# Patient Record
Sex: FEMALE | Race: OTHER | HISPANIC OR LATINO | URBAN - METROPOLITAN AREA
[De-identification: names, ages, dates, MRNs, and addresses within clinical notes are randomized per-mention and may not be internally consistent; named-entity substitution may affect disease eponyms.]

---

## 2019-05-24 ENCOUNTER — INPATIENT (INPATIENT)
Facility: HOSPITAL | Age: 69
LOS: 0 days | Discharge: HOME | DRG: 552 | End: 2019-05-25
Attending: STUDENT IN AN ORGANIZED HEALTH CARE EDUCATION/TRAINING PROGRAM | Admitting: STUDENT IN AN ORGANIZED HEALTH CARE EDUCATION/TRAINING PROGRAM
Payer: MEDICARE

## 2019-05-24 VITALS
WEIGHT: 140.21 LBS | TEMPERATURE: 98 F | OXYGEN SATURATION: 98 % | RESPIRATION RATE: 18 BRPM | SYSTOLIC BLOOD PRESSURE: 170 MMHG | HEART RATE: 79 BPM | DIASTOLIC BLOOD PRESSURE: 81 MMHG

## 2019-05-24 DIAGNOSIS — E78.00 PURE HYPERCHOLESTEROLEMIA, UNSPECIFIED: ICD-10-CM

## 2019-05-24 DIAGNOSIS — R63.8 OTHER SYMPTOMS AND SIGNS CONCERNING FOOD AND FLUID INTAKE: ICD-10-CM

## 2019-05-24 DIAGNOSIS — E11.9 TYPE 2 DIABETES MELLITUS WITHOUT COMPLICATIONS: ICD-10-CM

## 2019-05-24 DIAGNOSIS — Z91.89 OTHER SPECIFIED PERSONAL RISK FACTORS, NOT ELSEWHERE CLASSIFIED: ICD-10-CM

## 2019-05-24 DIAGNOSIS — I10 ESSENTIAL (PRIMARY) HYPERTENSION: ICD-10-CM

## 2019-05-24 DIAGNOSIS — J45.909 UNSPECIFIED ASTHMA, UNCOMPLICATED: ICD-10-CM

## 2019-05-24 DIAGNOSIS — Z29.9 ENCOUNTER FOR PROPHYLACTIC MEASURES, UNSPECIFIED: ICD-10-CM

## 2019-05-24 DIAGNOSIS — M54.9 DORSALGIA, UNSPECIFIED: ICD-10-CM

## 2019-05-24 DIAGNOSIS — Z98.890 OTHER SPECIFIED POSTPROCEDURAL STATES: Chronic | ICD-10-CM

## 2019-05-24 LAB
ALBUMIN SERPL ELPH-MCNC: 4.1 G/DL — SIGNIFICANT CHANGE UP (ref 3.3–5)
ALP SERPL-CCNC: 82 U/L — SIGNIFICANT CHANGE UP (ref 40–120)
ALT FLD-CCNC: 14 U/L — SIGNIFICANT CHANGE UP (ref 10–45)
ANION GAP SERPL CALC-SCNC: 8 MMOL/L — SIGNIFICANT CHANGE UP (ref 5–17)
APPEARANCE UR: CLEAR — SIGNIFICANT CHANGE UP
AST SERPL-CCNC: 16 U/L — SIGNIFICANT CHANGE UP (ref 10–40)
BASOPHILS # BLD AUTO: 0.06 K/UL — SIGNIFICANT CHANGE UP (ref 0–0.2)
BASOPHILS NFR BLD AUTO: 0.8 % — SIGNIFICANT CHANGE UP (ref 0–2)
BILIRUB SERPL-MCNC: 0.3 MG/DL — SIGNIFICANT CHANGE UP (ref 0.2–1.2)
BILIRUB UR-MCNC: NEGATIVE — SIGNIFICANT CHANGE UP
BUN SERPL-MCNC: 18 MG/DL — SIGNIFICANT CHANGE UP (ref 7–23)
CALCIUM SERPL-MCNC: 9.6 MG/DL — SIGNIFICANT CHANGE UP (ref 8.4–10.5)
CHLORIDE SERPL-SCNC: 105 MMOL/L — SIGNIFICANT CHANGE UP (ref 96–108)
CO2 SERPL-SCNC: 28 MMOL/L — SIGNIFICANT CHANGE UP (ref 22–31)
COLOR SPEC: YELLOW — SIGNIFICANT CHANGE UP
CREAT SERPL-MCNC: 0.73 MG/DL — SIGNIFICANT CHANGE UP (ref 0.5–1.3)
DIFF PNL FLD: NEGATIVE — SIGNIFICANT CHANGE UP
EOSINOPHIL # BLD AUTO: 0.31 K/UL — SIGNIFICANT CHANGE UP (ref 0–0.5)
EOSINOPHIL NFR BLD AUTO: 4.1 % — SIGNIFICANT CHANGE UP (ref 0–6)
GLUCOSE SERPL-MCNC: 148 MG/DL — HIGH (ref 70–99)
GLUCOSE UR QL: NEGATIVE — SIGNIFICANT CHANGE UP
HCT VFR BLD CALC: 36.9 % — SIGNIFICANT CHANGE UP (ref 34.5–45)
HGB BLD-MCNC: 12.5 G/DL — SIGNIFICANT CHANGE UP (ref 11.5–15.5)
IMM GRANULOCYTES NFR BLD AUTO: 0.3 % — SIGNIFICANT CHANGE UP (ref 0–1.5)
KETONES UR-MCNC: NEGATIVE — SIGNIFICANT CHANGE UP
LEUKOCYTE ESTERASE UR-ACNC: NEGATIVE — SIGNIFICANT CHANGE UP
LYMPHOCYTES # BLD AUTO: 2.97 K/UL — SIGNIFICANT CHANGE UP (ref 1–3.3)
LYMPHOCYTES # BLD AUTO: 39.5 % — SIGNIFICANT CHANGE UP (ref 13–44)
MCHC RBC-ENTMCNC: 31.2 PG — SIGNIFICANT CHANGE UP (ref 27–34)
MCHC RBC-ENTMCNC: 33.9 GM/DL — SIGNIFICANT CHANGE UP (ref 32–36)
MCV RBC AUTO: 92 FL — SIGNIFICANT CHANGE UP (ref 80–100)
MONOCYTES # BLD AUTO: 0.44 K/UL — SIGNIFICANT CHANGE UP (ref 0–0.9)
MONOCYTES NFR BLD AUTO: 5.9 % — SIGNIFICANT CHANGE UP (ref 2–14)
NEUTROPHILS # BLD AUTO: 3.71 K/UL — SIGNIFICANT CHANGE UP (ref 1.8–7.4)
NEUTROPHILS NFR BLD AUTO: 49.4 % — SIGNIFICANT CHANGE UP (ref 43–77)
NITRITE UR-MCNC: NEGATIVE — SIGNIFICANT CHANGE UP
NRBC # BLD: 0 /100 WBCS — SIGNIFICANT CHANGE UP (ref 0–0)
PH UR: 6 — SIGNIFICANT CHANGE UP (ref 5–8)
PLATELET # BLD AUTO: 191 K/UL — SIGNIFICANT CHANGE UP (ref 150–400)
POTASSIUM SERPL-MCNC: 4.5 MMOL/L — SIGNIFICANT CHANGE UP (ref 3.5–5.3)
POTASSIUM SERPL-SCNC: 4.5 MMOL/L — SIGNIFICANT CHANGE UP (ref 3.5–5.3)
PROT SERPL-MCNC: 6.7 G/DL — SIGNIFICANT CHANGE UP (ref 6–8.3)
PROT UR-MCNC: NEGATIVE MG/DL — SIGNIFICANT CHANGE UP
RBC # BLD: 4.01 M/UL — SIGNIFICANT CHANGE UP (ref 3.8–5.2)
RBC # FLD: 12.2 % — SIGNIFICANT CHANGE UP (ref 10.3–14.5)
SODIUM SERPL-SCNC: 141 MMOL/L — SIGNIFICANT CHANGE UP (ref 135–145)
SP GR SPEC: 1.01 — SIGNIFICANT CHANGE UP (ref 1–1.03)
UROBILINOGEN FLD QL: 0.2 E.U./DL — SIGNIFICANT CHANGE UP
WBC # BLD: 7.51 K/UL — SIGNIFICANT CHANGE UP (ref 3.8–10.5)
WBC # FLD AUTO: 7.51 K/UL — SIGNIFICANT CHANGE UP (ref 3.8–10.5)

## 2019-05-24 PROCEDURE — 93010 ELECTROCARDIOGRAM REPORT: CPT

## 2019-05-24 PROCEDURE — 73502 X-RAY EXAM HIP UNI 2-3 VIEWS: CPT | Mod: 26,RT

## 2019-05-24 PROCEDURE — 72100 X-RAY EXAM L-S SPINE 2/3 VWS: CPT | Mod: 26

## 2019-05-24 PROCEDURE — 99285 EMERGENCY DEPT VISIT HI MDM: CPT | Mod: 25

## 2019-05-24 PROCEDURE — 99223 1ST HOSP IP/OBS HIGH 75: CPT | Mod: GC

## 2019-05-24 RX ORDER — SODIUM CHLORIDE 9 MG/ML
1000 INJECTION, SOLUTION INTRAVENOUS
Refills: 0 | Status: DISCONTINUED | OUTPATIENT
Start: 2019-05-24 | End: 2019-05-25

## 2019-05-24 RX ORDER — MORPHINE SULFATE 50 MG/1
2 CAPSULE, EXTENDED RELEASE ORAL ONCE
Refills: 0 | Status: DISCONTINUED | OUTPATIENT
Start: 2019-05-24 | End: 2019-05-24

## 2019-05-24 RX ORDER — IBUPROFEN 200 MG
400 TABLET ORAL ONCE
Refills: 0 | Status: COMPLETED | OUTPATIENT
Start: 2019-05-24 | End: 2019-05-24

## 2019-05-24 RX ORDER — INSULIN LISPRO 100/ML
VIAL (ML) SUBCUTANEOUS
Refills: 0 | Status: DISCONTINUED | OUTPATIENT
Start: 2019-05-24 | End: 2019-05-25

## 2019-05-24 RX ORDER — ACETAMINOPHEN 500 MG
650 TABLET ORAL EVERY 6 HOURS
Refills: 0 | Status: DISCONTINUED | OUTPATIENT
Start: 2019-05-24 | End: 2019-05-25

## 2019-05-24 RX ORDER — UMECLIDINIUM 62.5 UG/1
0 AEROSOL, POWDER ORAL
Qty: 0 | Refills: 0 | DISCHARGE

## 2019-05-24 RX ORDER — INSULIN GLARGINE 100 [IU]/ML
12 INJECTION, SOLUTION SUBCUTANEOUS AT BEDTIME
Refills: 0 | Status: DISCONTINUED | OUTPATIENT
Start: 2019-05-24 | End: 2019-05-25

## 2019-05-24 RX ORDER — GLUCAGON INJECTION, SOLUTION 0.5 MG/.1ML
1 INJECTION, SOLUTION SUBCUTANEOUS ONCE
Refills: 0 | Status: DISCONTINUED | OUTPATIENT
Start: 2019-05-24 | End: 2019-05-25

## 2019-05-24 RX ORDER — IBUPROFEN 200 MG
400 TABLET ORAL EVERY 6 HOURS
Refills: 0 | Status: DISCONTINUED | OUTPATIENT
Start: 2019-05-24 | End: 2019-05-25

## 2019-05-24 RX ORDER — DIAZEPAM 5 MG
5 TABLET ORAL ONCE
Refills: 0 | Status: DISCONTINUED | OUTPATIENT
Start: 2019-05-24 | End: 2019-05-24

## 2019-05-24 RX ORDER — SODIUM CHLORIDE 9 MG/ML
3 INJECTION INTRAMUSCULAR; INTRAVENOUS; SUBCUTANEOUS ONCE
Refills: 0 | Status: COMPLETED | OUTPATIENT
Start: 2019-05-24 | End: 2019-05-24

## 2019-05-24 RX ORDER — ACETAMINOPHEN 500 MG
650 TABLET ORAL EVERY 6 HOURS
Refills: 0 | Status: DISCONTINUED | OUTPATIENT
Start: 2019-05-24 | End: 2019-05-24

## 2019-05-24 RX ORDER — RANITIDINE HYDROCHLORIDE 150 MG/1
300 TABLET, FILM COATED ORAL AT BEDTIME
Refills: 0 | Status: DISCONTINUED | OUTPATIENT
Start: 2019-05-24 | End: 2019-05-25

## 2019-05-24 RX ORDER — DEXTROSE 50 % IN WATER 50 %
25 SYRINGE (ML) INTRAVENOUS ONCE
Refills: 0 | Status: DISCONTINUED | OUTPATIENT
Start: 2019-05-24 | End: 2019-05-25

## 2019-05-24 RX ORDER — LISINOPRIL 2.5 MG/1
20 TABLET ORAL DAILY
Refills: 0 | Status: DISCONTINUED | OUTPATIENT
Start: 2019-05-24 | End: 2019-05-25

## 2019-05-24 RX ORDER — DEXTROSE 50 % IN WATER 50 %
15 SYRINGE (ML) INTRAVENOUS ONCE
Refills: 0 | Status: DISCONTINUED | OUTPATIENT
Start: 2019-05-24 | End: 2019-05-25

## 2019-05-24 RX ORDER — HEPARIN SODIUM 5000 [USP'U]/ML
5000 INJECTION INTRAVENOUS; SUBCUTANEOUS EVERY 8 HOURS
Refills: 0 | Status: DISCONTINUED | OUTPATIENT
Start: 2019-05-24 | End: 2019-05-25

## 2019-05-24 RX ORDER — LISINOPRIL 2.5 MG/1
0 TABLET ORAL
Qty: 0 | Refills: 0 | DISCHARGE

## 2019-05-24 RX ORDER — ATORVASTATIN CALCIUM 80 MG/1
30 TABLET, FILM COATED ORAL AT BEDTIME
Refills: 0 | Status: DISCONTINUED | OUTPATIENT
Start: 2019-05-24 | End: 2019-05-25

## 2019-05-24 RX ORDER — DEXTROSE 50 % IN WATER 50 %
12.5 SYRINGE (ML) INTRAVENOUS ONCE
Refills: 0 | Status: DISCONTINUED | OUTPATIENT
Start: 2019-05-24 | End: 2019-05-25

## 2019-05-24 RX ORDER — BUDESONIDE AND FORMOTEROL FUMARATE DIHYDRATE 160; 4.5 UG/1; UG/1
2 AEROSOL RESPIRATORY (INHALATION)
Refills: 0 | Status: DISCONTINUED | OUTPATIENT
Start: 2019-05-24 | End: 2019-05-25

## 2019-05-24 RX ORDER — RANITIDINE HYDROCHLORIDE 150 MG/1
0 TABLET, FILM COATED ORAL
Qty: 0 | Refills: 0 | DISCHARGE

## 2019-05-24 RX ORDER — IPRATROPIUM/ALBUTEROL SULFATE 18-103MCG
3 AEROSOL WITH ADAPTER (GRAM) INHALATION EVERY 6 HOURS
Refills: 0 | Status: DISCONTINUED | OUTPATIENT
Start: 2019-05-24 | End: 2019-05-25

## 2019-05-24 RX ORDER — ACETAMINOPHEN 500 MG
650 TABLET ORAL ONCE
Refills: 0 | Status: COMPLETED | OUTPATIENT
Start: 2019-05-24 | End: 2019-05-24

## 2019-05-24 RX ORDER — ATORVASTATIN CALCIUM 80 MG/1
0 TABLET, FILM COATED ORAL
Qty: 0 | Refills: 0 | DISCHARGE

## 2019-05-24 RX ORDER — INSULIN GLARGINE 100 [IU]/ML
0 INJECTION, SOLUTION SUBCUTANEOUS
Qty: 0 | Refills: 0 | DISCHARGE

## 2019-05-24 RX ADMIN — Medication 650 MILLIGRAM(S): at 19:13

## 2019-05-24 RX ADMIN — SODIUM CHLORIDE 3 MILLILITER(S): 9 INJECTION INTRAMUSCULAR; INTRAVENOUS; SUBCUTANEOUS at 20:17

## 2019-05-24 RX ADMIN — Medication 5 MILLIGRAM(S): at 18:01

## 2019-05-24 RX ADMIN — Medication 400 MILLIGRAM(S): at 18:01

## 2019-05-24 RX ADMIN — MORPHINE SULFATE 2 MILLIGRAM(S): 50 CAPSULE, EXTENDED RELEASE ORAL at 20:17

## 2019-05-24 RX ADMIN — Medication 400 MILLIGRAM(S): at 19:19

## 2019-05-24 NOTE — ED PROVIDER NOTE - NS ED ATTENDING STATEMENT MOD
Left message for pt to call back. Pt asked to confirm appt and status of medical records. Attending Only

## 2019-05-24 NOTE — ED ADULT TRIAGE NOTE - CHIEF COMPLAINT QUOTE
" I have right sided hip pain for a week" " I have right sided hip pain for a week going to my ankle."

## 2019-05-24 NOTE — H&P ADULT - PROBLEM SELECTOR PLAN 8
1) PCP Contacted on Admission: (Y/N) --> Name & Phone #: Does not remember  2) Date of Contact with PCP:  3) PCP Contacted at Discharge: (Y/N)  4) Summary of Handoff Given to PCP:   5) Post-Discharge Appointment Date and Location:

## 2019-05-24 NOTE — ED ADULT NURSE NOTE - NSIMPLEMENTINTERV_GEN_ALL_ED
Implemented All Fall Risk Interventions:  [place] to call system. Call bell, personal items and telephone within reach. Instruct patient to call for assistance. Room bathroom lighting operational. Non-slip footwear when patient is off stretcher. Physically safe environment: no spills, clutter or unnecessary equipment. Stretcher in lowest position, wheels locked, appropriate side rails in place. Provide visual cue, wrist band, yellow gown, etc. Monitor gait and stability. Monitor for mental status changes and reorient to person, place, and time. Review medications for side effects contributing to fall risk. Reinforce activity limits and safety measures with patient and family.

## 2019-05-24 NOTE — H&P ADULT - NSHPSOCIALHISTORY_GEN_ALL_CORE
Lives at home alone. No HHA, no assistive devices  Current smoker (>40 yrs, 6 cigs/day), no alcohol or other drug use

## 2019-05-24 NOTE — H&P ADULT - PROBLEM SELECTOR PLAN 2
- MDSS Takes Basaglar (long acting insulin) 25U at bedtime  - MDSS  - A1c  - Lantus 12U as pt hospitalized, can reassess with sliding requirements tomorrow

## 2019-05-24 NOTE — H&P ADULT - ASSESSMENT
68F PMH asthma, DM, HLD, HTN, laminectomy (1990, 1991) in past presenting to [facility] ED with 1 week of R lower back pain radiating down RLE, likely 2/2 sciatica vs. lumbar disc herniation.

## 2019-05-24 NOTE — ED ADULT NURSE NOTE - CHPI ED NUR SYMPTOMS NEG
no fatigue/no bowel dysfunction/no constipation/no motor function loss/no tingling/no bladder dysfunction/no neck tenderness/no numbness

## 2019-05-24 NOTE — H&P ADULT - PROBLEM SELECTOR PLAN 1
- Xray showing osteoarthritis and disc disease  - Tylenol PRN for moderate pain  - PT consult in AM R lower back pain w/ radiation down R leg likely 2/2 sciatica vs. disc herniation.  - Rectal exam with rectal tone intact; no symptoms of urinary incontinence or retention. No symptoms of fecal incontinence.  - Xray showing osteoarthritis and disc disease.   - Tylenol PRN for mild pain  - Motrin PRN for mod pain  - morphine PRN for severe pain  - PT consult in AM R lower back pain w/ radiation down R leg likely 2/2 sciatica vs. disc herniation.  - Rectal exam with rectal tone intact; no symptoms of urinary incontinence or retention. No symptoms of fecal incontinence.  - Xray showing osteoarthritis and disc disease  - Tylenol PRN for mod pain  - Motrin PRN for severe pain  - if uncontrolled, plan to add morphine for breakthrough  - PT consult in AM

## 2019-05-24 NOTE — H&P ADULT - PROBLEM SELECTOR PLAN 7
1) PCP Contacted on Admission: (Y/N) --> Name & Phone #:  2) Date of Contact with PCP:  3) PCP Contacted at Discharge: (Y/N)  4) Summary of Handoff Given to PCP:   5) Post-Discharge Appointment Date and Location: [facility]    #smoking cessation  - Counseled on smoking cessation  - Pt does not want nicotine replacement therapy at this time    #dyspepsia  - c/w home ranitidine 200mg daily

## 2019-05-24 NOTE — H&P ADULT - HISTORY OF PRESENT ILLNESS
68F PMH asthma, DM, HLD, laminectomy in past presenting to [facility] ED with 1 week of R hip pain radiating down RLE. Denies trauma, fall, urinary incontinence or retention, fecal incontinence, foot drop, f/c, cp, sob, abdominal pain, n/v/d/c, dysuria, edema, rash.    ED VSS  ED Adm: Motrin 400mg, Tylenol 650, diazepam 5mg, morphine IVP 2mg x1  Xray lumbar spine: No acute fracture; multilevel degenerative OA and disc disease 68F PMH asthma, current smoker (6 cigs/day), DM, HLD, HTN, laminectomy (1990, 1991) in past presenting to [facility] ED with 1 week of R lower back pain radiating down RLE. States pain is 10/10 of lower back (near buttock) and radiates down R leg, cramping in nature. Worse with ambulation. Prior to this, had no issues with ambulating but now has to lean on wall to walk. Denies trauma, fall, urinary incontinence or retention, fecal incontinence, foot drop, f/c, cp, sob, abdominal pain, n/v/d/c, dysuria, edema, rash.    ED VSS  ED Adm: Motrin 400mg, Tylenol 650, diazepam 5mg, morphine IVP 2mg x1  Xray lumbar spine: No acute fracture; multilevel degenerative OA and disc disease

## 2019-05-24 NOTE — ED PROVIDER NOTE - OBJECTIVE STATEMENT
69 yo F with hx of mult prior lumbar surgeries with 1 week of right hip pain radiating down RLE-  no incontinence of urine or stool--or fever or chills - no flank pain or focal LBP-  no trauma or falls- no fevers or chills- 67 yo F with hx of mult prior lumbar surgeries with 1 week of right hip pain radiating down RLE-  no incontinence of urine or stool--or fever or chills - no flank pain or focal LBP-  no trauma or falls- no fevers or chills- no foot drop or focal weakness 67 yo F with hx of asthma DM elev cholesterol with mult prior lumbar surgeries with 1 week of right hip pain radiating down RLE-  no incontinence of urine or stool--or fever or chills - no flank pain or focal LBP-  no trauma or falls- no fevers or chills- no foot drop or focal weakness

## 2019-05-24 NOTE — ED PROVIDER NOTE - CLINICAL SUMMARY MEDICAL DECISION MAKING FREE TEXT BOX
67 yo F with asthma DM HTN with  prior lami with atraumatic low back pain x 1 week -no hx of trauma -patellar femoral reflexes decreased  pos right sciatic notch tenderness - no foot drop--fall risk - diff ambulating - rec admit PT pain management  mri ordered   no incont of urine or stool   no fevers

## 2019-05-24 NOTE — H&P ADULT - PROBLEM SELECTOR PLAN 5
F: PO intake  E: replete PRN  N: consistent carb diet Takes Brio Ellipta and Symbicort at home. Not in exacerbation. Well-controlled.  - PRN Duonebs  - c/w home Symbicort

## 2019-05-24 NOTE — H&P ADULT - NSHPPHYSICALEXAM_GEN_ALL_CORE
Constitutional: elderly female, pleasant, appears WDWN, in NAD  Head: NC/AT  Eyes: EOMI, anicteric sclera  Neck: supple  Respiratory: CTA B/L; no W/R/R, no retractions  Cardiac: +S1/S2; RRR; no M/R/G  Gastrointestinal: abdomen soft, NT/ND; no rebound or guarding; +BSx4  Back: spine midline, no bony tenderness or step-offs; R gluteal region TTP; reproducible pain with straight leg raise 30 degrees  Extremities: WWP, no clubbing or cyanosis; no peripheral edema  Musculoskeletal: NROM x4; no joint swelling, tenderness or erythema  Vascular: 2+ radial, DP/PT pulses B/L  Dermatologic: skin warm, dry and intact; no rashes, wounds, or scars  Lymphatic: no submandibular or cervical LAD  Neurologic: AAOx3; CNII-XII grossly intact; no focal deficits  Psychiatric: affect and characteristics of appearance, verbalizations, behaviors are appropriate  Rectal tone intact

## 2019-05-24 NOTE — H&P ADULT - NSHPLABSRESULTS_GEN_ALL_CORE
.  LABS:                         12.5   7.51  )-----------( 191      ( 24 May 2019 20:07 )             36.9             Urinalysis Basic - ( 24 May 2019 20:17 )    Color: Yellow / Appearance: Clear / S.015 / pH: x  Gluc: x / Ketone: NEGATIVE  / Bili: Negative / Urobili: 0.2 E.U./dL   Blood: x / Protein: NEGATIVE mg/dL / Nitrite: NEGATIVE   Leuk Esterase: NEGATIVE / RBC: x / WBC x   Sq Epi: x / Non Sq Epi: x / Bacteria: x                RADIOLOGY, EKG & ADDITIONAL TESTS: Reviewed.

## 2019-05-24 NOTE — ED ADULT NURSE NOTE - OBJECTIVE STATEMENT
Pt came to ED complaining of right hip pain radiating to right leg and ankle x7 days. Pt ambulates with limp due to pain. Pt denies fall or trauma injury.  Pt denies all other medical complaints at this time. Positive PMS x4 extremities.

## 2019-05-24 NOTE — H&P ADULT - ATTENDING COMMENTS
68 year old female patient with history of laminectomy came in for worsening lower back pain with radiation to RLE.  1. Severe Back Pain  no signs of cord compression  likely secondary to degenerative disc disease  currently requires morphine IV  PRN order for severe pain - will try to deescalate to NSAIDs  2. Degenerative Disk Disease  outpatient follow up with spine surgery and PMR  physical therapy consult for dispo assessment

## 2019-05-25 ENCOUNTER — TRANSCRIPTION ENCOUNTER (OUTPATIENT)
Age: 69
End: 2019-05-25

## 2019-05-25 VITALS
HEART RATE: 62 BPM | TEMPERATURE: 97 F | DIASTOLIC BLOOD PRESSURE: 77 MMHG | OXYGEN SATURATION: 97 % | RESPIRATION RATE: 16 BRPM | SYSTOLIC BLOOD PRESSURE: 131 MMHG

## 2019-05-25 LAB
ANION GAP SERPL CALC-SCNC: 10 MMOL/L — SIGNIFICANT CHANGE UP (ref 5–17)
BUN SERPL-MCNC: 15 MG/DL — SIGNIFICANT CHANGE UP (ref 7–23)
CALCIUM SERPL-MCNC: 9.4 MG/DL — SIGNIFICANT CHANGE UP (ref 8.4–10.5)
CHLORIDE SERPL-SCNC: 107 MMOL/L — SIGNIFICANT CHANGE UP (ref 96–108)
CO2 SERPL-SCNC: 26 MMOL/L — SIGNIFICANT CHANGE UP (ref 22–31)
CREAT SERPL-MCNC: 0.66 MG/DL — SIGNIFICANT CHANGE UP (ref 0.5–1.3)
GLUCOSE SERPL-MCNC: 106 MG/DL — HIGH (ref 70–99)
HCV AB S/CO SERPL IA: 0.3 S/CO — SIGNIFICANT CHANGE UP
HCV AB SERPL-IMP: SIGNIFICANT CHANGE UP
POTASSIUM SERPL-MCNC: 3.7 MMOL/L — SIGNIFICANT CHANGE UP (ref 3.5–5.3)
POTASSIUM SERPL-SCNC: 3.7 MMOL/L — SIGNIFICANT CHANGE UP (ref 3.5–5.3)
SODIUM SERPL-SCNC: 143 MMOL/L — SIGNIFICANT CHANGE UP (ref 135–145)

## 2019-05-25 PROCEDURE — 73502 X-RAY EXAM HIP UNI 2-3 VIEWS: CPT

## 2019-05-25 PROCEDURE — G0378: CPT

## 2019-05-25 PROCEDURE — 80048 BASIC METABOLIC PNL TOTAL CA: CPT

## 2019-05-25 PROCEDURE — 99239 HOSP IP/OBS DSCHRG MGMT >30: CPT | Mod: GC

## 2019-05-25 PROCEDURE — 83036 HEMOGLOBIN GLYCOSYLATED A1C: CPT

## 2019-05-25 PROCEDURE — 99285 EMERGENCY DEPT VISIT HI MDM: CPT | Mod: 25

## 2019-05-25 PROCEDURE — 36415 COLL VENOUS BLD VENIPUNCTURE: CPT

## 2019-05-25 PROCEDURE — 72100 X-RAY EXAM L-S SPINE 2/3 VWS: CPT

## 2019-05-25 PROCEDURE — 93005 ELECTROCARDIOGRAM TRACING: CPT

## 2019-05-25 PROCEDURE — 94640 AIRWAY INHALATION TREATMENT: CPT

## 2019-05-25 PROCEDURE — 85025 COMPLETE CBC W/AUTO DIFF WBC: CPT

## 2019-05-25 PROCEDURE — 87086 URINE CULTURE/COLONY COUNT: CPT

## 2019-05-25 PROCEDURE — 86803 HEPATITIS C AB TEST: CPT

## 2019-05-25 PROCEDURE — 81003 URINALYSIS AUTO W/O SCOPE: CPT

## 2019-05-25 PROCEDURE — 97161 PT EVAL LOW COMPLEX 20 MIN: CPT

## 2019-05-25 PROCEDURE — 96374 THER/PROPH/DIAG INJ IV PUSH: CPT

## 2019-05-25 PROCEDURE — 80053 COMPREHEN METABOLIC PANEL: CPT

## 2019-05-25 PROCEDURE — 82962 GLUCOSE BLOOD TEST: CPT

## 2019-05-25 RX ORDER — CYCLOBENZAPRINE HYDROCHLORIDE 10 MG/1
1 TABLET, FILM COATED ORAL
Qty: 90 | Refills: 0
Start: 2019-05-25 | End: 2019-06-23

## 2019-05-25 RX ORDER — LIDOCAINE 4 G/100G
1 CREAM TOPICAL
Qty: 1 | Refills: 0
Start: 2019-05-25 | End: 2019-06-07

## 2019-05-25 RX ORDER — CYCLOBENZAPRINE HYDROCHLORIDE 10 MG/1
5 TABLET, FILM COATED ORAL THREE TIMES A DAY
Refills: 0 | Status: DISCONTINUED | OUTPATIENT
Start: 2019-05-25 | End: 2019-05-25

## 2019-05-25 RX ORDER — POTASSIUM CHLORIDE 20 MEQ
40 PACKET (EA) ORAL ONCE
Refills: 0 | Status: COMPLETED | OUTPATIENT
Start: 2019-05-25 | End: 2019-05-25

## 2019-05-25 RX ORDER — MORPHINE SULFATE 50 MG/1
1 CAPSULE, EXTENDED RELEASE ORAL ONCE
Refills: 0 | Status: DISCONTINUED | OUTPATIENT
Start: 2019-05-25 | End: 2019-05-25

## 2019-05-25 RX ADMIN — INSULIN GLARGINE 12 UNIT(S): 100 INJECTION, SOLUTION SUBCUTANEOUS at 00:11

## 2019-05-25 RX ADMIN — LISINOPRIL 20 MILLIGRAM(S): 2.5 TABLET ORAL at 06:28

## 2019-05-25 RX ADMIN — HEPARIN SODIUM 5000 UNIT(S): 5000 INJECTION INTRAVENOUS; SUBCUTANEOUS at 06:28

## 2019-05-25 RX ADMIN — Medication 650 MILLIGRAM(S): at 06:49

## 2019-05-25 RX ADMIN — Medication 40 MILLIEQUIVALENT(S): at 12:58

## 2019-05-25 RX ADMIN — MORPHINE SULFATE 1 MILLIGRAM(S): 50 CAPSULE, EXTENDED RELEASE ORAL at 12:57

## 2019-05-25 RX ADMIN — MORPHINE SULFATE 1 MILLIGRAM(S): 50 CAPSULE, EXTENDED RELEASE ORAL at 13:14

## 2019-05-25 RX ADMIN — HEPARIN SODIUM 5000 UNIT(S): 5000 INJECTION INTRAVENOUS; SUBCUTANEOUS at 00:10

## 2019-05-25 RX ADMIN — Medication 400 MILLIGRAM(S): at 05:28

## 2019-05-25 RX ADMIN — BUDESONIDE AND FORMOTEROL FUMARATE DIHYDRATE 2 PUFF(S): 160; 4.5 AEROSOL RESPIRATORY (INHALATION) at 12:58

## 2019-05-25 RX ADMIN — ATORVASTATIN CALCIUM 30 MILLIGRAM(S): 80 TABLET, FILM COATED ORAL at 00:10

## 2019-05-25 RX ADMIN — HEPARIN SODIUM 5000 UNIT(S): 5000 INJECTION INTRAVENOUS; SUBCUTANEOUS at 16:13

## 2019-05-25 RX ADMIN — Medication 2: at 13:25

## 2019-05-25 RX ADMIN — Medication 650 MILLIGRAM(S): at 07:15

## 2019-05-25 RX ADMIN — RANITIDINE HYDROCHLORIDE 300 MILLIGRAM(S): 150 TABLET, FILM COATED ORAL at 00:10

## 2019-05-25 RX ADMIN — Medication 40 MILLIGRAM(S): at 13:02

## 2019-05-25 RX ADMIN — Medication 400 MILLIGRAM(S): at 06:15

## 2019-05-25 NOTE — PHYSICAL THERAPY INITIAL EVALUATION ADULT - GAIT DEVIATIONS NOTED, PT EVAL
decreased francisco javier/decreased weight-shifting ability/antalgic gait with decreased single limb stance tolerance RLE

## 2019-05-25 NOTE — DISCHARGE NOTE NURSING/CASE MANAGEMENT/SOCIAL WORK - NSDCFUADDAPPT_GEN_ALL_CORE_FT
Please follow up with your primary care doctor in 1-2 weeks for pain control optimization and further management of care.    [facility]   Address: [address], [place] [address]  Phone: [phone]

## 2019-05-25 NOTE — DISCHARGE NOTE PROVIDER - HOSPITAL COURSE
A 68F PMH asthma, current smoker (6 cigs/day), DM, HLD, HTN, laminectomy (1990, 1991) in past presenting to [facility] ED with 1 week of R lower back pain radiating down RLE. States pain is 10/10 of lower back (near buttock) and radiates down R leg, cramping in nature. Worse with ambulation. Prior to this, had no issues with ambulating but now has to lean on wall to walk. Denies trauma, fall, urinary incontinence or retention, fecal incontinence, foot drop, f/c, cp, sob, abdominal pain, n/v/d/c, dysuria, edema, rash. VSS. Received Motrin 400mg, Tylenol 650, diazepam 5mg, morphine IVP 2mg x1. Xray lumbar spine: No acute fracture; multilevel degenerative OA and disc disease. Likely sciatica. PT evaluated patient. Recommended         Patient hemodynamically stabel and ready for discharge to A 68F PMH asthma, current smoker (6 cigs/day), DM, HLD, HTN, laminectomy (1990, 1991) in past presenting to [facility] ED with 1 week of R lower back pain radiating down RLE. States pain is 10/10 of lower back (near buttock) and radiates down R leg, cramping in nature. Worse with ambulation. Prior to this, had no issues with ambulating but now has to lean on wall to walk. Denies trauma, fall, urinary incontinence or retention, fecal incontinence, foot drop, f/c, cp, sob, abdominal pain, n/v/d/c, dysuria, edema, rash. VSS. Received Motrin 400mg, Tylenol 650, diazepam 5mg, morphine IVP 2mg x1. Xray lumbar spine: No acute fracture; multilevel degenerative OA and disc disease. Likely sciatica. PT evaluated patient. Recommended outpatient PT. D/c on flexeril, Prednisone taper, Lidocaine topical jelly.         Patient hemodynamically stable and ready for discharge to home with follow up instructions. A 68F PMH asthma, current smoker (6 cigs/day), DM, HLD, HTN, laminectomy (1990, 1991) in past presenting to [facility] ED with 1 week of R lower back pain radiating down RLE. States pain is 10/10 of lower back (near buttock) and radiates down R leg, cramping in nature. Worse with ambulation. Prior to this, had no issues with ambulating but now has to lean on wall to walk. Denies trauma, fall, urinary incontinence or retention, fecal incontinence, foot drop, f/c, cp, sob, abdominal pain, n/v/d/c, dysuria, edema, rash. VSS. Received Motrin 400mg, Tylenol 650, diazepam 5mg, morphine IVP 2mg x1. Xray lumbar spine: No acute fracture; multilevel degenerative OA and disc disease. Likely sciatica. PT evaluated patient. Recommended outpatient PT. D/c on flexeril, Prednisone taper, Lidocaine topical jelly.         Patient hemodynamically stable and ready for discharge to home with follow up instructions.             ATTENDING ADDENDUM    discharge diagnosis:        acute lumbosacral radiculopathy    DM II    HTN    asthma    hx lumbar laminectomy        Patient seen and examined. Case discussed with housestaff.     I was physically present for the key portions of the evaluation and management (E/M) service provided.  I agree with the above discharge note, physical, and plan which I have reviewed and edited where appropriate.        s/    daughter and grandchildren at bedside    pt c/o no pain while lying in bed    pain starts at right buttock and radiates to lat-anterior thigh to lateral lower leg    she has NO leg pain or paresthesias on left leg    (+) paresthesias    (-) numbness    (-) weakness per se, but has (+) pain that limits her mobility    no bladder or bowel incontinence     no urinary retention    no groin numbness    no trauma                vital signs reviewed and stable        gen: well appearing    heent: op clear, anicteric sclera, neck supple    cor: s1 s2 nml, rrr    pulm: cta bl , no w/r/r    abd: (+) bs, soft , nt/nd    ext: no c/c/e    back: no midline vertebral tenderness, no cva tenderness    vasc: 2+ radial and Dorsalis pedis pulses bl    derm: no rashes    buttock: right buttock tender, no erythema/edema.     neuro: aao x 3, cn ii-xii intact, 5/5 str upper ext bilat and LLE. 4/5 str (limited due to pain per pt) LLE . patellar reflexes 2+ bilat., ankle reflex 2+ bilat, no ankle clonus, downgoing plantar reflex bilat,  sensation intact, no dysmetria        Time spent : 50min

## 2019-05-25 NOTE — DISCHARGE NOTE PROVIDER - NSDCFUADDAPPT_GEN_ALL_CORE_FT
Please follow up with your primary care doctor in 1-2 weeks for pain control optimization and further management of care. Please follow up with your primary care doctor in 1-2 weeks for pain control optimization and further management of care.    [facility]   Address: [address], [place] [address]  Phone: [phone]

## 2019-05-25 NOTE — PROVIDER CONTACT NOTE (OTHER) - ACTION/TREATMENT ORDERED:
Spoke with MD Milka. Stated okay to administer tylenol. Will administer tylenol and monitor pt closely.

## 2019-05-25 NOTE — DISCHARGE NOTE PROVIDER - NSDCCPCAREPLAN_GEN_ALL_CORE_FT
PRINCIPAL DISCHARGE DIAGNOSIS  Diagnosis: Sciatica, right side  Assessment and Plan of Treatment: You presented with Right lower back pain with radiation down Right leg.   Xray showing osteoarthritis and disc disease. You recieved Tylenol, Motrin and Morphien for pain control. Physical therapy recommended         SECONDARY DISCHARGE DIAGNOSES  Diagnosis: Diabetes mellitus  Assessment and Plan of Treatment: Continue Basaglar (long acting insulin) 25U at bedtime. Curent A1c 6.9.    Diagnosis: Asthma  Assessment and Plan of Treatment: Continue Brio Ellipta and Symbicort at home.    Diagnosis: Hypertension  Assessment and Plan of Treatment: Continue home lisinopril 20mg daily. PRINCIPAL DISCHARGE DIAGNOSIS  Diagnosis: Sciatica, right side  Assessment and Plan of Treatment: You presented with Right lower back pain with radiation down Right leg.   Xray showing osteoarthritis and disc disease. You received Tylenol, Motrin and Morphine for pain control. Physical therapy recommended outpatient physical therapy. Please take Prednisone 40mg (5/26), 30mg (5/27), 20mg (5/28), 10mg (5/29). Please take Flexeril 5mg three times a day as needed. Please apply lidocaine jelly to area as needed. Please follow up with your PCP within 1 week for further management of care.      SECONDARY DISCHARGE DIAGNOSES  Diagnosis: Diabetes mellitus  Assessment and Plan of Treatment: Continue Basaglar (long acting insulin) 25U at bedtime. Curent A1c 6.9.    Diagnosis: Asthma  Assessment and Plan of Treatment: Continue Brio Ellipta and Symbicort at home.    Diagnosis: Hypertension  Assessment and Plan of Treatment: Continue home lisinopril 20mg daily.

## 2019-05-25 NOTE — PHYSICAL THERAPY INITIAL EVALUATION ADULT - PERTINENT HX OF CURRENT PROBLEM, REHAB EVAL
Pt. is a 68 y.o female presenting R LBP/ buttock pain with RLE radiation resulting in difficulty with walking. Imaging of the spine - negative for vert Fx, compression.

## 2019-05-25 NOTE — DISCHARGE NOTE NURSING/CASE MANAGEMENT/SOCIAL WORK - NSDCDPATPORTLINK_GEN_ALL_CORE
You can access the [facility] Patient Portal, offered by [facility], by registering with the following website: [email or web address]

## 2019-05-26 LAB
CULTURE RESULTS: SIGNIFICANT CHANGE UP
SPECIMEN SOURCE: SIGNIFICANT CHANGE UP

## 2019-05-28 PROBLEM — I10 ESSENTIAL (PRIMARY) HYPERTENSION: Chronic | Status: ACTIVE | Noted: 2019-05-24

## 2019-05-28 PROBLEM — Z00.00 ENCOUNTER FOR PREVENTIVE HEALTH EXAMINATION: Status: ACTIVE | Noted: 2019-05-28

## 2019-05-28 PROBLEM — E78.00 PURE HYPERCHOLESTEROLEMIA, UNSPECIFIED: Chronic | Status: ACTIVE | Noted: 2019-05-24

## 2019-05-30 DIAGNOSIS — F17.210 NICOTINE DEPENDENCE, CIGARETTES, UNCOMPLICATED: ICD-10-CM

## 2019-05-30 DIAGNOSIS — M51.17 INTERVERTEBRAL DISC DISORDERS WITH RADICULOPATHY, LUMBOSACRAL REGION: ICD-10-CM

## 2019-05-30 DIAGNOSIS — E78.5 HYPERLIPIDEMIA, UNSPECIFIED: ICD-10-CM

## 2019-05-30 DIAGNOSIS — M54.5 LOW BACK PAIN: ICD-10-CM

## 2019-05-30 DIAGNOSIS — E11.9 TYPE 2 DIABETES MELLITUS WITHOUT COMPLICATIONS: ICD-10-CM

## 2019-05-30 DIAGNOSIS — R10.13 EPIGASTRIC PAIN: ICD-10-CM

## 2019-05-30 DIAGNOSIS — I10 ESSENTIAL (PRIMARY) HYPERTENSION: ICD-10-CM

## 2019-05-30 DIAGNOSIS — J45.909 UNSPECIFIED ASTHMA, UNCOMPLICATED: ICD-10-CM

## 2019-06-26 ENCOUNTER — APPOINTMENT (OUTPATIENT)
Dept: PULMONOLOGY | Facility: CLINIC | Age: 69
End: 2019-06-26

## 2020-01-05 ENCOUNTER — EMERGENCY (EMERGENCY)
Facility: HOSPITAL | Age: 70
LOS: 1 days | Discharge: HOME | End: 2020-01-05
Attending: EMERGENCY MEDICINE | Admitting: EMERGENCY MEDICINE
Payer: MEDICARE

## 2020-01-05 VITALS
TEMPERATURE: 98 F | OXYGEN SATURATION: 99 % | RESPIRATION RATE: 18 BRPM | DIASTOLIC BLOOD PRESSURE: 80 MMHG | HEART RATE: 66 BPM | SYSTOLIC BLOOD PRESSURE: 132 MMHG

## 2020-01-05 VITALS
RESPIRATION RATE: 18 BRPM | DIASTOLIC BLOOD PRESSURE: 83 MMHG | SYSTOLIC BLOOD PRESSURE: 177 MMHG | HEART RATE: 81 BPM | WEIGHT: 140.88 LBS | OXYGEN SATURATION: 97 % | TEMPERATURE: 98 F | HEIGHT: 60 IN

## 2020-01-05 DIAGNOSIS — Z98.890 OTHER SPECIFIED POSTPROCEDURAL STATES: Chronic | ICD-10-CM

## 2020-01-05 DIAGNOSIS — R42 DIZZINESS AND GIDDINESS: ICD-10-CM

## 2020-01-05 LAB
ALBUMIN SERPL ELPH-MCNC: 4.4 G/DL — SIGNIFICANT CHANGE UP (ref 3.3–5)
ALP SERPL-CCNC: 105 U/L — SIGNIFICANT CHANGE UP (ref 40–120)
ALT FLD-CCNC: 18 U/L — SIGNIFICANT CHANGE UP (ref 10–45)
ANION GAP SERPL CALC-SCNC: 10 MMOL/L — SIGNIFICANT CHANGE UP (ref 5–17)
APTT BLD: 34.9 SEC — SIGNIFICANT CHANGE UP (ref 27.5–36.3)
AST SERPL-CCNC: 25 U/L — SIGNIFICANT CHANGE UP (ref 10–40)
BASOPHILS # BLD AUTO: 0.05 K/UL — SIGNIFICANT CHANGE UP (ref 0–0.2)
BASOPHILS NFR BLD AUTO: 0.8 % — SIGNIFICANT CHANGE UP (ref 0–2)
BILIRUB SERPL-MCNC: 0.4 MG/DL — SIGNIFICANT CHANGE UP (ref 0.2–1.2)
BUN SERPL-MCNC: 14 MG/DL — SIGNIFICANT CHANGE UP (ref 7–23)
CALCIUM SERPL-MCNC: 9.9 MG/DL — SIGNIFICANT CHANGE UP (ref 8.4–10.5)
CHLORIDE SERPL-SCNC: 106 MMOL/L — SIGNIFICANT CHANGE UP (ref 96–108)
CO2 SERPL-SCNC: 26 MMOL/L — SIGNIFICANT CHANGE UP (ref 22–31)
CREAT SERPL-MCNC: 0.66 MG/DL — SIGNIFICANT CHANGE UP (ref 0.5–1.3)
EOSINOPHIL # BLD AUTO: 0.22 K/UL — SIGNIFICANT CHANGE UP (ref 0–0.5)
EOSINOPHIL NFR BLD AUTO: 3.5 % — SIGNIFICANT CHANGE UP (ref 0–6)
GLUCOSE SERPL-MCNC: 121 MG/DL — HIGH (ref 70–99)
HCT VFR BLD CALC: 37.8 % — SIGNIFICANT CHANGE UP (ref 34.5–45)
HGB BLD-MCNC: 11.9 G/DL — SIGNIFICANT CHANGE UP (ref 11.5–15.5)
IMM GRANULOCYTES NFR BLD AUTO: 0.3 % — SIGNIFICANT CHANGE UP (ref 0–1.5)
INR BLD: 0.91 — SIGNIFICANT CHANGE UP (ref 0.88–1.16)
LYMPHOCYTES # BLD AUTO: 1.59 K/UL — SIGNIFICANT CHANGE UP (ref 1–3.3)
LYMPHOCYTES # BLD AUTO: 25.5 % — SIGNIFICANT CHANGE UP (ref 13–44)
MCHC RBC-ENTMCNC: 28.6 PG — SIGNIFICANT CHANGE UP (ref 27–34)
MCHC RBC-ENTMCNC: 31.5 GM/DL — LOW (ref 32–36)
MCV RBC AUTO: 90.9 FL — SIGNIFICANT CHANGE UP (ref 80–100)
MONOCYTES # BLD AUTO: 0.48 K/UL — SIGNIFICANT CHANGE UP (ref 0–0.9)
MONOCYTES NFR BLD AUTO: 7.7 % — SIGNIFICANT CHANGE UP (ref 2–14)
NEUTROPHILS # BLD AUTO: 3.87 K/UL — SIGNIFICANT CHANGE UP (ref 1.8–7.4)
NEUTROPHILS NFR BLD AUTO: 62.2 % — SIGNIFICANT CHANGE UP (ref 43–77)
NRBC # BLD: 0 /100 WBCS — SIGNIFICANT CHANGE UP (ref 0–0)
PLATELET # BLD AUTO: 241 K/UL — SIGNIFICANT CHANGE UP (ref 150–400)
POTASSIUM SERPL-MCNC: 4.8 MMOL/L — SIGNIFICANT CHANGE UP (ref 3.5–5.3)
POTASSIUM SERPL-SCNC: 4.8 MMOL/L — SIGNIFICANT CHANGE UP (ref 3.5–5.3)
PROT SERPL-MCNC: 7.3 G/DL — SIGNIFICANT CHANGE UP (ref 6–8.3)
PROTHROM AB SERPL-ACNC: 10.3 SEC — SIGNIFICANT CHANGE UP (ref 10–12.9)
RBC # BLD: 4.16 M/UL — SIGNIFICANT CHANGE UP (ref 3.8–5.2)
RBC # FLD: 13.3 % — SIGNIFICANT CHANGE UP (ref 10.3–14.5)
SODIUM SERPL-SCNC: 142 MMOL/L — SIGNIFICANT CHANGE UP (ref 135–145)
WBC # BLD: 6.23 K/UL — SIGNIFICANT CHANGE UP (ref 3.8–10.5)
WBC # FLD AUTO: 6.23 K/UL — SIGNIFICANT CHANGE UP (ref 3.8–10.5)

## 2020-01-05 PROCEDURE — 85025 COMPLETE CBC W/AUTO DIFF WBC: CPT

## 2020-01-05 PROCEDURE — 82962 GLUCOSE BLOOD TEST: CPT

## 2020-01-05 PROCEDURE — 80053 COMPREHEN METABOLIC PANEL: CPT

## 2020-01-05 PROCEDURE — 99283 EMERGENCY DEPT VISIT LOW MDM: CPT | Mod: 25

## 2020-01-05 PROCEDURE — 85730 THROMBOPLASTIN TIME PARTIAL: CPT

## 2020-01-05 PROCEDURE — 96360 HYDRATION IV INFUSION INIT: CPT

## 2020-01-05 PROCEDURE — 93005 ELECTROCARDIOGRAM TRACING: CPT

## 2020-01-05 PROCEDURE — 36415 COLL VENOUS BLD VENIPUNCTURE: CPT

## 2020-01-05 PROCEDURE — 99284 EMERGENCY DEPT VISIT MOD MDM: CPT

## 2020-01-05 PROCEDURE — 85610 PROTHROMBIN TIME: CPT

## 2020-01-05 PROCEDURE — 93010 ELECTROCARDIOGRAM REPORT: CPT

## 2020-01-05 RX ORDER — SODIUM CHLORIDE 9 MG/ML
1000 INJECTION INTRAMUSCULAR; INTRAVENOUS; SUBCUTANEOUS ONCE
Refills: 0 | Status: COMPLETED | OUTPATIENT
Start: 2020-01-05 | End: 2020-01-05

## 2020-01-05 RX ORDER — MECLIZINE HCL 12.5 MG
25 TABLET ORAL ONCE
Refills: 0 | Status: COMPLETED | OUTPATIENT
Start: 2020-01-05 | End: 2020-01-05

## 2020-01-05 RX ORDER — MECLIZINE HCL 12.5 MG
1 TABLET ORAL
Qty: 20 | Refills: 0
Start: 2020-01-05

## 2020-01-05 RX ORDER — CIPROFLOXACIN AND DEXAMETHASONE 3; 1 MG/ML; MG/ML
4 SUSPENSION/ DROPS AURICULAR (OTIC) ONCE
Refills: 0 | Status: COMPLETED | OUTPATIENT
Start: 2020-01-05 | End: 2020-01-05

## 2020-01-05 RX ORDER — ACETAMINOPHEN 500 MG
1000 TABLET ORAL ONCE
Refills: 0 | Status: COMPLETED | OUTPATIENT
Start: 2020-01-05 | End: 2020-01-05

## 2020-01-05 RX ADMIN — Medication 1000 MILLIGRAM(S): at 11:57

## 2020-01-05 RX ADMIN — Medication 25 MILLIGRAM(S): at 12:08

## 2020-01-05 RX ADMIN — SODIUM CHLORIDE 1000 MILLILITER(S): 9 INJECTION INTRAMUSCULAR; INTRAVENOUS; SUBCUTANEOUS at 11:57

## 2020-01-05 RX ADMIN — CIPROFLOXACIN AND DEXAMETHASONE 4 DROP(S): 3; 1 SUSPENSION/ DROPS AURICULAR (OTIC) at 12:27

## 2020-01-05 RX ADMIN — SODIUM CHLORIDE 1000 MILLILITER(S): 9 INJECTION INTRAMUSCULAR; INTRAVENOUS; SUBCUTANEOUS at 12:57

## 2020-01-05 RX ADMIN — Medication 1000 MILLIGRAM(S): at 12:27

## 2020-01-05 NOTE — ED PROVIDER NOTE - OBJECTIVE STATEMENT
69F here w/ acute onset R ear pain, bleeding and vertiginous dizziness x 1 day. never had this before. no trouble speaking swallowing double vision . no recent illness, no uri symptoms. feels like it is more difficult to hear out of that ear;. no recent swimming.

## 2020-01-05 NOTE — ED PROVIDER NOTE - CARE PROVIDER_API CALL
Reina Tse)  Otolaryngology  [address] [place] [address]  Phone: [phone]  Fax: [phone]  Follow Up Time:     Ferdinand Garrett)  Otolaryngology  [address], [address]  [place] [address]  Phone: [phone]  Fax: [phone]  Follow Up Time:

## 2020-01-05 NOTE — ED PROVIDER NOTE - CARE PROVIDERS DIRECT ADDRESSES
[email or web address]

## 2020-01-05 NOTE — ED ADULT NURSE NOTE - CHIEF COMPLAINT QUOTE
pt c/o headache, dizziness, R ear bleeding since this morning. hx DM. fs 114. pt also reports neck stiffness. denies any fevers.

## 2020-01-05 NOTE — ED PROVIDER NOTE - PATIENT PORTAL LINK FT
You can access the FollowMyHealth Patient Portal offered by [facility] by registering at the following website: [email or web address]. By joining [employer]’s FollowMyHealth portal, you will also be able to view your health information using other applications (apps) compatible with our system.

## 2020-01-05 NOTE — ED PROVIDER NOTE - PHYSICAL EXAMINATION
CONSTITUTIONAL: Well-appearing; well-nourished; in no apparent distress.   HEAD: Normocephalic; atraumatic.   EYES:  conjunctiva and sclera clear  ENT: normal nose; no rhinorrhea; normal pharynx with no erythema or lesions. R ear canal with white crusting, cerumen, dried blood, unable to visualize TM  NECK: Supple; non-tender;   CARDIOVASCULAR: Normal S1, S2; no murmurs, rubs, or gallops. Regular rate and rhythm.   RESPIRATORY: Breathing easily; breath sounds clear and equal bilaterally; no wheezes, rhonchi, or rales.  GI: Soft; non-distended; non-tender; no palpable organomegaly.   EXT: No cyanosis or edema; N/V intact  SKIN: Normal for age and race; warm; dry; good turgor; no apparent lesions or rash.   NEURO: A & O x 3; face symmetric; grossly unremarkable.   PSYCHOLOGICAL: The patient’s mood and manner are appropriate.

## 2020-01-05 NOTE — ED PROVIDER NOTE - NSFOLLOWUPCLINICS_GEN_ALL_ED_FT
[facility]  Otolaryngology (ENT)  [address] [place] [place] [address] - [address]  [place], [place] [address]  Phone: [phone]  Fax:

## 2020-01-05 NOTE — ED PROVIDER NOTE - CLINICAL SUMMARY MEDICAL DECISION MAKING FREE TEXT BOX
here w/ otitis externa with mild bleeding, started on drops, and likely vertigo 2/2 this. plan for outpatient ent eval

## 2020-01-05 NOTE — ED PROVIDER NOTE - NSFOLLOWUPINSTRUCTIONS_ED_ALL_ED_FT
Please follow up with an ENT doctor as I could not well visualize the ear.       Vertigo  Vertigo means that you feel like you are moving when you are not. Vertigo can also make you feel like things around you are moving when they are not. This feeling can come and go at any time. Vertigo often goes away on its own.    Follow these instructions at home:  Avoid making fast movements.  Avoid driving.  Avoid using heavy machinery.  Avoid doing any task or activity that might cause danger to you or other people if you would have a vertigo attack while you are doing it.  Sit down right away if you feel dizzy or have trouble with your balance.  Take over-the-counter and prescription medicines only as told by your doctor.  Follow instructions from your doctor about which positions or movements you should avoid.  Drink enough fluid to keep your pee (urine) clear or pale yellow.  Keep all follow-up visits as told by your doctor. This is important.  Contact a doctor if:  Medicine does not help your vertigo.  You have a fever.  Your problems get worse or you have new symptoms.  Your family or friends see changes in your behavior.  You feel sick to your stomach (nauseous) or you throw up (vomit).  You have a “pins and needles” feeling or you are numb in part of your body.  Get help right away if:  You have trouble moving or talking.  You are always dizzy.  You pass out (faint).  You get very bad headaches.  You feel weak or have trouble using your hands, arms, or legs.  You have changes in your hearing.  You have changes in your seeing (vision).  You get a stiff neck.  Bright light starts to bother you.

## 2020-01-05 NOTE — ED ADULT TRIAGE NOTE - CHIEF COMPLAINT QUOTE
pt c/o headache, dizziness, R ear bleeding since this morning. hx DM. fs 114. pt c/o headache, dizziness, R ear bleeding since this morning. hx DM. fs 114. pt also reports neck stiffness. denies any fevers.

## 2020-01-05 NOTE — ED ADULT NURSE NOTE - OBJECTIVE STATEMENT
AOX4 +ambulatory patient reports sudden onset of right ear bleeding x this morning. Patient also complaining of headache and dizziness. Patient denies any chest pains shortness of breath weakness or trauma

## 2020-01-08 ENCOUNTER — APPOINTMENT (OUTPATIENT)
Dept: OTOLARYNGOLOGY | Facility: CLINIC | Age: 70
End: 2020-01-08
Payer: MEDICARE

## 2020-01-08 VITALS
SYSTOLIC BLOOD PRESSURE: 128 MMHG | DIASTOLIC BLOOD PRESSURE: 78 MMHG | TEMPERATURE: 97.8 F | HEART RATE: 72 BPM | HEIGHT: 60 IN | BODY MASS INDEX: 27.48 KG/M2 | WEIGHT: 140 LBS | OXYGEN SATURATION: 98 % | RESPIRATION RATE: 15 BRPM

## 2020-01-08 DIAGNOSIS — Z83.3 FAMILY HISTORY OF DIABETES MELLITUS: ICD-10-CM

## 2020-01-08 DIAGNOSIS — Z87.891 PERSONAL HISTORY OF NICOTINE DEPENDENCE: ICD-10-CM

## 2020-01-08 DIAGNOSIS — Z82.49 FAMILY HISTORY OF ISCHEMIC HEART DISEASE AND OTHER DISEASES OF THE CIRCULATORY SYSTEM: ICD-10-CM

## 2020-01-08 DIAGNOSIS — Z78.9 OTHER SPECIFIED HEALTH STATUS: ICD-10-CM

## 2020-01-08 DIAGNOSIS — Z82.2 FAMILY HISTORY OF DEAFNESS AND HEARING LOSS: ICD-10-CM

## 2020-01-08 PROCEDURE — 92504 EAR MICROSCOPY EXAMINATION: CPT

## 2020-01-08 PROCEDURE — 99203 OFFICE O/P NEW LOW 30 MIN: CPT | Mod: 25

## 2020-01-08 NOTE — REASON FOR VISIT
[Initial Evaluation] : an initial evaluation for [FreeTextEntry2] : Bleeding Rt ear and ext otitis and ceruman

## 2020-01-09 ENCOUNTER — APPOINTMENT (OUTPATIENT)
Dept: OTOLARYNGOLOGY | Facility: CLINIC | Age: 70
End: 2020-01-09

## 2020-01-21 ENCOUNTER — APPOINTMENT (OUTPATIENT)
Dept: OTOLARYNGOLOGY | Facility: CLINIC | Age: 70
End: 2020-01-21
Payer: MEDICARE

## 2020-01-21 PROCEDURE — 92550 TYMPANOMETRY & REFLEX THRESH: CPT

## 2020-01-21 PROCEDURE — 92557 COMPREHENSIVE HEARING TEST: CPT

## 2020-01-21 PROCEDURE — 99213 OFFICE O/P EST LOW 20 MIN: CPT

## 2020-01-21 NOTE — PHYSICAL EXAM
[Midline] : trachea located in midline position [Normal] : orientation to person, place, and time: normal [de-identified] : cleaned w microscope and suctioned [de-identified] : Bleeding Rt ear and ext otitis and cerumen

## 2020-01-21 NOTE — HISTORY OF PRESENT ILLNESS
[de-identified] : 69 years old female patient with history of Otitis externa in the right ear.  Patient is present today in the office with Right side Otitis externa.

## 2020-01-21 NOTE — REASON FOR VISIT
[Subsequent Evaluation] : a subsequent evaluation for [Spouse] : spouse [FreeTextEntry2] : Otitis externa in the right ear

## 2021-02-17 ENCOUNTER — EMERGENCY (EMERGENCY)
Facility: HOSPITAL | Age: 71
LOS: 1 days | Discharge: HOME | End: 2021-02-17
Attending: EMERGENCY MEDICINE | Admitting: EMERGENCY MEDICINE
Payer: MEDICARE

## 2021-02-17 VITALS
SYSTOLIC BLOOD PRESSURE: 183 MMHG | RESPIRATION RATE: 18 BRPM | OXYGEN SATURATION: 98 % | TEMPERATURE: 98 F | WEIGHT: 139.99 LBS | DIASTOLIC BLOOD PRESSURE: 97 MMHG | HEIGHT: 60 IN | HEART RATE: 90 BPM

## 2021-02-17 VITALS
DIASTOLIC BLOOD PRESSURE: 77 MMHG | TEMPERATURE: 98 F | OXYGEN SATURATION: 97 % | RESPIRATION RATE: 18 BRPM | SYSTOLIC BLOOD PRESSURE: 145 MMHG | HEART RATE: 64 BPM

## 2021-02-17 DIAGNOSIS — Z79.899 OTHER LONG TERM (CURRENT) DRUG THERAPY: ICD-10-CM

## 2021-02-17 DIAGNOSIS — Z79.4 LONG TERM (CURRENT) USE OF INSULIN: ICD-10-CM

## 2021-02-17 DIAGNOSIS — Z20.822 CONTACT WITH AND (SUSPECTED) EXPOSURE TO COVID-19: ICD-10-CM

## 2021-02-17 DIAGNOSIS — M79.674 PAIN IN RIGHT TOE(S): ICD-10-CM

## 2021-02-17 DIAGNOSIS — I10 ESSENTIAL (PRIMARY) HYPERTENSION: ICD-10-CM

## 2021-02-17 DIAGNOSIS — L03.032 CELLULITIS OF LEFT TOE: ICD-10-CM

## 2021-02-17 DIAGNOSIS — E11.9 TYPE 2 DIABETES MELLITUS WITHOUT COMPLICATIONS: ICD-10-CM

## 2021-02-17 DIAGNOSIS — Z98.890 OTHER SPECIFIED POSTPROCEDURAL STATES: Chronic | ICD-10-CM

## 2021-02-17 LAB
ALBUMIN SERPL ELPH-MCNC: 4.3 G/DL — SIGNIFICANT CHANGE UP (ref 3.3–5)
ALP SERPL-CCNC: 91 U/L — SIGNIFICANT CHANGE UP (ref 40–120)
ALT FLD-CCNC: 19 U/L — SIGNIFICANT CHANGE UP (ref 10–45)
ANION GAP SERPL CALC-SCNC: 12 MMOL/L — SIGNIFICANT CHANGE UP (ref 5–17)
APTT BLD: 36.8 SEC — HIGH (ref 27.5–35.5)
AST SERPL-CCNC: 22 U/L — SIGNIFICANT CHANGE UP (ref 10–40)
BASOPHILS # BLD AUTO: 0.05 K/UL — SIGNIFICANT CHANGE UP (ref 0–0.2)
BASOPHILS NFR BLD AUTO: 0.6 % — SIGNIFICANT CHANGE UP (ref 0–2)
BILIRUB SERPL-MCNC: 0.7 MG/DL — SIGNIFICANT CHANGE UP (ref 0.2–1.2)
BUN SERPL-MCNC: 16 MG/DL — SIGNIFICANT CHANGE UP (ref 7–23)
CALCIUM SERPL-MCNC: 9.7 MG/DL — SIGNIFICANT CHANGE UP (ref 8.4–10.5)
CHLORIDE SERPL-SCNC: 106 MMOL/L — SIGNIFICANT CHANGE UP (ref 96–108)
CO2 SERPL-SCNC: 26 MMOL/L — SIGNIFICANT CHANGE UP (ref 22–31)
CREAT SERPL-MCNC: 0.63 MG/DL — SIGNIFICANT CHANGE UP (ref 0.5–1.3)
CRP SERPL-MCNC: 0.07 MG/DL — SIGNIFICANT CHANGE UP (ref 0–0.4)
EOSINOPHIL # BLD AUTO: 0.09 K/UL — SIGNIFICANT CHANGE UP (ref 0–0.5)
EOSINOPHIL NFR BLD AUTO: 1 % — SIGNIFICANT CHANGE UP (ref 0–6)
ERYTHROCYTE [SEDIMENTATION RATE] IN BLOOD: 22 MM/HR — SIGNIFICANT CHANGE UP
GLUCOSE SERPL-MCNC: 124 MG/DL — HIGH (ref 70–99)
HCT VFR BLD CALC: 36.7 % — SIGNIFICANT CHANGE UP (ref 34.5–45)
HGB BLD-MCNC: 12.3 G/DL — SIGNIFICANT CHANGE UP (ref 11.5–15.5)
IMM GRANULOCYTES NFR BLD AUTO: 0.3 % — SIGNIFICANT CHANGE UP (ref 0–1.5)
INR BLD: 0.96 — SIGNIFICANT CHANGE UP (ref 0.88–1.16)
LACTATE SERPL-SCNC: 1 MMOL/L — SIGNIFICANT CHANGE UP (ref 0.5–2)
LYMPHOCYTES # BLD AUTO: 1.49 K/UL — SIGNIFICANT CHANGE UP (ref 1–3.3)
LYMPHOCYTES # BLD AUTO: 17.2 % — SIGNIFICANT CHANGE UP (ref 13–44)
MCHC RBC-ENTMCNC: 30.5 PG — SIGNIFICANT CHANGE UP (ref 27–34)
MCHC RBC-ENTMCNC: 33.5 GM/DL — SIGNIFICANT CHANGE UP (ref 32–36)
MCV RBC AUTO: 91.1 FL — SIGNIFICANT CHANGE UP (ref 80–100)
MONOCYTES # BLD AUTO: 0.39 K/UL — SIGNIFICANT CHANGE UP (ref 0–0.9)
MONOCYTES NFR BLD AUTO: 4.5 % — SIGNIFICANT CHANGE UP (ref 2–14)
NEUTROPHILS # BLD AUTO: 6.59 K/UL — SIGNIFICANT CHANGE UP (ref 1.8–7.4)
NEUTROPHILS NFR BLD AUTO: 76.4 % — SIGNIFICANT CHANGE UP (ref 43–77)
NRBC # BLD: 0 /100 WBCS — SIGNIFICANT CHANGE UP (ref 0–0)
PLATELET # BLD AUTO: 213 K/UL — SIGNIFICANT CHANGE UP (ref 150–400)
POTASSIUM SERPL-MCNC: 3.4 MMOL/L — LOW (ref 3.5–5.3)
POTASSIUM SERPL-SCNC: 3.4 MMOL/L — LOW (ref 3.5–5.3)
PROT SERPL-MCNC: 7.1 G/DL — SIGNIFICANT CHANGE UP (ref 6–8.3)
PROTHROM AB SERPL-ACNC: 11.5 SEC — SIGNIFICANT CHANGE UP (ref 10.6–13.6)
RBC # BLD: 4.03 M/UL — SIGNIFICANT CHANGE UP (ref 3.8–5.2)
RBC # FLD: 12.8 % — SIGNIFICANT CHANGE UP (ref 10.3–14.5)
SARS-COV-2 RNA SPEC QL NAA+PROBE: SIGNIFICANT CHANGE UP
SODIUM SERPL-SCNC: 144 MMOL/L — SIGNIFICANT CHANGE UP (ref 135–145)
WBC # BLD: 8.64 K/UL — SIGNIFICANT CHANGE UP (ref 3.8–10.5)
WBC # FLD AUTO: 8.64 K/UL — SIGNIFICANT CHANGE UP (ref 3.8–10.5)

## 2021-02-17 PROCEDURE — 80053 COMPREHEN METABOLIC PANEL: CPT

## 2021-02-17 PROCEDURE — 85652 RBC SED RATE AUTOMATED: CPT

## 2021-02-17 PROCEDURE — U0003: CPT

## 2021-02-17 PROCEDURE — 36415 COLL VENOUS BLD VENIPUNCTURE: CPT

## 2021-02-17 PROCEDURE — 73630 X-RAY EXAM OF FOOT: CPT

## 2021-02-17 PROCEDURE — 86140 C-REACTIVE PROTEIN: CPT

## 2021-02-17 PROCEDURE — 85025 COMPLETE CBC W/AUTO DIFF WBC: CPT

## 2021-02-17 PROCEDURE — 87040 BLOOD CULTURE FOR BACTERIA: CPT

## 2021-02-17 PROCEDURE — 83605 ASSAY OF LACTIC ACID: CPT

## 2021-02-17 PROCEDURE — 73660 X-RAY EXAM OF TOE(S): CPT | Mod: 26,RT,59

## 2021-02-17 PROCEDURE — 99284 EMERGENCY DEPT VISIT MOD MDM: CPT

## 2021-02-17 PROCEDURE — 85610 PROTHROMBIN TIME: CPT

## 2021-02-17 PROCEDURE — U0005: CPT

## 2021-02-17 PROCEDURE — 73630 X-RAY EXAM OF FOOT: CPT | Mod: 26,RT

## 2021-02-17 PROCEDURE — 85730 THROMBOPLASTIN TIME PARTIAL: CPT

## 2021-02-17 PROCEDURE — 73660 X-RAY EXAM OF TOE(S): CPT

## 2021-02-17 RX ORDER — AZTREONAM 2 G
1 VIAL (EA) INJECTION
Qty: 14 | Refills: 0
Start: 2021-02-17 | End: 2021-02-23

## 2021-02-17 RX ADMIN — Medication 1 TABLET(S): at 14:47

## 2021-02-17 NOTE — ED PROVIDER NOTE - OBJECTIVE STATEMENT
70F, sent in by Dr. Pradhan (Podiatrist), presents after R great toe infection for  x2 weeks, s/p Cefadroxil treatment over the course of x1 week for toe infection. Pt f/u with podiatrist for the slow worsening of pain despite abx treatment. Pt denies any f/c, rigors or any other medical complaints at this time.  Pt admits to pain isolated in MCP and DIP states and that pain radiates to other toes upon movement. Pt denies any recent traumas or injuries. Pt denies any prior occurrence of current sxs.

## 2021-02-17 NOTE — ED PROVIDER NOTE - PATIENT PORTAL LINK FT
You can access the FollowMyHealth Patient Portal offered by [facility] by registering at the following website: [email or web address]. By joining [facility]’s FollowMyHealth portal, you will also be able to view your health information using other applications (apps) compatible with our system.

## 2021-02-17 NOTE — CONSULT NOTE ADULT - ASSESSMENT
71yo F with PMHx DM2 (reports last A1c of 7%), HTN, HLD, COPD presents to [facility] ED for right great toe pain, referred by podiatrist Dr. Placido Pradhan. Pt underwent ingrown nail removal during outpatient office visit on 2/9/21 and was prescribed Cefadroxil 500mg q12 for 1wk. Pt states that the right great toe has remained red, swollen and painful since the ingrown nail removal. Mild skin and soft tissue infection of right hallux:       P:    71yo F with PMHx DM2 (reports last A1c of 7%), HTN, HLD, COPD presents to [facility] ED for right great toe pain, referred by podiatrist Dr. Placido Pradhan. Pt underwent ingrown nail removal during outpatient office visit on 2/9/21 and was prescribed Cefadroxil 500mg q12 for 1wk. Pt states that the right great toe has remained red, swollen and painful since the ingrown nail removal. Mild skin and soft tissue infection of right hallux. WBC 8. Vitals WNL.       P:    71yo F with PMHx DM2 (reports last A1c of 7%), HTN, HLD, COPD presents to [facility] ED for right great toe pain, referred by podiatrist Dr. Placido Pradhan. Pt underwent ingrown nail removal during outpatient office visit on 2/9/21 and was prescribed Cefadroxil 500mg q12 for 1wk. Pt states that the right great toe has remained red, swollen and painful since the ingrown nail removal. Mild skin and soft tissue infection of right hallux. WBC 8. Vitals WNL. R foot radiographs unremarkable, low suspicion of osteomyelitis at this time.       P:   Start Bactrim DS 1 tablet q12hr for 1 week   Follow-up with podiatrist Dr. Pradhan in 1 week to evaluate appearance/symptoms of right hallux.     Alternative provider for follow-up.   Patient should follow up with Dr. Edilberto Myrick within 1 week following ED visit.     Office information:          [place] Address- [address], [place] [address] Phone: [phone]         [place] Address- [address], [place] [address] Phone: [phone]   69yo F with PMHx DM2 (reports last A1c of 7%), HTN, HLD, COPD presents to [facility] ED for right great toe pain, referred by podiatrist Dr. Placido Pradhan. Pt underwent ingrown nail removal during outpatient office visit on 2/9/21 and was prescribed Cefadroxil 500mg q12 for 1wk. Pt states that the right great toe has remained red, swollen and painful since the ingrown nail removal. Mild skin and soft tissue infection of right hallux. WBC 8. Vitals WNL. R foot radiographs unremarkable, low suspicion of osteomyelitis at this time.       P:   Start Bactrim DS 1 tablet q12hr for 1 week   Follow-up with podiatrist Dr. Placido Pradhan in 1 week to evaluate appearance/symptoms of right hallux.     Alternative provider for follow-up.   Patient should follow up with Dr. Edilberto Myrick within 1 week following ED visit.     Office information:          [place] Address- [address] [place] [address] Phone: [phone]         [place] Address- [address], [place] [address] Phone: [phone]

## 2021-02-17 NOTE — ED PROVIDER NOTE - CLINICAL SUMMARY MEDICAL DECISION MAKING FREE TEXT BOX
70F present with right great toe infection and pain despite abx treatment    Plan: Will await podiatrist consult. Will order labs (including ESR, CRP)  to r/o osteomyelitis. Will order XRAY to r/o gas  or  osteomyelitis.

## 2021-02-17 NOTE — ED PROVIDER NOTE - PHYSICAL EXAMINATION
CONSTITUTIONAL: Well-appearing; well-nourished; in no apparent distress.   HEAD: Normocephalic; atraumatic.   EYES:  conjunctiva and sclera clear  ENT: normal nose; no rhinorrhea;  NECK: Supple; full ROM  RESPIRATORY: Breathing easily; no resp difficulty  EXT: Mild erythema of right MCP and right DIP; right great toe nail  is thick secondary to fugal changes, no breaks noted in the skin; no erythema to rest of toes.   DP pulse weak but palpable,   SKIN: Normal for age and race; warm; dry; good turgor; no apparent lesions or rash.   NEURO: A & O x 3; face symmetric; grossly unremarkable.   PSYCHOLOGICAL: The patient’s mood and manner are appropriate.

## 2021-02-17 NOTE — ED ADULT NURSE NOTE - OBJECTIVE STATEMENT
Patient alert and oriented x 3 came c/o right  big toe pain for 9 days now , Pt. went to podiatrist feb 2 for infected ingrown toe nail , presribed with antibiotic with relief . Pain still worse , went for ff up today was sent here for ivf abt for r/o osteomyelitis ,  . Denies any fever nor chills . History of DM . No numbness nor tingling sensation . Been taking tylenol with no relief .  Seen and examined by Dr. Jaimes , all labs sent .

## 2021-02-17 NOTE — CONSULT NOTE ADULT - SUBJECTIVE AND OBJECTIVE BOX
Attending:    Patient is a 70y old  Female who presents with a chief complaint of R hallux pain.     HPI:  71yo F with PMHx DM2 (reports last A1c of 7%), HTN, HLD, COPD, s/p back surgery (Nov 2020) presents to [facility] ED for right great toe pain, referred by podiatrist Dr. Placido Pradhan. Pt underwent ingrown nail removal during outpatient office visit on 2/9/21 and was prescribed Cefadroxil 500mg q12 for 1wk. Pt states that the right great toe has remained red, swollen and painful since the ingrown nail removal, never noticed significant improvement. Endorses compliance with oral course of abx. Occasionally feels a pins & needles sensation in her toes that started after her back surgery in 2020. Additionally, states that she has had some nausea and dizziness over the past few days which she had not felt prior to the ingrown nail removal. Denies fever, vomiting, chills, shortness of breath, chest pain.     Review of systems negative except per HPI    PAST MEDICAL & SURGICAL HISTORY:  Hypertension    High cholesterol    Diabetes    Asthma    H/O laminectomy  1990, 1991      Home Medications:  atorvastatin: 30 milligram(s) orally once a day (at bedtime) (05 Jan 2020 12:59)  Basaglar KwikPen 100 units/mL subcutaneous solution: 25 unit(s) subcutaneous once a day (at bedtime) (05 Jan 2020 12:59)  Incruse Ellipta 62.5 mcg/inh inhalation powder: 1 puff(s) inhaled once a day (05 Jan 2020 12:59)  lisinopril 20 mg oral tablet: 1 tab(s) orally once a day (05 Jan 2020 12:59)  raNITIdine 300 mg oral tablet: 1 tab(s) orally once a day (at bedtime) (05 Jan 2020 12:59)  Symbicort 160 mcg-4.5 mcg/inh inhalation aerosol: 2 puff(s) inhaled 2 times a day (05 Jan 2020 12:59)    Allergies    No Known Allergies    Intolerances      FAMILY HISTORY:  FH: diabetes mellitus  mother and father      Social History: Current smoker (1/2 PPD). Denies alcohol and recreational drug use. Lives at home with her .       LABS                        12.3   8.64  )-----------( 213      ( 17 Feb 2021 12:47 )             36.7     02-17    x   |  x   |  16  ----------------------------<  x   x    |  26  |  0.63    Ca    9.7      17 Feb 2021 12:47      PT/INR - ( 17 Feb 2021 12:47 )   PT: 11.5 sec;   INR: 0.96          PTT - ( 17 Feb 2021 12:47 )  PTT:36.8 sec    Vital Signs Last 24 Hrs  T(C): 36.8 (17 Feb 2021 12:09), Max: 36.8 (17 Feb 2021 12:09)  T(F): 98.2 (17 Feb 2021 12:09), Max: 98.2 (17 Feb 2021 12:09)  HR: 90 (17 Feb 2021 12:09) (90 - 90)  BP: 183/97 (17 Feb 2021 12:09) (183/97 - 183/97)  BP(mean): --  RR: 18 (17 Feb 2021 12:09) (18 - 18)  SpO2: 98% (17 Feb 2021 12:09) (98% - 98%)    PHYSICAL EXAM  General: NAD, AA0x3    Lower Extremity Focused:  Vasc: DP/PT 2/4 B/L. CFT <2 secs x 10. Increased warmth to right hallux as compared to the neighboring digits and left foot.   Derm: Mild erythema noted to right hallux. No drainage/pus. No malodor.   Neuro: Protective sensation grossly intact b/l.   MSK: 5/5 muscle strength in all crural compartments of both LEs. TTP of right hallux.     RADIOLOGY                       Attending:    Patient is a 70y old  Female who presents with a chief complaint of R hallux pain.     HPI:  69yo F with PMHx DM2 (reports last A1c of 7%), HTN, HLD, COPD, s/p back surgery (Nov 2020) presents to [facility] ED for right great toe pain, referred by podiatrist Dr. Pradhan. Pt underwent ingrown nail removal during outpatient office visit on 2/9/21 and was prescribed Cefadroxil 500mg q12 for 1wk. Pt states that the right great toe has remained red, swollen and painful since the ingrown nail removal, never noticed significant improvement. Endorses compliance with oral course of abx. Occasionally feels a pins & needles sensation in her toes that started after her back surgery in 2020. Additionally, states that she has had some nausea and dizziness over the past few days which she had not felt prior to the ingrown nail removal. Denies fever, vomiting, chills, shortness of breath, chest pain.     Review of systems negative except per HPI    PAST MEDICAL & SURGICAL HISTORY:  Hypertension    High cholesterol    Diabetes    Asthma    H/O laminectomy  1990, 1991      Home Medications:  atorvastatin: 30 milligram(s) orally once a day (at bedtime) (05 Jan 2020 12:59)  Basaglar KwikPen 100 units/mL subcutaneous solution: 25 unit(s) subcutaneous once a day (at bedtime) (05 Jan 2020 12:59)  Incruse Ellipta 62.5 mcg/inh inhalation powder: 1 puff(s) inhaled once a day (05 Jan 2020 12:59)  lisinopril 20 mg oral tablet: 1 tab(s) orally once a day (05 Jan 2020 12:59)  raNITIdine 300 mg oral tablet: 1 tab(s) orally once a day (at bedtime) (05 Jan 2020 12:59)  Symbicort 160 mcg-4.5 mcg/inh inhalation aerosol: 2 puff(s) inhaled 2 times a day (05 Jan 2020 12:59)    Allergies    No Known Allergies    Intolerances      FAMILY HISTORY:  FH: diabetes mellitus  mother and father      Social History: Current smoker (1/2 PPD). Denies alcohol and recreational drug use. Lives at home with her .       LABS                        12.3   8.64  )-----------( 213      ( 17 Feb 2021 12:47 )             36.7     02-17    x   |  x   |  16  ----------------------------<  x   x    |  26  |  0.63    Ca    9.7      17 Feb 2021 12:47      PT/INR - ( 17 Feb 2021 12:47 )   PT: 11.5 sec;   INR: 0.96          PTT - ( 17 Feb 2021 12:47 )  PTT:36.8 sec    Vital Signs Last 24 Hrs  T(C): 36.8 (17 Feb 2021 12:09), Max: 36.8 (17 Feb 2021 12:09)  T(F): 98.2 (17 Feb 2021 12:09), Max: 98.2 (17 Feb 2021 12:09)  HR: 90 (17 Feb 2021 12:09) (90 - 90)  BP: 183/97 (17 Feb 2021 12:09) (183/97 - 183/97)  BP(mean): --  RR: 18 (17 Feb 2021 12:09) (18 - 18)  SpO2: 98% (17 Feb 2021 12:09) (98% - 98%)    PHYSICAL EXAM  General: NAD, AA0x3    Lower Extremity Focused:  Vasc: DP/PT 2/4 B/L. CFT <2 secs x 10. Increased warmth to right hallux as compared to the neighboring digits and left foot.   Derm: Mild erythema noted to right hallux. No drainage/pus. No malodor.   Neuro: Protective sensation grossly intact b/l.   MSK: 5/5 muscle strength in all crural compartments of both LEs. TTP of right hallux.     RADIOLOGY  Right foot XR (Wet Read): no significant osseous changes, low suspicion of OM.                      Attending: Edilberto Myrick DPM    Patient is a 70y old  Female who presents with a chief complaint of R hallux pain.     HPI:  71yo F with PMHx DM2 (reports last A1c of 7%), HTN, HLD, COPD, s/p back surgery (Nov 2020) presents to [facility] ED for right great toe pain, referred by podiatrist Dr. Pradhan. Pt underwent ingrown nail removal (both nail borders) during outpatient office visit on 2/9/21 and was prescribed Cefadroxil 500mg q12 for 1wk. Pt states that the right great toe has remained red, swollen and painful since the ingrown nail removal, never noticed significant improvement with antibiotics. Endorses compliance with oral course of abx. Occasionally feels a pins & needles sensation in her toes that started after her back surgery in 2020. Additionally, states that she has had some nausea and dizziness over the past few days which she had not felt prior to the ingrown nail removal. Denies fever, vomiting, chills, shortness of breath, chest pain.     Review of systems negative except per HPI    PAST MEDICAL & SURGICAL HISTORY:  Hypertension    High cholesterol    Diabetes    Asthma    H/O laminectomy  1990, 1991      Home Medications:  atorvastatin: 30 milligram(s) orally once a day (at bedtime) (05 Jan 2020 12:59)  Basaglar KwikPen 100 units/mL subcutaneous solution: 25 unit(s) subcutaneous once a day (at bedtime) (05 Jan 2020 12:59)  Incruse Ellipta 62.5 mcg/inh inhalation powder: 1 puff(s) inhaled once a day (05 Jan 2020 12:59)  lisinopril 20 mg oral tablet: 1 tab(s) orally once a day (05 Jan 2020 12:59)  raNITIdine 300 mg oral tablet: 1 tab(s) orally once a day (at bedtime) (05 Jan 2020 12:59)  Symbicort 160 mcg-4.5 mcg/inh inhalation aerosol: 2 puff(s) inhaled 2 times a day (05 Jan 2020 12:59)    Allergies    No Known Allergies    Intolerances      FAMILY HISTORY:  FH: diabetes mellitus  mother and father      Social History: Current smoker (1/2 PPD). Denies alcohol and recreational drug use. Lives at home with her .       LABS                        12.3   8.64  )-----------( 213      ( 17 Feb 2021 12:47 )             36.7     02-17    x   |  x   |  16  ----------------------------<  x   x    |  26  |  0.63    Ca    9.7      17 Feb 2021 12:47      PT/INR - ( 17 Feb 2021 12:47 )   PT: 11.5 sec;   INR: 0.96          PTT - ( 17 Feb 2021 12:47 )  PTT:36.8 sec    Vital Signs Last 24 Hrs  T(C): 36.8 (17 Feb 2021 12:09), Max: 36.8 (17 Feb 2021 12:09)  T(F): 98.2 (17 Feb 2021 12:09), Max: 98.2 (17 Feb 2021 12:09)  HR: 90 (17 Feb 2021 12:09) (90 - 90)  BP: 183/97 (17 Feb 2021 12:09) (183/97 - 183/97)  BP(mean): --  RR: 18 (17 Feb 2021 12:09) (18 - 18)  SpO2: 98% (17 Feb 2021 12:09) (98% - 98%)    PHYSICAL EXAM  General: NAD, AA0x3    Lower Extremity Focused:  Vasc: DP/PT 2/4 B/L. CFT <2 secs x 10. Increased warmth to right hallux as compared to the neighboring digits and left foot.   Derm: Mild erythema noted to right hallux. No drainage/pus. No malodor.   Neuro: Protective sensation grossly intact b/l.   MSK: 5/5 muscle strength in all crural compartments of both LEs. TTP of right hallux.     RADIOLOGY  Right foot XR (Wet Read): no significant osseous changes, low suspicion of OM.                      Attending: Edilberto Myrick DPM    Patient is a 70y old  Female who presents with a chief complaint of R hallux pain.     HPI:  71yo F with PMHx DM2 (reports last A1c of 7%), HTN, HLD, COPD, s/p back surgery (Nov 2020) presents to [facility] ED for right great toe pain, referred by podiatrist Dr. Placido Pradhan. Pt underwent ingrown nail removal (both great toe nail borders) during outpatient office visit on 2/9/21 and was prescribed Cefadroxil 500mg q12 for 1wk. Pt states that the right great toe has remained red, swollen and painful since the ingrown nail removal, never noticed significant improvement with antibiotics (1 day of abx remaining). Endorses compliance with oral course of abx. Occasionally feels a pins & needles sensation in her toes that started after her back surgery in 2020. Additionally, states that she has had some nausea and dizziness over the past few days which she had not felt prior to the ingrown nail removal. Denies fever, vomiting, chills, shortness of breath, chest pain.     Review of systems negative except per HPI    PAST MEDICAL & SURGICAL HISTORY:  Hypertension    High cholesterol    Diabetes    Asthma    H/O laminectomy  1990, 1991      Home Medications:  atorvastatin: 30 milligram(s) orally once a day (at bedtime) (05 Jan 2020 12:59)  Basaglar KwikPen 100 units/mL subcutaneous solution: 25 unit(s) subcutaneous once a day (at bedtime) (05 Jan 2020 12:59)  Incruse Ellipta 62.5 mcg/inh inhalation powder: 1 puff(s) inhaled once a day (05 Jan 2020 12:59)  lisinopril 20 mg oral tablet: 1 tab(s) orally once a day (05 Jan 2020 12:59)  raNITIdine 300 mg oral tablet: 1 tab(s) orally once a day (at bedtime) (05 Jan 2020 12:59)  Symbicort 160 mcg-4.5 mcg/inh inhalation aerosol: 2 puff(s) inhaled 2 times a day (05 Jan 2020 12:59)    Allergies    No Known Allergies    Intolerances      FAMILY HISTORY:  FH: diabetes mellitus  mother and father      Social History: Current smoker (1/2 PPD). Denies alcohol and recreational drug use. Lives at home with her .       LABS                        12.3   8.64  )-----------( 213      ( 17 Feb 2021 12:47 )             36.7     02-17    x   |  x   |  16  ----------------------------<  x   x    |  26  |  0.63    Ca    9.7      17 Feb 2021 12:47      PT/INR - ( 17 Feb 2021 12:47 )   PT: 11.5 sec;   INR: 0.96          PTT - ( 17 Feb 2021 12:47 )  PTT:36.8 sec    Vital Signs Last 24 Hrs  T(C): 36.8 (17 Feb 2021 12:09), Max: 36.8 (17 Feb 2021 12:09)  T(F): 98.2 (17 Feb 2021 12:09), Max: 98.2 (17 Feb 2021 12:09)  HR: 90 (17 Feb 2021 12:09) (90 - 90)  BP: 183/97 (17 Feb 2021 12:09) (183/97 - 183/97)  BP(mean): --  RR: 18 (17 Feb 2021 12:09) (18 - 18)  SpO2: 98% (17 Feb 2021 12:09) (98% - 98%)    PHYSICAL EXAM  General: NAD, AA0x3    Lower Extremity Focused:  Vasc: DP/PT 2/4 B/L. CFT <2 secs x 10. Increased warmth to right hallux as compared to the neighboring digits and left foot.   Derm: Mild erythema noted to right hallux. No drainage/pus. No malodor.   Neuro: Protective sensation grossly intact b/l.   MSK: 5/5 muscle strength in all crural compartments of both LEs. TTP along dorsal surface of right hallux and to right great toe nail.     RADIOLOGY  Right foot XR (Wet Read): no significant osseous changes, low suspicion of OM.

## 2021-02-17 NOTE — ED PROVIDER NOTE - NSFOLLOWUPINSTRUCTIONS_ED_ALL_ED_FT
Your labs and xray today are not concerning for a bone infection. We are going to change your antibiotic today.   START BACTRIM today  STOP the old antibiotic  See your podiatrist Dr. Pradhan next week to follow up      Cellulitis    Cellulitis is a skin infection caused by bacteria. This condition occurs most often in the arms and lower legs but can occur anywhere over the body. Symptoms include redness, swelling, warm skin, tenderness, and chills/fever. If you were prescribed an antibiotic medicine, take it as told by your health care provider. Do not stop taking the antibiotic even if you start to feel better.    SEEK IMMEDIATE MEDICAL CARE IF YOU HAVE ANY OF THE FOLLOWING SYMPTOMS: worsening fever, red streaks coming from affected area, vomiting or diarrhea, or dizziness/lightheadedness.

## 2021-02-22 LAB
CULTURE RESULTS: SIGNIFICANT CHANGE UP
CULTURE RESULTS: SIGNIFICANT CHANGE UP
SPECIMEN SOURCE: SIGNIFICANT CHANGE UP
SPECIMEN SOURCE: SIGNIFICANT CHANGE UP

## 2021-03-31 ENCOUNTER — EMERGENCY (EMERGENCY)
Facility: HOSPITAL | Age: 71
LOS: 1 days | Discharge: HOME | End: 2021-03-31
Attending: EMERGENCY MEDICINE | Admitting: EMERGENCY MEDICINE
Payer: MEDICARE

## 2021-03-31 VITALS
RESPIRATION RATE: 16 BRPM | SYSTOLIC BLOOD PRESSURE: 120 MMHG | OXYGEN SATURATION: 96 % | HEIGHT: 60 IN | DIASTOLIC BLOOD PRESSURE: 78 MMHG | HEART RATE: 89 BPM | TEMPERATURE: 98 F | WEIGHT: 139.99 LBS

## 2021-03-31 VITALS
DIASTOLIC BLOOD PRESSURE: 65 MMHG | SYSTOLIC BLOOD PRESSURE: 118 MMHG | HEART RATE: 66 BPM | TEMPERATURE: 98 F | RESPIRATION RATE: 16 BRPM | OXYGEN SATURATION: 96 %

## 2021-03-31 DIAGNOSIS — J44.9 CHRONIC OBSTRUCTIVE PULMONARY DISEASE, UNSPECIFIED: ICD-10-CM

## 2021-03-31 DIAGNOSIS — Z20.822 CONTACT WITH AND (SUSPECTED) EXPOSURE TO COVID-19: ICD-10-CM

## 2021-03-31 DIAGNOSIS — M79.674 PAIN IN RIGHT TOE(S): ICD-10-CM

## 2021-03-31 DIAGNOSIS — Z79.2 LONG TERM (CURRENT) USE OF ANTIBIOTICS: ICD-10-CM

## 2021-03-31 DIAGNOSIS — Z98.890 OTHER SPECIFIED POSTPROCEDURAL STATES: ICD-10-CM

## 2021-03-31 DIAGNOSIS — Z79.4 LONG TERM (CURRENT) USE OF INSULIN: ICD-10-CM

## 2021-03-31 DIAGNOSIS — Z79.899 OTHER LONG TERM (CURRENT) DRUG THERAPY: ICD-10-CM

## 2021-03-31 DIAGNOSIS — Z79.51 LONG TERM (CURRENT) USE OF INHALED STEROIDS: ICD-10-CM

## 2021-03-31 DIAGNOSIS — E11.9 TYPE 2 DIABETES MELLITUS WITHOUT COMPLICATIONS: ICD-10-CM

## 2021-03-31 DIAGNOSIS — I10 ESSENTIAL (PRIMARY) HYPERTENSION: ICD-10-CM

## 2021-03-31 DIAGNOSIS — E78.5 HYPERLIPIDEMIA, UNSPECIFIED: ICD-10-CM

## 2021-03-31 DIAGNOSIS — Z79.891 LONG TERM (CURRENT) USE OF OPIATE ANALGESIC: ICD-10-CM

## 2021-03-31 DIAGNOSIS — Z98.890 OTHER SPECIFIED POSTPROCEDURAL STATES: Chronic | ICD-10-CM

## 2021-03-31 LAB
ALBUMIN SERPL ELPH-MCNC: 4.1 G/DL — SIGNIFICANT CHANGE UP (ref 3.3–5)
ALP SERPL-CCNC: 92 U/L — SIGNIFICANT CHANGE UP (ref 40–120)
ALT FLD-CCNC: 17 U/L — SIGNIFICANT CHANGE UP (ref 10–45)
ANION GAP SERPL CALC-SCNC: 13 MMOL/L — SIGNIFICANT CHANGE UP (ref 5–17)
APTT BLD: 36 SEC — HIGH (ref 27.5–35.5)
AST SERPL-CCNC: 17 U/L — SIGNIFICANT CHANGE UP (ref 10–40)
BASOPHILS # BLD AUTO: 0.04 K/UL — SIGNIFICANT CHANGE UP (ref 0–0.2)
BASOPHILS NFR BLD AUTO: 0.5 % — SIGNIFICANT CHANGE UP (ref 0–2)
BILIRUB SERPL-MCNC: 0.6 MG/DL — SIGNIFICANT CHANGE UP (ref 0.2–1.2)
BLD GP AB SCN SERPL QL: NEGATIVE — SIGNIFICANT CHANGE UP
BUN SERPL-MCNC: 17 MG/DL — SIGNIFICANT CHANGE UP (ref 7–23)
CALCIUM SERPL-MCNC: 9.6 MG/DL — SIGNIFICANT CHANGE UP (ref 8.4–10.5)
CHLORIDE SERPL-SCNC: 104 MMOL/L — SIGNIFICANT CHANGE UP (ref 96–108)
CO2 SERPL-SCNC: 25 MMOL/L — SIGNIFICANT CHANGE UP (ref 22–31)
CREAT SERPL-MCNC: 0.69 MG/DL — SIGNIFICANT CHANGE UP (ref 0.5–1.3)
CRP SERPL-MCNC: 1.2 MG/L — SIGNIFICANT CHANGE UP (ref 0–4)
EOSINOPHIL # BLD AUTO: 0.08 K/UL — SIGNIFICANT CHANGE UP (ref 0–0.5)
EOSINOPHIL NFR BLD AUTO: 1 % — SIGNIFICANT CHANGE UP (ref 0–6)
ERYTHROCYTE [SEDIMENTATION RATE] IN BLOOD: 29 MM/HR — HIGH
GLUCOSE SERPL-MCNC: 184 MG/DL — HIGH (ref 70–99)
HCT VFR BLD CALC: 37.5 % — SIGNIFICANT CHANGE UP (ref 34.5–45)
HGB BLD-MCNC: 12.7 G/DL — SIGNIFICANT CHANGE UP (ref 11.5–15.5)
IMM GRANULOCYTES NFR BLD AUTO: 0.5 % — SIGNIFICANT CHANGE UP (ref 0–1.5)
INR BLD: 0.99 — SIGNIFICANT CHANGE UP (ref 0.88–1.16)
LACTATE SERPL-SCNC: 1.5 MMOL/L — SIGNIFICANT CHANGE UP (ref 0.5–2)
LYMPHOCYTES # BLD AUTO: 1.42 K/UL — SIGNIFICANT CHANGE UP (ref 1–3.3)
LYMPHOCYTES # BLD AUTO: 18.4 % — SIGNIFICANT CHANGE UP (ref 13–44)
MCHC RBC-ENTMCNC: 30.8 PG — SIGNIFICANT CHANGE UP (ref 27–34)
MCHC RBC-ENTMCNC: 33.9 GM/DL — SIGNIFICANT CHANGE UP (ref 32–36)
MCV RBC AUTO: 90.8 FL — SIGNIFICANT CHANGE UP (ref 80–100)
MONOCYTES # BLD AUTO: 0.29 K/UL — SIGNIFICANT CHANGE UP (ref 0–0.9)
MONOCYTES NFR BLD AUTO: 3.8 % — SIGNIFICANT CHANGE UP (ref 2–14)
NEUTROPHILS # BLD AUTO: 5.83 K/UL — SIGNIFICANT CHANGE UP (ref 1.8–7.4)
NEUTROPHILS NFR BLD AUTO: 75.8 % — SIGNIFICANT CHANGE UP (ref 43–77)
NRBC # BLD: 0 /100 WBCS — SIGNIFICANT CHANGE UP (ref 0–0)
PLATELET # BLD AUTO: 209 K/UL — SIGNIFICANT CHANGE UP (ref 150–400)
POTASSIUM SERPL-MCNC: 4.1 MMOL/L — SIGNIFICANT CHANGE UP (ref 3.5–5.3)
POTASSIUM SERPL-SCNC: 4.1 MMOL/L — SIGNIFICANT CHANGE UP (ref 3.5–5.3)
PROT SERPL-MCNC: 7 G/DL — SIGNIFICANT CHANGE UP (ref 6–8.3)
PROTHROM AB SERPL-ACNC: 11.9 SEC — SIGNIFICANT CHANGE UP (ref 10.6–13.6)
RBC # BLD: 4.13 M/UL — SIGNIFICANT CHANGE UP (ref 3.8–5.2)
RBC # FLD: 12.7 % — SIGNIFICANT CHANGE UP (ref 10.3–14.5)
RH IG SCN BLD-IMP: POSITIVE — SIGNIFICANT CHANGE UP
SARS-COV-2 RNA SPEC QL NAA+PROBE: SIGNIFICANT CHANGE UP
SODIUM SERPL-SCNC: 142 MMOL/L — SIGNIFICANT CHANGE UP (ref 135–145)
WBC # BLD: 7.7 K/UL — SIGNIFICANT CHANGE UP (ref 3.8–10.5)
WBC # FLD AUTO: 7.7 K/UL — SIGNIFICANT CHANGE UP (ref 3.8–10.5)

## 2021-03-31 PROCEDURE — 85610 PROTHROMBIN TIME: CPT

## 2021-03-31 PROCEDURE — 93010 ELECTROCARDIOGRAM REPORT: CPT

## 2021-03-31 PROCEDURE — 80053 COMPREHEN METABOLIC PANEL: CPT

## 2021-03-31 PROCEDURE — 96367 TX/PROPH/DG ADDL SEQ IV INF: CPT

## 2021-03-31 PROCEDURE — 83605 ASSAY OF LACTIC ACID: CPT

## 2021-03-31 PROCEDURE — U0005: CPT

## 2021-03-31 PROCEDURE — 96375 TX/PRO/DX INJ NEW DRUG ADDON: CPT

## 2021-03-31 PROCEDURE — 99285 EMERGENCY DEPT VISIT HI MDM: CPT | Mod: 25

## 2021-03-31 PROCEDURE — 85652 RBC SED RATE AUTOMATED: CPT

## 2021-03-31 PROCEDURE — 96365 THER/PROPH/DIAG IV INF INIT: CPT

## 2021-03-31 PROCEDURE — 86850 RBC ANTIBODY SCREEN: CPT

## 2021-03-31 PROCEDURE — 36415 COLL VENOUS BLD VENIPUNCTURE: CPT

## 2021-03-31 PROCEDURE — 86140 C-REACTIVE PROTEIN: CPT

## 2021-03-31 PROCEDURE — 73660 X-RAY EXAM OF TOE(S): CPT

## 2021-03-31 PROCEDURE — 93005 ELECTROCARDIOGRAM TRACING: CPT

## 2021-03-31 PROCEDURE — 86900 BLOOD TYPING SEROLOGIC ABO: CPT

## 2021-03-31 PROCEDURE — 86901 BLOOD TYPING SEROLOGIC RH(D): CPT

## 2021-03-31 PROCEDURE — 85730 THROMBOPLASTIN TIME PARTIAL: CPT

## 2021-03-31 PROCEDURE — 85025 COMPLETE CBC W/AUTO DIFF WBC: CPT

## 2021-03-31 PROCEDURE — U0003: CPT

## 2021-03-31 PROCEDURE — 87040 BLOOD CULTURE FOR BACTERIA: CPT

## 2021-03-31 PROCEDURE — 99284 EMERGENCY DEPT VISIT MOD MDM: CPT

## 2021-03-31 PROCEDURE — 73660 X-RAY EXAM OF TOE(S): CPT | Mod: 26,RT

## 2021-03-31 RX ORDER — PIPERACILLIN AND TAZOBACTAM 4; .5 G/20ML; G/20ML
3.38 INJECTION, POWDER, LYOPHILIZED, FOR SOLUTION INTRAVENOUS ONCE
Refills: 0 | Status: COMPLETED | OUTPATIENT
Start: 2021-03-31 | End: 2021-03-31

## 2021-03-31 RX ORDER — MORPHINE SULFATE 50 MG/1
2 CAPSULE, EXTENDED RELEASE ORAL ONCE
Refills: 0 | Status: DISCONTINUED | OUTPATIENT
Start: 2021-03-31 | End: 2021-03-31

## 2021-03-31 RX ORDER — VANCOMYCIN HCL 1 G
1000 VIAL (EA) INTRAVENOUS ONCE
Refills: 0 | Status: COMPLETED | OUTPATIENT
Start: 2021-03-31 | End: 2021-03-31

## 2021-03-31 RX ADMIN — MORPHINE SULFATE 2 MILLIGRAM(S): 50 CAPSULE, EXTENDED RELEASE ORAL at 12:47

## 2021-03-31 RX ADMIN — PIPERACILLIN AND TAZOBACTAM 3.38 GRAM(S): 4; .5 INJECTION, POWDER, LYOPHILIZED, FOR SOLUTION INTRAVENOUS at 13:06

## 2021-03-31 RX ADMIN — Medication 1000 MILLIGRAM(S): at 14:24

## 2021-03-31 RX ADMIN — Medication 250 MILLIGRAM(S): at 13:06

## 2021-03-31 RX ADMIN — PIPERACILLIN AND TAZOBACTAM 200 GRAM(S): 4; .5 INJECTION, POWDER, LYOPHILIZED, FOR SOLUTION INTRAVENOUS at 12:48

## 2021-03-31 RX ADMIN — MORPHINE SULFATE 2 MILLIGRAM(S): 50 CAPSULE, EXTENDED RELEASE ORAL at 13:20

## 2021-03-31 NOTE — ED PROVIDER NOTE - OBJECTIVE STATEMENT
70F PMH DM2, HTN, HLD, COPD p/w R 1st toe pain. Pt had ingrown nail removal during outpatient office visit on 2/9/21 and was prescribed Cefadroxil 500mg q12 for 1wk. Presented to [facility] ED ~6w ago w/ R 1st toe pain/swelling and was dc'd on bactrim w/ improvement. However, now has recurrence of R 1st toe pain and swelling x5d, slowly worsening. Went to podiatrist Dr. Thao today and referred to ED. No other systemic symptoms.   Denies f/c, HA, SOB/CP, NVD, abd pain, URI symptoms, focal weakness/numbness, other joint pains, recent travel, rashes. Minimal relief w/ tylenol PRN.

## 2021-03-31 NOTE — ED PROVIDER NOTE - PATIENT PORTAL LINK FT
You can access the FollowMyHealth Patient Portal offered by [facility] by registering at the following website: [email or web address]. By joining [facility]’s FollowMyHealth portal, you will also be able to view your health information using other applications (apps) compatible with our system.

## 2021-03-31 NOTE — ED ADULT NURSE NOTE - FAMILY HISTORY
Father  Still living? Unknown  FH: diabetes mellitus, Age at diagnosis: Age Unknown     Mother  Still living? Unknown  FH: diabetes mellitus, Age at diagnosis: Age Unknown

## 2021-03-31 NOTE — ED PROVIDER NOTE - PROGRESS NOTE DETAILS
[place]: minimally elevated ESR, other labs grossly wnl. d/w podiatry who evalauted pt and reviewed images/case w/ attg. Do not feel there is any acute infection. Podiatry arranged f/u for pt tomorrow. Pt remains very well appearing. Clinically no indication for further emergent ED workup or hospitalization at this time. Comfortable for dc, outpt f/u.

## 2021-03-31 NOTE — CONSULT NOTE ADULT - SUBJECTIVE AND OBJECTIVE BOX
Attending: Dr. Sb Zayas     Patient is a 70y old  Female who presents with a chief complaint of painful and swollen R 1st toe. The pt states that she experiences pain even to light touch and is unable to WB on the R 1st toe. She states that she also has difficulty flexing her toe, because it causes extreme pain. The patient denies any trauma to the R foot. She states that he was sent in by her Podiatrist for "recurring R great toe infection." Pt denies any malodor or drainage from the toe. She denies any F/N/V/SOB/Chest pain.    HPI: 70F PMH DM2, HTN, HLD, COPD p/w R 1st toe pain. Pt had ingrown nail removal during outpatient office visit on 2/9/21 and was prescribed Cefadroxil 500mg q12 for 1wk. Presented to [facility] ED ~6w ago w/ R 1st toe pain/swelling and was dc'd on bactrim w/ improvement. However, now has recurrence of R 1st toe pain and swelling x5d, slowly worsening. Went to podiatrist Dr. Thao today and referred to ED. No other systemic symptoms.     Review of systems negative except per HPI    PAST MEDICAL & SURGICAL HISTORY:  Asthma    Diabetes    High cholesterol    Hypertension    H/O laminectomy  1990, 1991      Home Medications:  atorvastatin: 30 milligram(s) orally once a day (at bedtime) (17 Feb 2021 14:26)  Basaglar KwikPen 100 units/mL subcutaneous solution: 25 unit(s) subcutaneous once a day (at bedtime) (17 Feb 2021 14:26)  Incruse Ellipta 62.5 mcg/inh inhalation powder: 1 puff(s) inhaled once a day (17 Feb 2021 14:26)  lisinopril 20 mg oral tablet: 1 tab(s) orally once a day (17 Feb 2021 14:26)  raNITIdine 300 mg oral tablet: 1 tab(s) orally once a day (at bedtime) (05 Jan 2020 12:59)  Symbicort 160 mcg-4.5 mcg/inh inhalation aerosol: 2 puff(s) inhaled 2 times a day (05 Jan 2020 12:59)    Allergies    No Known Allergies    Intolerances      FAMILY HISTORY:  FH: diabetes mellitus (Father, Mother)  mother and father      Social History:       LABS                        12.7   7.70  )-----------( 209      ( 31 Mar 2021 12:35 )             37.5     03-31    142  |  104  |  17  ----------------------------<  184<H>  4.1   |  25  |  0.69    Ca    9.6      31 Mar 2021 12:35    TPro  7.0  /  Alb  4.1  /  TBili  0.6  /  DBili  x   /  AST  17  /  ALT  17  /  AlkPhos  92  03-31    PT/INR - ( 31 Mar 2021 12:35 )   PT: 11.9 sec;   INR: 0.99          PTT - ( 31 Mar 2021 12:35 )  PTT:36.0 sec  ESR: 29  CRP: --  03-31 @ 12:35    Vital Signs Last 24 Hrs  T(C): 36.9 (31 Mar 2021 11:31), Max: 36.9 (31 Mar 2021 11:31)  T(F): 98.5 (31 Mar 2021 11:31), Max: 98.5 (31 Mar 2021 11:31)  HR: 89 (31 Mar 2021 11:31) (89 - 89)  BP: 120/78 (31 Mar 2021 11:31) (120/78 - 120/78)  BP(mean): --  RR: 16 (31 Mar 2021 11:31) (16 - 16)  SpO2: 96% (31 Mar 2021 11:31) (96% - 96%)    PHYSICAL EXAM  General: NAD, AA0x3    Lower Extremity Focused:  Vasc: DP/PT: 2/4 b/l, CFT <3sec x10, no edema or erythema present  Derm: No open lesions or signs of infection. No malodor or drainage appreciated  Neuro: Protective sensations intact b/l  MSK: Pt guarding during 1st MTPJ and 1st ray ROM of R foot.     RADIOLOGY

## 2021-03-31 NOTE — CONSULT NOTE ADULT - ASSESSMENT
70F PMH DM2, HTN, HLD, COPD p/w recurring R 1st toe pain 2/2 ingrown nail avulsion.     Plan:   Chronic OM seen on Xrays, however acute infection not appreciated  F/U with Dr. Sb Zayas outpatient tomorrow for removal of ingrown toe nail  No open lesions or signs of acute infection- no drainage or malodor noted    Patient should follow up with Dr. Sb Zayas tomorrow, please call the office to make an appointment.    Office information:          [place] Address- [place]: [address] [place] [address], [address], [place] [address] [phone]

## 2021-03-31 NOTE — ED PROVIDER NOTE - NSFOLLOWUPINSTRUCTIONS_ED_ALL_ED_FT
Your COVID results are pending, can call ER at [phone] to get results. Can take a few hours to a few days to result. If you are "positive," you will get a phone call.     You should follow up with Dr. Sb Zayas tomorrow!! please call the office to make an appointment. Tell them that you were evaluated by the foot doctors in the ER and they told you that you must follow up tomorrow.  Office information:   [place] Address- [facility]: [address] [place] [address], [address] [place] [address], [place] [address] [phone]    Can take tylenol 650mg every 6hrs as needed for pain.  Can also take motrin 600mg every 6hrs as needed for pain (take with food, use may cause stomach issues).  Follow up with primary doctor within 1-2 days.  Return to ER for fevers, persistent vomit, uncontrolled pain, focal weakness/numbness, worsening swelling, spreading redness.    Joint Pain    Joint pain (arthralgia) may be caused by many things. Joint pain is likely to go away when you follow instructions from your health care provider for relieving pain at home. However, joint pain can also be caused by conditions that require more treatment. Common causes of joint pain include:  Bruising in the area of the joint.  Injury caused by repeating certain movements too many times (overuse injury).  Age-related joint wear and tear (osteoarthritis).  Buildup of uric acid crystals in the joint (gout).  Inflammation of the joint (rheumatic disease).  Various other forms of arthritis.  Infections of the joint (septic arthritis) or of the bone (osteomyelitis).    Your health care provider may recommend that you take pain medicine or wear a supportive device like an elastic bandage, sling, or splint. If your joint pain continues, you may need lab or imaging tests to diagnose the cause of your joint pain.    Follow these instructions at home:  Managing pain, stiffness, and swelling   If directed, put ice on the painful area. Icing can help to relieve joint pain and swelling.  Put ice in a plastic bag.  Place a towel between your skin and the bag.  Leave the ice on for 20 minutes, 2–3 times a day.  If directed, apply heat to the painful area as often as told by your health care provider. Heat can reduce the stiffness of your muscles and joints. Use the heat source that your health care provider recommends, such as a moist heat pack or a heating pad.  Place a towel between your skin and the heat source.  Leave the heat on for 20–30 minutes.  Remove the heat if your skin turns bright red. This is especially important if you are unable to feel pain, heat, or cold. You may have a greater risk of getting burned.  Move your fingers or toes below the painful joint often. You can avoid stiffness and lessen swelling by doing this.  If possible, raise (elevate) the painful joint above the level of your heart while you are sitting or lying down. To do this, try putting a few pillows under the painful joint.    Activity   Rest the painful joint for as long as directed. Do not do anything that causes or worsens pain.  Begin exercising or stretching the affected area, as told by your health care provider. Ask your health care provider what types of exercise are safe for you.    If you have an elastic bandage, sling, or splint:   Wear the supportive device as told by your health care provider. Remove it only as told by your health care provider.  Loosen the device if your fingers or toes below the joint tingle, become numb, or turn cold and blue.  Keep the device clean.   Ask your health care provider if you should remove the device before bathing. You may need to cover it with a watertight covering when you take a bath or a shower.    General instructions   Take over-the-counter and prescription medicines only as told by your health care provider.  Do not use any products that contain nicotine or tobacco, such as cigarettes and e-cigarettes. If you need help quitting, ask your health care provider.  Keep all follow-up visits as told by your health care provider. This is important.  Contact a health care provider if:  You have pain that gets worse and does not get better with medicine.  Your joint pain does not improve within 3 days.  You have increased bruising or swelling.  You have a fever.  You lose 10 lb (4.5 kg) or more without trying.    Get help right away if:  You cannot move the joint.  Your fingers or toes tingle, become numb, or turn cold and blue.  You have a fever along with a joint that is red, warm, and swollen.    Summary  Joint pain (arthralgia) may be caused by many things.  Your health care provider may recommend that you take pain medicine or wear a supportive device like an elastic bandage, sling, or splint.  If your joint pain continues, you may need tests to diagnose the cause of your joint pain.  Take over-the-counter and prescription medicines only as told by your health care provider.

## 2021-03-31 NOTE — ED ADULT TRIAGE NOTE - CHIEF COMPLAINT QUOTE
pt sent to ED by MD Brewster, c/o right toe pain and swelling. as per note " pt needs to be evaluated for possible osteomyelitis, recurrent infection, MRSA and antibiotic treatment"

## 2021-03-31 NOTE — ED ADULT NURSE NOTE - OBJECTIVE STATEMENT
Patient to ED for R 1st toe pain. Alert and oriented x3, unlabored breathing on room air, skin warm and dry to touch.  was seen at podiatrist and told to come to ED to rule out osteomyelitis.  has had toe pain x 4 days with worsening in the last 2 days, describes pain as sharp, tingling, and burning in nature, worsened with movement of affected toe.  took Advil last night with minimal relief. Reports being seen for same symptoms a month ago and was given PO antibiotics. Noted with swelling to R great toe, +redness, warmth and tenderness to palpation, no active drainage. Pedal pulses present. Denies recent injury. PMHx DM, compliant with insulin. Pending further evaluation.

## 2021-03-31 NOTE — ED PROVIDER NOTE - CLINICAL SUMMARY MEDICAL DECISION MAKING FREE TEXT BOX
70F PMH DM2, HTN, HLD, COPD p/w R 1st toe pain. Pt had ingrown nail removal during outpatient office visit on 2/9/21 and was prescribed Cefadroxil 500mg q12 for 1wk. Presented to [facility] ED ~6w ago w/ R 1st toe pain/swelling and was dc'd on bactrim w/ improvement. However, now has recurrence of R 1st toe pain and swelling x5d, slowly worsening. Went to podiatrist Dr. Thao today and referred to ED. No other systemic symptoms. Vitals wnl, exam as above. Very well appearing.  ddx: Concern for recurrent R toe infection, possible osteo.  Labs, XR, podiatry consulted, abx, symptom contrl, reassess. 70F PMH DM2, HTN, HLD, COPD p/w R 1st toe pain. Pt had ingrown nail removal during outpatient office visit on 2/9/21 and was prescribed Cefadroxil 500mg q12 for 1wk. Presented to [facility] ED ~6w ago w/ R 1st toe pain/swelling and was dc'd on bactrim w/ improvement. However, now has recurrence of R 1st toe pain and swelling x5d, slowly worsening. Went to podiatrist Dr. Thao today and referred to ED. No other systemic symptoms. Vitals wnl, exam as above. Very well appearing.  ddx: ?ingrown toenail vs. concern for ?recurrent R toe infection, possible osteo.  Labs, XR, podiatry consulted, abx, symptom control, reassess.

## 2021-03-31 NOTE — ED ADULT TRIAGE NOTE - ACCOMPANIED BY
Pt arrives with c/o lt pain and swelling, pain started Saturday, noticed swelling yesterday, Pt denies any trauma or travel.   Immediate family member

## 2021-03-31 NOTE — ED PROVIDER NOTE - PHYSICAL EXAMINATION
Mild R 1st toe swelling/warmth/erythema and ttp. Decreased ROM 2/2 pain. normal equal distal pulses. sensation intact. no foot swelling.  No crepitus, firmness, induration, fluctuance. No obvious skin breaks. Very mild swelling at lateral aspect of 1st toe adjacent to ?ingrown toe nail, localized ttp to that area. to warmth/erythema. Decreased ROM 2/2 pain. normal equal distal pulses. sensation intact. no foot swelling.  No crepitus, firmness, induration, fluctuance. No obvious skin breaks.

## 2021-03-31 NOTE — ED PROVIDER NOTE - WR ORDER STATUS 1
Spoke with patient's daughter, Dolly.  Patient had a fall today.  Complains of pain to coccyx area/lower back.  Daughter stated patient is experiencing some right sided facial drooping.  Although, her speech is not clear, she is able to form some words.  Another daughter stated facial drooping has been going on for several days.  Dolly is requesting a home visit to evaluate patient.  I informed her I did not know if house calls can be made, but will get more insight regarding this and call her back.  Patient has a history of compression fracture to lower back.  Please clarify and advise.  Thanks    Performed

## 2021-04-16 VITALS
DIASTOLIC BLOOD PRESSURE: 86 MMHG | HEIGHT: 60 IN | TEMPERATURE: 98 F | SYSTOLIC BLOOD PRESSURE: 169 MMHG | OXYGEN SATURATION: 98 % | HEART RATE: 82 BPM | RESPIRATION RATE: 18 BRPM

## 2021-04-16 LAB
ALBUMIN SERPL ELPH-MCNC: 4.2 G/DL — SIGNIFICANT CHANGE UP (ref 3.3–5)
ALP SERPL-CCNC: 98 U/L — SIGNIFICANT CHANGE UP (ref 40–120)
ALT FLD-CCNC: 18 U/L — SIGNIFICANT CHANGE UP (ref 10–45)
ANION GAP SERPL CALC-SCNC: 11 MMOL/L — SIGNIFICANT CHANGE UP (ref 5–17)
APTT BLD: 32.5 SEC — SIGNIFICANT CHANGE UP (ref 27.5–35.5)
AST SERPL-CCNC: 19 U/L — SIGNIFICANT CHANGE UP (ref 10–40)
BASOPHILS # BLD AUTO: 0.04 K/UL — SIGNIFICANT CHANGE UP (ref 0–0.2)
BASOPHILS NFR BLD AUTO: 0.3 % — SIGNIFICANT CHANGE UP (ref 0–2)
BILIRUB SERPL-MCNC: 0.4 MG/DL — SIGNIFICANT CHANGE UP (ref 0.2–1.2)
BUN SERPL-MCNC: 11 MG/DL — SIGNIFICANT CHANGE UP (ref 7–23)
CALCIUM SERPL-MCNC: 9.8 MG/DL — SIGNIFICANT CHANGE UP (ref 8.4–10.5)
CHLORIDE SERPL-SCNC: 104 MMOL/L — SIGNIFICANT CHANGE UP (ref 96–108)
CO2 SERPL-SCNC: 27 MMOL/L — SIGNIFICANT CHANGE UP (ref 22–31)
CREAT SERPL-MCNC: 0.59 MG/DL — SIGNIFICANT CHANGE UP (ref 0.5–1.3)
EOSINOPHIL # BLD AUTO: 0.19 K/UL — SIGNIFICANT CHANGE UP (ref 0–0.5)
EOSINOPHIL NFR BLD AUTO: 1.5 % — SIGNIFICANT CHANGE UP (ref 0–6)
GLUCOSE SERPL-MCNC: 130 MG/DL — HIGH (ref 70–99)
HCT VFR BLD CALC: 37.4 % — SIGNIFICANT CHANGE UP (ref 34.5–45)
HGB BLD-MCNC: 12.7 G/DL — SIGNIFICANT CHANGE UP (ref 11.5–15.5)
IMM GRANULOCYTES NFR BLD AUTO: 0.4 % — SIGNIFICANT CHANGE UP (ref 0–1.5)
INR BLD: 0.9 — SIGNIFICANT CHANGE UP (ref 0.88–1.16)
LACTATE SERPL-SCNC: 1.3 MMOL/L — SIGNIFICANT CHANGE UP (ref 0.5–2)
LYMPHOCYTES # BLD AUTO: 2.82 K/UL — SIGNIFICANT CHANGE UP (ref 1–3.3)
LYMPHOCYTES # BLD AUTO: 22.4 % — SIGNIFICANT CHANGE UP (ref 13–44)
MCHC RBC-ENTMCNC: 31 PG — SIGNIFICANT CHANGE UP (ref 27–34)
MCHC RBC-ENTMCNC: 34 GM/DL — SIGNIFICANT CHANGE UP (ref 32–36)
MCV RBC AUTO: 91.2 FL — SIGNIFICANT CHANGE UP (ref 80–100)
MONOCYTES # BLD AUTO: 0.82 K/UL — SIGNIFICANT CHANGE UP (ref 0–0.9)
MONOCYTES NFR BLD AUTO: 6.5 % — SIGNIFICANT CHANGE UP (ref 2–14)
NEUTROPHILS # BLD AUTO: 8.65 K/UL — HIGH (ref 1.8–7.4)
NEUTROPHILS NFR BLD AUTO: 68.9 % — SIGNIFICANT CHANGE UP (ref 43–77)
NRBC # BLD: 0 /100 WBCS — SIGNIFICANT CHANGE UP (ref 0–0)
PLATELET # BLD AUTO: 235 K/UL — SIGNIFICANT CHANGE UP (ref 150–400)
POTASSIUM SERPL-MCNC: 3.8 MMOL/L — SIGNIFICANT CHANGE UP (ref 3.5–5.3)
POTASSIUM SERPL-SCNC: 3.8 MMOL/L — SIGNIFICANT CHANGE UP (ref 3.5–5.3)
PROT SERPL-MCNC: 7 G/DL — SIGNIFICANT CHANGE UP (ref 6–8.3)
PROTHROM AB SERPL-ACNC: 10.8 SEC — SIGNIFICANT CHANGE UP (ref 10.6–13.6)
RBC # BLD: 4.1 M/UL — SIGNIFICANT CHANGE UP (ref 3.8–5.2)
RBC # FLD: 12.6 % — SIGNIFICANT CHANGE UP (ref 10.3–14.5)
SODIUM SERPL-SCNC: 142 MMOL/L — SIGNIFICANT CHANGE UP (ref 135–145)
WBC # BLD: 12.57 K/UL — HIGH (ref 3.8–10.5)
WBC # FLD AUTO: 12.57 K/UL — HIGH (ref 3.8–10.5)

## 2021-04-16 PROCEDURE — 99285 EMERGENCY DEPT VISIT HI MDM: CPT | Mod: 25

## 2021-04-16 PROCEDURE — 93010 ELECTROCARDIOGRAM REPORT: CPT

## 2021-04-16 RX ORDER — OXYCODONE AND ACETAMINOPHEN 5; 325 MG/1; MG/1
1 TABLET ORAL ONCE
Refills: 0 | Status: DISCONTINUED | OUTPATIENT
Start: 2021-04-16 | End: 2021-04-16

## 2021-04-16 RX ORDER — VANCOMYCIN HCL 1 G
1000 VIAL (EA) INTRAVENOUS ONCE
Refills: 0 | Status: COMPLETED | OUTPATIENT
Start: 2021-04-16 | End: 2021-04-16

## 2021-04-16 RX ADMIN — OXYCODONE AND ACETAMINOPHEN 1 TABLET(S): 5; 325 TABLET ORAL at 23:37

## 2021-04-16 RX ADMIN — Medication 250 MILLIGRAM(S): at 23:38

## 2021-04-16 NOTE — ED ADULT NURSE NOTE - CHPI ED NUR SYMPTOMS NEG
no abrasion/no back pain/no bruising/no deformity/no fever/no numbness/no stiffness/no tingling/no weakness Yes

## 2021-04-16 NOTE — ED ADULT NURSE NOTE - OBJECTIVE STATEMENT
Pt coming due to right great toe swelling, redness, oozing (clear fluid) and pain. Pt states she had nail removed from area on march 8th and since then symptoms developed. Pt was diagnosed with osteomyelitis. pt was placed on abxs which she finished last Tuesday. Pain became worst after abxs were finished. Pt unable to bear weight to the area. Pt denies any fevers.

## 2021-04-16 NOTE — ED ADULT TRIAGE NOTE - ARRIVAL INFO ADDITIONAL COMMENTS
pt had ingrown toe removal 2/9/21 with admission to hospital 2 weeks ago for persistent pain and ostoe (but no superficail infection),  discharged and 4/8 had another ingrown nail removed from toe and now has severe pain.

## 2021-04-17 ENCOUNTER — INPATIENT (INPATIENT)
Facility: HOSPITAL | Age: 71
LOS: 3 days | Discharge: HOME HEALTH | DRG: 988 | End: 2021-04-21
Attending: STUDENT IN AN ORGANIZED HEALTH CARE EDUCATION/TRAINING PROGRAM | Admitting: HOSPITALIST
Payer: MEDICARE

## 2021-04-17 DIAGNOSIS — R63.8 OTHER SYMPTOMS AND SIGNS CONCERNING FOOD AND FLUID INTAKE: ICD-10-CM

## 2021-04-17 DIAGNOSIS — I10 ESSENTIAL (PRIMARY) HYPERTENSION: ICD-10-CM

## 2021-04-17 DIAGNOSIS — J44.9 CHRONIC OBSTRUCTIVE PULMONARY DISEASE, UNSPECIFIED: ICD-10-CM

## 2021-04-17 DIAGNOSIS — E11.9 TYPE 2 DIABETES MELLITUS WITHOUT COMPLICATIONS: ICD-10-CM

## 2021-04-17 DIAGNOSIS — Z98.890 OTHER SPECIFIED POSTPROCEDURAL STATES: Chronic | ICD-10-CM

## 2021-04-17 DIAGNOSIS — L03.031 CELLULITIS OF RIGHT TOE: ICD-10-CM

## 2021-04-17 LAB
A1C WITH ESTIMATED AVERAGE GLUCOSE RESULT: 7.1 % — HIGH (ref 4–5.6)
ANION GAP SERPL CALC-SCNC: 9 MMOL/L — SIGNIFICANT CHANGE UP (ref 5–17)
BASOPHILS # BLD AUTO: 0.04 K/UL — SIGNIFICANT CHANGE UP (ref 0–0.2)
BASOPHILS NFR BLD AUTO: 0.4 % — SIGNIFICANT CHANGE UP (ref 0–2)
BUN SERPL-MCNC: 9 MG/DL — SIGNIFICANT CHANGE UP (ref 7–23)
CALCIUM SERPL-MCNC: 9.1 MG/DL — SIGNIFICANT CHANGE UP (ref 8.4–10.5)
CHLORIDE SERPL-SCNC: 104 MMOL/L — SIGNIFICANT CHANGE UP (ref 96–108)
CO2 SERPL-SCNC: 28 MMOL/L — SIGNIFICANT CHANGE UP (ref 22–31)
CREAT SERPL-MCNC: 0.71 MG/DL — SIGNIFICANT CHANGE UP (ref 0.5–1.3)
EOSINOPHIL # BLD AUTO: 0.22 K/UL — SIGNIFICANT CHANGE UP (ref 0–0.5)
EOSINOPHIL NFR BLD AUTO: 2 % — SIGNIFICANT CHANGE UP (ref 0–6)
ESTIMATED AVERAGE GLUCOSE: 157 MG/DL — HIGH (ref 68–114)
GLUCOSE BLDC GLUCOMTR-MCNC: 102 MG/DL — HIGH (ref 70–99)
GLUCOSE BLDC GLUCOMTR-MCNC: 135 MG/DL — HIGH (ref 70–99)
GLUCOSE BLDC GLUCOMTR-MCNC: 152 MG/DL — HIGH (ref 70–99)
GLUCOSE BLDC GLUCOMTR-MCNC: 180 MG/DL — HIGH (ref 70–99)
GLUCOSE BLDC GLUCOMTR-MCNC: 53 MG/DL — CRITICAL LOW (ref 70–99)
GLUCOSE SERPL-MCNC: 62 MG/DL — LOW (ref 70–99)
HCT VFR BLD CALC: 37.2 % — SIGNIFICANT CHANGE UP (ref 34.5–45)
HGB BLD-MCNC: 12.3 G/DL — SIGNIFICANT CHANGE UP (ref 11.5–15.5)
IMM GRANULOCYTES NFR BLD AUTO: 0.4 % — SIGNIFICANT CHANGE UP (ref 0–1.5)
LYMPHOCYTES # BLD AUTO: 2.69 K/UL — SIGNIFICANT CHANGE UP (ref 1–3.3)
LYMPHOCYTES # BLD AUTO: 24.8 % — SIGNIFICANT CHANGE UP (ref 13–44)
MAGNESIUM SERPL-MCNC: 2 MG/DL — SIGNIFICANT CHANGE UP (ref 1.6–2.6)
MCHC RBC-ENTMCNC: 30.8 PG — SIGNIFICANT CHANGE UP (ref 27–34)
MCHC RBC-ENTMCNC: 33.1 GM/DL — SIGNIFICANT CHANGE UP (ref 32–36)
MCV RBC AUTO: 93.2 FL — SIGNIFICANT CHANGE UP (ref 80–100)
MONOCYTES # BLD AUTO: 0.81 K/UL — SIGNIFICANT CHANGE UP (ref 0–0.9)
MONOCYTES NFR BLD AUTO: 7.5 % — SIGNIFICANT CHANGE UP (ref 2–14)
NEUTROPHILS # BLD AUTO: 7.06 K/UL — SIGNIFICANT CHANGE UP (ref 1.8–7.4)
NEUTROPHILS NFR BLD AUTO: 64.9 % — SIGNIFICANT CHANGE UP (ref 43–77)
NRBC # BLD: 0 /100 WBCS — SIGNIFICANT CHANGE UP (ref 0–0)
PHOSPHATE SERPL-MCNC: 4 MG/DL — SIGNIFICANT CHANGE UP (ref 2.5–4.5)
PLATELET # BLD AUTO: 244 K/UL — SIGNIFICANT CHANGE UP (ref 150–400)
POTASSIUM SERPL-MCNC: 3.2 MMOL/L — LOW (ref 3.5–5.3)
POTASSIUM SERPL-SCNC: 3.2 MMOL/L — LOW (ref 3.5–5.3)
RBC # BLD: 3.99 M/UL — SIGNIFICANT CHANGE UP (ref 3.8–5.2)
RBC # FLD: 12.7 % — SIGNIFICANT CHANGE UP (ref 10.3–14.5)
SARS-COV-2 RNA SPEC QL NAA+PROBE: SIGNIFICANT CHANGE UP
SODIUM SERPL-SCNC: 141 MMOL/L — SIGNIFICANT CHANGE UP (ref 135–145)
WBC # BLD: 10.86 K/UL — HIGH (ref 3.8–10.5)
WBC # FLD AUTO: 10.86 K/UL — HIGH (ref 3.8–10.5)

## 2021-04-17 PROCEDURE — 99222 1ST HOSP IP/OBS MODERATE 55: CPT

## 2021-04-17 PROCEDURE — 73720 MRI LWR EXTREMITY W/O&W/DYE: CPT | Mod: 26,RT

## 2021-04-17 PROCEDURE — 73660 X-RAY EXAM OF TOE(S): CPT | Mod: 26,RT

## 2021-04-17 PROCEDURE — 99223 1ST HOSP IP/OBS HIGH 75: CPT | Mod: GC

## 2021-04-17 RX ORDER — GLUCAGON INJECTION, SOLUTION 0.5 MG/.1ML
1 INJECTION, SOLUTION SUBCUTANEOUS ONCE
Refills: 0 | Status: DISCONTINUED | OUTPATIENT
Start: 2021-04-17 | End: 2021-04-21

## 2021-04-17 RX ORDER — NICOTINE POLACRILEX 2 MG
1 GUM BUCCAL DAILY
Refills: 0 | Status: DISCONTINUED | OUTPATIENT
Start: 2021-04-17 | End: 2021-04-21

## 2021-04-17 RX ORDER — OXYCODONE HYDROCHLORIDE 5 MG/1
5 TABLET ORAL EVERY 6 HOURS
Refills: 0 | Status: DISCONTINUED | OUTPATIENT
Start: 2021-04-17 | End: 2021-04-21

## 2021-04-17 RX ORDER — PIPERACILLIN AND TAZOBACTAM 4; .5 G/20ML; G/20ML
4.5 INJECTION, POWDER, LYOPHILIZED, FOR SOLUTION INTRAVENOUS EVERY 6 HOURS
Refills: 0 | Status: DISCONTINUED | OUTPATIENT
Start: 2021-04-17 | End: 2021-04-17

## 2021-04-17 RX ORDER — POTASSIUM CHLORIDE 20 MEQ
20 PACKET (EA) ORAL
Refills: 0 | Status: COMPLETED | OUTPATIENT
Start: 2021-04-17 | End: 2021-04-17

## 2021-04-17 RX ORDER — BUDESONIDE AND FORMOTEROL FUMARATE DIHYDRATE 160; 4.5 UG/1; UG/1
2 AEROSOL RESPIRATORY (INHALATION)
Refills: 0 | Status: DISCONTINUED | OUTPATIENT
Start: 2021-04-17 | End: 2021-04-21

## 2021-04-17 RX ORDER — VANCOMYCIN HCL 1 G
1000 VIAL (EA) INTRAVENOUS EVERY 12 HOURS
Refills: 0 | Status: DISCONTINUED | OUTPATIENT
Start: 2021-04-17 | End: 2021-04-17

## 2021-04-17 RX ORDER — INSULIN GLARGINE 100 [IU]/ML
24 INJECTION, SOLUTION SUBCUTANEOUS ONCE
Refills: 0 | Status: COMPLETED | OUTPATIENT
Start: 2021-04-17 | End: 2021-04-17

## 2021-04-17 RX ORDER — DEXTROSE 50 % IN WATER 50 %
25 SYRINGE (ML) INTRAVENOUS ONCE
Refills: 0 | Status: DISCONTINUED | OUTPATIENT
Start: 2021-04-17 | End: 2021-04-21

## 2021-04-17 RX ORDER — VALSARTAN 80 MG/1
80 TABLET ORAL DAILY
Refills: 0 | Status: DISCONTINUED | OUTPATIENT
Start: 2021-04-17 | End: 2021-04-21

## 2021-04-17 RX ORDER — MORPHINE SULFATE 50 MG/1
1 CAPSULE, EXTENDED RELEASE ORAL EVERY 6 HOURS
Refills: 0 | Status: DISCONTINUED | OUTPATIENT
Start: 2021-04-17 | End: 2021-04-20

## 2021-04-17 RX ORDER — INSULIN GLARGINE 100 [IU]/ML
20 INJECTION, SOLUTION SUBCUTANEOUS AT BEDTIME
Refills: 0 | Status: DISCONTINUED | OUTPATIENT
Start: 2021-04-17 | End: 2021-04-20

## 2021-04-17 RX ORDER — LISINOPRIL 2.5 MG/1
1 TABLET ORAL
Qty: 0 | Refills: 0 | DISCHARGE

## 2021-04-17 RX ORDER — INSULIN GLARGINE 100 [IU]/ML
24 INJECTION, SOLUTION SUBCUTANEOUS AT BEDTIME
Refills: 0 | Status: DISCONTINUED | OUTPATIENT
Start: 2021-04-17 | End: 2021-04-17

## 2021-04-17 RX ORDER — SODIUM CHLORIDE 9 MG/ML
1000 INJECTION, SOLUTION INTRAVENOUS
Refills: 0 | Status: DISCONTINUED | OUTPATIENT
Start: 2021-04-17 | End: 2021-04-21

## 2021-04-17 RX ORDER — ACETAMINOPHEN 500 MG
650 TABLET ORAL EVERY 6 HOURS
Refills: 0 | Status: DISCONTINUED | OUTPATIENT
Start: 2021-04-17 | End: 2021-04-21

## 2021-04-17 RX ORDER — ATORVASTATIN CALCIUM 80 MG/1
30 TABLET, FILM COATED ORAL
Qty: 0 | Refills: 0 | DISCHARGE

## 2021-04-17 RX ORDER — ACETAMINOPHEN 500 MG
650 TABLET ORAL EVERY 6 HOURS
Refills: 0 | Status: DISCONTINUED | OUTPATIENT
Start: 2021-04-17 | End: 2021-04-17

## 2021-04-17 RX ORDER — INSULIN GLARGINE 100 [IU]/ML
25 INJECTION, SOLUTION SUBCUTANEOUS
Qty: 0 | Refills: 0 | DISCHARGE

## 2021-04-17 RX ORDER — INSULIN LISPRO 100/ML
VIAL (ML) SUBCUTANEOUS
Refills: 0 | Status: DISCONTINUED | OUTPATIENT
Start: 2021-04-17 | End: 2021-04-21

## 2021-04-17 RX ORDER — TIOTROPIUM BROMIDE AND OLODATEROL 3.124; 2.736 UG/1; UG/1
2 SPRAY, METERED RESPIRATORY (INHALATION) DAILY
Refills: 0 | Status: DISCONTINUED | OUTPATIENT
Start: 2021-04-17 | End: 2021-04-17

## 2021-04-17 RX ORDER — ATORVASTATIN CALCIUM 80 MG/1
40 TABLET, FILM COATED ORAL AT BEDTIME
Refills: 0 | Status: DISCONTINUED | OUTPATIENT
Start: 2021-04-17 | End: 2021-04-21

## 2021-04-17 RX ORDER — ENOXAPARIN SODIUM 100 MG/ML
40 INJECTION SUBCUTANEOUS EVERY 24 HOURS
Refills: 0 | Status: DISCONTINUED | OUTPATIENT
Start: 2021-04-17 | End: 2021-04-21

## 2021-04-17 RX ORDER — DEXTROSE 50 % IN WATER 50 %
15 SYRINGE (ML) INTRAVENOUS ONCE
Refills: 0 | Status: DISCONTINUED | OUTPATIENT
Start: 2021-04-17 | End: 2021-04-21

## 2021-04-17 RX ORDER — OXYCODONE HYDROCHLORIDE 5 MG/1
5 TABLET ORAL EVERY 6 HOURS
Refills: 0 | Status: DISCONTINUED | OUTPATIENT
Start: 2021-04-17 | End: 2021-04-17

## 2021-04-17 RX ORDER — TIOTROPIUM BROMIDE 18 UG/1
1 CAPSULE ORAL; RESPIRATORY (INHALATION) DAILY
Refills: 0 | Status: DISCONTINUED | OUTPATIENT
Start: 2021-04-17 | End: 2021-04-21

## 2021-04-17 RX ORDER — DEXTROSE 50 % IN WATER 50 %
12.5 SYRINGE (ML) INTRAVENOUS ONCE
Refills: 0 | Status: DISCONTINUED | OUTPATIENT
Start: 2021-04-17 | End: 2021-04-21

## 2021-04-17 RX ADMIN — Medication 20 MILLIEQUIVALENT(S): at 13:54

## 2021-04-17 RX ADMIN — Medication 650 MILLIGRAM(S): at 15:12

## 2021-04-17 RX ADMIN — TIOTROPIUM BROMIDE 1 CAPSULE(S): 18 CAPSULE ORAL; RESPIRATORY (INHALATION) at 10:42

## 2021-04-17 RX ADMIN — ATORVASTATIN CALCIUM 40 MILLIGRAM(S): 80 TABLET, FILM COATED ORAL at 22:09

## 2021-04-17 RX ADMIN — BUDESONIDE AND FORMOTEROL FUMARATE DIHYDRATE 2 PUFF(S): 160; 4.5 AEROSOL RESPIRATORY (INHALATION) at 10:41

## 2021-04-17 RX ADMIN — OXYCODONE HYDROCHLORIDE 5 MILLIGRAM(S): 5 TABLET ORAL at 02:48

## 2021-04-17 RX ADMIN — OXYCODONE HYDROCHLORIDE 5 MILLIGRAM(S): 5 TABLET ORAL at 23:09

## 2021-04-17 RX ADMIN — ENOXAPARIN SODIUM 40 MILLIGRAM(S): 100 INJECTION SUBCUTANEOUS at 06:39

## 2021-04-17 RX ADMIN — BUDESONIDE AND FORMOTEROL FUMARATE DIHYDRATE 2 PUFF(S): 160; 4.5 AEROSOL RESPIRATORY (INHALATION) at 20:43

## 2021-04-17 RX ADMIN — OXYCODONE HYDROCHLORIDE 5 MILLIGRAM(S): 5 TABLET ORAL at 22:09

## 2021-04-17 RX ADMIN — Medication 1 PATCH: at 16:17

## 2021-04-17 RX ADMIN — Medication 20 MILLIEQUIVALENT(S): at 09:29

## 2021-04-17 RX ADMIN — Medication 1 PATCH: at 11:12

## 2021-04-17 RX ADMIN — Medication 2: at 17:21

## 2021-04-17 RX ADMIN — INSULIN GLARGINE 24 UNIT(S): 100 INJECTION, SOLUTION SUBCUTANEOUS at 02:45

## 2021-04-17 RX ADMIN — MORPHINE SULFATE 1 MILLIGRAM(S): 50 CAPSULE, EXTENDED RELEASE ORAL at 20:42

## 2021-04-17 RX ADMIN — Medication 650 MILLIGRAM(S): at 14:12

## 2021-04-17 RX ADMIN — MORPHINE SULFATE 1 MILLIGRAM(S): 50 CAPSULE, EXTENDED RELEASE ORAL at 15:06

## 2021-04-17 RX ADMIN — MORPHINE SULFATE 1 MILLIGRAM(S): 50 CAPSULE, EXTENDED RELEASE ORAL at 20:58

## 2021-04-17 RX ADMIN — PIPERACILLIN AND TAZOBACTAM 200 GRAM(S): 4; .5 INJECTION, POWDER, LYOPHILIZED, FOR SOLUTION INTRAVENOUS at 09:29

## 2021-04-17 RX ADMIN — Medication 20 MILLIEQUIVALENT(S): at 11:13

## 2021-04-17 RX ADMIN — OXYCODONE HYDROCHLORIDE 5 MILLIGRAM(S): 5 TABLET ORAL at 11:49

## 2021-04-17 RX ADMIN — OXYCODONE HYDROCHLORIDE 5 MILLIGRAM(S): 5 TABLET ORAL at 10:49

## 2021-04-17 RX ADMIN — VALSARTAN 80 MILLIGRAM(S): 80 TABLET ORAL at 09:29

## 2021-04-17 RX ADMIN — PIPERACILLIN AND TAZOBACTAM 200 GRAM(S): 4; .5 INJECTION, POWDER, LYOPHILIZED, FOR SOLUTION INTRAVENOUS at 03:15

## 2021-04-17 RX ADMIN — INSULIN GLARGINE 20 UNIT(S): 100 INJECTION, SOLUTION SUBCUTANEOUS at 22:48

## 2021-04-17 RX ADMIN — Medication 250 MILLIGRAM(S): at 11:13

## 2021-04-17 RX ADMIN — MORPHINE SULFATE 1 MILLIGRAM(S): 50 CAPSULE, EXTENDED RELEASE ORAL at 15:35

## 2021-04-17 NOTE — ED PROVIDER NOTE - CLINICAL SUMMARY MEDICAL DECISION MAKING FREE TEXT BOX
worsening right great toe pain, swelling, redness, concern for osteo and failed outpt po ABX  -check labs  -xray  -vanco  -podiatry consult

## 2021-04-17 NOTE — H&P ADULT - NSHPLABSRESULTS_GEN_ALL_CORE
LABS:                         12.7   12.57 )-----------( 235      ( 16 Apr 2021 23:33 )             37.4     04-16    142  |  104  |  11  ----------------------------<  130<H>  3.8   |  27  |  0.59    Ca    9.8      16 Apr 2021 23:33    TPro  7.0  /  Alb  4.2  /  TBili  0.4  /  DBili  x   /  AST  19  /  ALT  18  /  AlkPhos  98  04-16    PT/INR - ( 16 Apr 2021 23:33 )   PT: 10.8 sec;   INR: 0.90          PTT - ( 16 Apr 2021 23:33 )  PTT:32.5 sec          Lactate, Blood: 1.3 mmol/L (04-16 @ 23:33)      RADIOLOGY, EKG & ADDITIONAL TESTS:   Reviewed

## 2021-04-17 NOTE — CONSULT NOTE ADULT - SUBJECTIVE AND OBJECTIVE BOX
Attending:    Patient is a 70y old  Female who presents with a chief complaint of R great toe pain    HPI: 71yo F with PMHx DM2 (reports last A1c of 7%), HTN, HLD, COPD, s/p back surgery (Nov 2020) presents to [facility] ED for right great toe pain. Pt underwent ingrown nail removal (both great toe nail borders) during outpatient office visit on 2/9/21 and was prescribed Cefadroxil 500mg q12 for 1wk. Pt presented to ED on 2/17/21 and was prescribed Bactrim 1 DS tablet q12 for 1 wk. Pt presented again on 3/31 for recurrent R great toe pain, XR revealed chronic OM w/ no acute signs of infection.     Review of systems negative except per HPI    PAST MEDICAL & SURGICAL HISTORY:  Asthma    Diabetes    High cholesterol    Hypertension    H/O laminectomy  1990, 1991      Home Medications:  atorvastatin: 30 milligram(s) orally once a day (at bedtime) (17 Feb 2021 14:26)  Basaglar KwikPen 100 units/mL subcutaneous solution: 25 unit(s) subcutaneous once a day (at bedtime) (17 Feb 2021 14:26)  Incruse Ellipta 62.5 mcg/inh inhalation powder: 1 puff(s) inhaled once a day (17 Feb 2021 14:26)  lisinopril 20 mg oral tablet: 1 tab(s) orally once a day (17 Feb 2021 14:26)  raNITIdine 300 mg oral tablet: 1 tab(s) orally once a day (at bedtime) (05 Jan 2020 12:59)  Symbicort 160 mcg-4.5 mcg/inh inhalation aerosol: 2 puff(s) inhaled 2 times a day (05 Jan 2020 12:59)    Allergies    No Known Allergies    Intolerances      FAMILY HISTORY:  FH: diabetes mellitus (Father, Mother)  mother and father      Social History:       LABS                        12.7   12.57 )-----------( 235      ( 16 Apr 2021 23:33 )             37.4     04-16    142  |  104  |  11  ----------------------------<  130<H>  3.8   |  27  |  0.59    Ca    9.8      16 Apr 2021 23:33    TPro  7.0  /  Alb  4.2  /  TBili  0.4  /  DBili  x   /  AST  19  /  ALT  18  /  AlkPhos  98  04-16    PT/INR - ( 16 Apr 2021 23:33 )   PT: 10.8 sec;   INR: 0.90          PTT - ( 16 Apr 2021 23:33 )  PTT:32.5 sec    Vital Signs Last 24 Hrs  T(C): 36.8 (16 Apr 2021 22:11), Max: 36.8 (16 Apr 2021 22:11)  T(F): 98.3 (16 Apr 2021 22:11), Max: 98.3 (16 Apr 2021 22:11)  HR: 82 (16 Apr 2021 22:11) (82 - 82)  BP: 169/86 (16 Apr 2021 22:11) (169/86 - 169/86)  BP(mean): --  RR: 18 (16 Apr 2021 22:11) (18 - 18)  SpO2: 98% (16 Apr 2021 22:11) (98% - 98%)    PHYSICAL EXAM  General: NAD, AA0x3    Lower Extremity Focused:  Vasc:  Derm:  Neuro:  MSK:     RADIOLOGY                       Attending:    Patient is a 70y old  Female who presents with a chief complaint of R great toe pain    HPI: 71yo F with PMHx DM2 (reports last A1c of 7%), HTN, HLD, COPD, s/p back surgery (Nov 2020) presents to [facility] ED for right great toe pain. Pt underwent ingrown nail removal (both great toe nail borders) during outpatient office visit on 2/9/21 and was prescribed Cefadroxil 500mg q12 for 1wk. Pt presented to ED on 2/17/21 and was prescribed Bactrim 1 DS tablet q12 for 1 wk. Pt presented to ED again on 3/31 for recurrent R great toe pain, XR revealed chronic OM w/ no acute signs of infection. Pt saw podiatrist Dr. Zayas on 4/1/21; during that outpatient visit the entire great toe nail was removed and pt was prescribed Bactrim for 1wk, pt endorses compliance w/ oral abx. Pt states that following removal of the entire great toe nail, the appearance of her right great toe worsened. She grew more concerned and came to ED again tonight because she noticed clear watery like drainage from the medial aspect of great toe, states that it was not a thick pus, no bleeding or malodor. Pt endorses extreme pain to R great toe even to light touch and has had increased difficulty ambulating. Pt states that OTC pain meds have provided no relief. She has also applied Vicks VapoRub to the skin but not the nail area. Pt denies F/N/V/C/CP/SOB.     Review of systems negative except per HPI    PAST MEDICAL & SURGICAL HISTORY:  Asthma    Diabetes    High cholesterol    Hypertension    H/O laminectomy  1990, 1991      Home Medications:  atorvastatin: 30 milligram(s) orally once a day (at bedtime) (17 Feb 2021 14:26)  Basaglar KwikPen 100 units/mL subcutaneous solution: 25 unit(s) subcutaneous once a day (at bedtime) (17 Feb 2021 14:26)  Incruse Ellipta 62.5 mcg/inh inhalation powder: 1 puff(s) inhaled once a day (17 Feb 2021 14:26)  lisinopril 20 mg oral tablet: 1 tab(s) orally once a day (17 Feb 2021 14:26)  raNITIdine 300 mg oral tablet: 1 tab(s) orally once a day (at bedtime) (05 Jan 2020 12:59)  Symbicort 160 mcg-4.5 mcg/inh inhalation aerosol: 2 puff(s) inhaled 2 times a day (05 Jan 2020 12:59)    Allergies    No Known Allergies    Intolerances      FAMILY HISTORY:  FH: diabetes mellitus (Father, Mother)  mother and father      Social History: Lives at home w/ .       LABS                        12.7   12.57 )-----------( 235      ( 16 Apr 2021 23:33 )             37.4     04-16    142  |  104  |  11  ----------------------------<  130<H>  3.8   |  27  |  0.59    Ca    9.8      16 Apr 2021 23:33    TPro  7.0  /  Alb  4.2  /  TBili  0.4  /  DBili  x   /  AST  19  /  ALT  18  /  AlkPhos  98  04-16    PT/INR - ( 16 Apr 2021 23:33 )   PT: 10.8 sec;   INR: 0.90          PTT - ( 16 Apr 2021 23:33 )  PTT:32.5 sec    Vital Signs Last 24 Hrs  T(C): 36.8 (16 Apr 2021 22:11), Max: 36.8 (16 Apr 2021 22:11)  T(F): 98.3 (16 Apr 2021 22:11), Max: 98.3 (16 Apr 2021 22:11)  HR: 82 (16 Apr 2021 22:11) (82 - 82)  BP: 169/86 (16 Apr 2021 22:11) (169/86 - 169/86)  BP(mean): --  RR: 18 (16 Apr 2021 22:11) (18 - 18)  SpO2: 98% (16 Apr 2021 22:11) (98% - 98%)    PHYSICAL EXAM  General: NAD, AA0x3    Lower Extremity Focused:  Vasc: DP/PT 2/4 b/l. CFT brisk x 10. Increased warmth to R great toe. Severe edema to R great toe.   Derm: Erythema and ecchymosis to R great toe.   Neuro: Sensation intact b/l  MSK: Pain w/ light touch to any aspect of the R great toe. Pt able to mobilize other digits of R foot but R great toe is stiff, unable to DF/PF 2/2 pain.    RADIOLOGY

## 2021-04-17 NOTE — H&P ADULT - NSHPPHYSICALEXAM_GEN_ALL_CORE
Vital Signs Last 12 Hrs  T(F): 98.3 (04-16-21 @ 22:11), Max: 98.3 (04-16-21 @ 22:11)  HR: 82 (04-16-21 @ 22:11) (82 - 82)  BP: 169/86 (04-16-21 @ 22:11) (169/86 - 169/86)  BP(mean): --  RR: 18 (04-16-21 @ 22:11) (18 - 18)  SpO2: 98% (04-16-21 @ 22:11) (98% - 98%)    PHYSICAL EXAM:  Constitutional: well appearing, NAD, comfortable in bed.  HEENT: NC/AT, PERRLA, EOMI, no conjunctival pallor or scleral icterus, MMM  Neck: Supple, no JVD  Respiratory: CTA B/L. No w/r/r.   Cardiovascular: RRR, normal S1 and S2, no m/r/g.   Gastrointestinal: +BS, soft NTND, no guarding or rebound tenderness, no palpable masses   Extremities: R great toe erythema and pain to touch, no drainage, wwp; no cyanosis, clubbing or edema.   Vascular: Pulses equal and strong throughout.   Neurological: AAOx3, no CN deficits, strength and sensation intact throughout.   Skin: Toe as above

## 2021-04-17 NOTE — H&P ADULT - ATTENDING COMMENTS
70 year old woman with right toe pain - MRI showing fluid collection, osteomyelitis.   HOlding abx per ID recs   Needs source control  possible bone biopsy with podiatry

## 2021-04-17 NOTE — PROGRESS NOTE ADULT - ASSESSMENT
71yo F with PMHx DM2 (reports last A1c of 7%), HTN, HLD, COPD, s/p back surgery (Nov 2020) presents to [facility] ED for recurrent right great toe pain. Pt has failed PO abx three times. Podiatry consulted for mgmt of right great toe cellulitis w/ possible underlying osteomyelitis of distal phalanx.     P:   Continue IV abx   MRI showing OM of distal phalanx of R great toe; will discuss obtaining bone biopsy w/ pt tomorrow  Pt will likely need PICC line for long-term abx course for trt of OM; please consult ID  WBAT to right heel in surgical shoe  Podiatry following

## 2021-04-17 NOTE — H&P ADULT - ASSESSMENT
70 F with hx of HTN, DM (reports well controlled), HLD who presents for R great toe pain persistent despite outpatient abx concern for cellulitis with possible underlying oseto

## 2021-04-17 NOTE — PROGRESS NOTE ADULT - SUBJECTIVE AND OBJECTIVE BOX
Patient is a 70y old  Female who presents with a chief complaint of     INTERVAL HPI/ OVERNIGHT EVENTS      LABS                        12.3   10.86 )-----------( 244      ( 17 Apr 2021 06:37 )             37.2     04-17    141  |  104  |  9   ----------------------------<  62<L>  3.2<L>   |  28  |  0.71    Ca    9.1      17 Apr 2021 06:37  Phos  4.0     04-17  Mg     2.0     04-17    TPro  7.0  /  Alb  4.2  /  TBili  0.4  /  DBili  x   /  AST  19  /  ALT  18  /  AlkPhos  98  04-16    PT/INR - ( 16 Apr 2021 23:33 )   PT: 10.8 sec;   INR: 0.90          PTT - ( 16 Apr 2021 23:33 )  PTT:32.5 sec  ESR: 8  CRP: --  04-16 @ 23:33    ICU Vital Signs Last 24 Hrs  T(C): 36.5 (17 Apr 2021 06:51), Max: 36.8 (16 Apr 2021 22:11)  T(F): 97.7 (17 Apr 2021 06:51), Max: 98.3 (16 Apr 2021 22:11)  HR: 63 (17 Apr 2021 06:51) (60 - 82)  BP: 145/74 (17 Apr 2021 06:51) (131/65 - 169/86)  BP(mean): --  ABP: --  ABP(mean): --  RR: 18 (17 Apr 2021 06:51) (18 - 18)  SpO2: 98% (17 Apr 2021 06:51) (97% - 98%)      RADIOLOGY    MICROBIOLOGY    PHYSICAL EXAM  Lower Extremity Focused  Vasc:  Derm:  Neuro:  MSK: Patient is a 70y old  Female who presents with a chief complaint of     INTERVAL HPI/ OVERNIGHT EVENTS: Pt admitted to medicine o/n. Endorses severe pain to R great toe.       LABS                        12.3   10.86 )-----------( 244      ( 17 Apr 2021 06:37 )             37.2     04-17    141  |  104  |  9   ----------------------------<  62<L>  3.2<L>   |  28  |  0.71    Ca    9.1      17 Apr 2021 06:37  Phos  4.0     04-17  Mg     2.0     04-17    TPro  7.0  /  Alb  4.2  /  TBili  0.4  /  DBili  x   /  AST  19  /  ALT  18  /  AlkPhos  98  04-16    PT/INR - ( 16 Apr 2021 23:33 )   PT: 10.8 sec;   INR: 0.90          PTT - ( 16 Apr 2021 23:33 )  PTT:32.5 sec  ESR: 8  CRP: --  04-16 @ 23:33    ICU Vital Signs Last 24 Hrs  T(C): 36.5 (17 Apr 2021 06:51), Max: 36.8 (16 Apr 2021 22:11)  T(F): 97.7 (17 Apr 2021 06:51), Max: 98.3 (16 Apr 2021 22:11)  HR: 63 (17 Apr 2021 06:51) (60 - 82)  BP: 145/74 (17 Apr 2021 06:51) (131/65 - 169/86)  BP(mean): --  ABP: --  ABP(mean): --  RR: 18 (17 Apr 2021 06:51) (18 - 18)  SpO2: 98% (17 Apr 2021 06:51) (97% - 98%)      RADIOLOGY  < from: MR Foot w/wo IV Cont, Right (04.17.21 @ 08:56) >    IMPRESSION:  1. Osteomyelitis of the first distal phalanx with osseous destruction and intraosseous abscess.  2. Mild synovitis of the first interphalangeal joint without osseous changes of the proximal phalanx.    < end of copied text >          PHYSICAL EXAM  Lower Extremity Focused  Vasc: DP/PT 2/4 b/l. CFT brisk x 10. Increased warmth to R great toe. Severe edema to R great toe.   Derm: Erythema and ecchymosis to R great toe.   Neuro: Sensation intact b/l  MSK: Pain w/ light touch to any aspect of the R great toe. Pt able to mobilize other digits of R foot but R great toe is stiff, unable to DF/PF 2/2 pain.

## 2021-04-17 NOTE — ED PROVIDER NOTE - PROGRESS NOTE DETAILS
seen by podiatry - pt has failed outpt ABX 3 times. will admit to their service.  worsening bony destruction on xray seen by podiatry - pt has failed outpt ABX 3 times.  worsening bony destruction on xray, will admit to medicine with podiatry to follow

## 2021-04-17 NOTE — CONSULT NOTE ADULT - ASSESSMENT
71yo F with PMHx DM2 (reports last A1c of 7%), HTN, HLD, COPD, s/p back surgery (Nov 2020) presents to [facility] ED for right great toe pain. Pt underwent ingrown nail removal (both great toe nail borders) during outpatient office visit on 2/9/21 and was prescribed Cefadroxil 500mg q12 for 1wk. Pt presented to ED on 2/17/21 and was prescribed Bactrim 1 DS tablet q12 for 1 wk. Pt presented again on 3/31 for recurrent R great toe pain, XR revealed chronic OM w/ no acute signs of infection.    69yo F with PMHx DM2 (reports last A1c of 7%), HTN, HLD, COPD, s/p back surgery (Nov 2020) presents to [facility] ED for recurrent right great toe pain. Podiatry consulted for mgmt of right great toe cellulitis w/ possible underlying osteomyelitis of distal phalanx.       P:  Admit to Medicine  Start broad spectrum IV abx   Pain control as needed    F/u final XR read  Order MRI w and w/out contrast   Podiatry following   69yo F with PMHx DM2 (reports last A1c of 7%), HTN, HLD, COPD, s/p back surgery (Nov 2020) presents to [facility] ED for recurrent right great toe pain. Pt has failed PO abx three times. Podiatry consulted for mgmt of right great toe cellulitis w/ possible underlying osteomyelitis of distal phalanx.       P:  Admit to Medicine  Start broad spectrum IV abx   Pain control as needed    F/u final XR read  Ordered MRI R foot w and w/out contrast   Podiatry following

## 2021-04-17 NOTE — CONSULT NOTE ADULT - ASSESSMENT
69 yo F with HTN, DM2, HLD and COPD with osteomyelitis R hallux with intra-osseous abscess.  She has been afebrile, initially with mild leukocytosis but without L shift, mildly elevated CRP and nl ESR.  She has received 2 doses each of vanc and pip-tazo.  If she is to undergo bone biopsy, would hold off on antibiotics unless she develops fever, more signs of systemic infection.  Suggest:  - D/C vanc and pip-tazo  - Podiatry to discuss bone biopsy.  Recommendations discussed with primary team.  Will follow with you - team 1.

## 2021-04-17 NOTE — ED ADULT NURSE REASSESSMENT NOTE - NS ED NURSE REASSESS COMMENT FT1
admit orders and s/o received, attempted to flip to ed holding, nurse not available at present, will try again

## 2021-04-17 NOTE — H&P ADULT - HISTORY OF PRESENT ILLNESS
In the ED, VS were TMax: 98.3, HR 82, /86, RR 18, SpO2 98%.  Labs significant for   CXR showed   EKG showed   Given 70 F with hx of HTN, DM (reports well controlled), HLD who presents for R great toe pain.  Patient was recently in the ED for the same and was seen by podiatry and discharged with outpatient follow up on Bactrim and had toenail removed on 4/1.  She continues to have pain, swelling, erythema and was having difficulty sleeping due to the pain.  Was scheduled for outpatient MRI tomorrow.  She noted some clear drainage at the site but never pus.  Denies fever, chills, CP, SOB, N/V, abdominal pain, diarrhea, constipation.    In the ED, VS were TMax: 98.3, HR 82, /86, RR 18, SpO2 98%.  Labs significant for   CXR showed   EKG showed   Given 70 F with hx of HTN, DM (reports well controlled), HLD who presents for R great toe pain.  Patient was recently in the ED for the same and was seen by podiatry and discharged with outpatient follow up on Bactrim and had toenail removed on 4/1.  Finished bactrim on Tuesday, per ED also took cefadroxil? She continues to have pain, swelling, erythema and was having difficulty sleeping due to the pain.  Was scheduled for outpatient MRI tomorrow.  She noted some clear drainage at the site but never pus.  Denies fever, chills, CP, SOB, N/V, abdominal pain, diarrhea, constipation.    In the ED, VS were TMax: 98.3, HR 82, /86, RR 18, SpO2 98%.  Labs significant for WBC 12.5, CRP 4.5.  EKG NSR    Given Vanc 1gm, Percocet 70 F with hx of HTN, DM (reports well controlled), HLD, COPD who presents for R great toe pain.  Patient was recently in the ED for the same and was seen by podiatry and discharged with outpatient follow up on Bactrim and had toenail removed on 4/1.  Finished bactrim on Tuesday, per ED also took cefadroxil? She continues to have pain, swelling, erythema and was having difficulty sleeping due to the pain.  Was scheduled for outpatient MRI tomorrow.  She noted some clear drainage at the site but never pus.  Denies fever, chills, CP, SOB, N/V, abdominal pain, diarrhea, constipation.    In the ED, VS were TMax: 98.3, HR 82, /86, RR 18, SpO2 98%.  Labs significant for WBC 12.5, CRP 4.5.  EKG NSR    Given Vanc 1gm, Percocet

## 2021-04-17 NOTE — ED PROVIDER NOTE - OBJECTIVE STATEMENT
70F hx htn, dm, high chol, c/o right great toe pain. pt states in march having pain around the toenail and the nail was removed by a podiatrist. pt was on cefadroxil. pt then returned to ED 3/31 and was seen by podiatry. was advised to f/u as outpt. pt states she finished ABX tuesday and is scheduled for an MRI tomorrow.  however worsening pain and swelling. some discharge from toenail bed.  no fevers. no vomiting. took ibuprofen without relief.

## 2021-04-17 NOTE — H&P ADULT - PROBLEM SELECTOR PLAN 1
R great toe erythema and cellulitis that failed outpatient bactrim, ?possible cephalosporin  - Podiatry following, prior XR with chronic appearing osteo  - IV Vanc, trough before 4th dose.  If decompensating would add pseudomonal coverage given DM  - f/u MRI w/wo contrast  - ESR wnl, CRP minimally elevated R great toe erythema and cellulitis that failed outpatient bactrim, ?possible cephalosporin  - Podiatry following, prior XR with chronic appearing osteo  - IV Vanc, trough before 4th dose. Would add pseudomonal coverage given DM  - f/u MRI w/wo contrast  - ESR wnl, CRP minimally elevated

## 2021-04-17 NOTE — ED PROVIDER NOTE - CARE PLAN
Principal Discharge DX:	Cellulitis and abscess of toe of right foot  Secondary Diagnosis:	Osteomyelitis of right foot, unspecified type

## 2021-04-17 NOTE — CONSULT NOTE ADULT - SUBJECTIVE AND OBJECTIVE BOX
HPI:  71 yo F with HTN, DM2, HLD, COPD, back surgery X 5 admitted 4/17 with OM R hallux.   ID consult for assistance with antibiotics.   Per Podiatry note, she had ingrown nail removal during podiatry visit on 2/9 – was given cefadroxil for 1 week.  Came to ED on 2/17 – was given TMP/SX X 1 week.  Xray showed subchondral lucency with cortical step off at medial aspect of distal phalanx – may represent OM.  Came to ED on 3/31 for recurrent R great toe pain.  Had XR – was unchanged.  Was referred for outpatient removal of nail.  Per Ms.  Edi, after removal, she began having worsening pain and swelling.  No fever, chills.   On 4/16, she saw a small amount of watery drainage – came to ED.  She was afebrile, had erythema and ecchymosis of R great toe, pain with light touch.  Labs showed WBC 12.6 with 69% PMNs, 22% lymphs.  BUN 11 Cr 0.59  ESR 4 CRP 4.5.  She was admitted for further management.  She was started on vancomycin and pip-tazo.  MRI today showed osseous destruction and marrow replacement of 1st distal phalanx with intraosseous abscess involving diaphysis and tuft, mild synovitis of 1st interphalangeal joint without osseous changes of proximal phalanx.  ID consult requested for management of antibiotics.        PAST MEDICAL & SURGICAL HISTORY:  Asthma    Diabetes    High cholesterol    Hypertension    H/O laminectomy  1990, 1991            MEDICATIONS  (STANDING):  atorvastatin 40 milliGRAM(s) Oral at bedtime  budesonide 160 MICROgram(s)/formoterol 4.5 MICROgram(s) Inhaler 2 Puff(s) Inhalation two times a day  dextrose 40% Gel 15 Gram(s) Oral once  dextrose 5%. 1000 milliLiter(s) (50 mL/Hr) IV Continuous <Continuous>  dextrose 5%. 1000 milliLiter(s) (100 mL/Hr) IV Continuous <Continuous>  dextrose 50% Injectable 25 Gram(s) IV Push once  dextrose 50% Injectable 12.5 Gram(s) IV Push once  dextrose 50% Injectable 25 Gram(s) IV Push once  enoxaparin Injectable 40 milliGRAM(s) SubCutaneous every 24 hours  glucagon  Injectable 1 milliGRAM(s) IntraMuscular once  insulin glargine Injectable (LANTUS) 24 Unit(s) SubCutaneous at bedtime  insulin lispro (ADMELOG) corrective regimen sliding scale   SubCutaneous three times a day before meals  nicotine -   7 mG/24Hr(s) Patch 1 patch Transdermal daily  tiotropium 18 MICROgram(s) Capsule 1 Capsule(s) Inhalation daily  valsartan 80 milliGRAM(s) Oral daily    MEDICATIONS  (PRN):  acetaminophen   Tablet .. 650 milliGRAM(s) Oral every 6 hours PRN Temp greater or equal to 38C (100.4F), Mild Pain (1 - 3)  morphine  - Injectable 1 milliGRAM(s) IV Push every 6 hours PRN Severe Pain (7 - 10)  oxyCODONE    IR 5 milliGRAM(s) Oral every 6 hours PRN Moderate Pain (4 - 6)      Allergies    No Known Allergies    Intolerances        SOCIAL HISTORY:  Born in [place], in [place] since 1958.  , lives alone.  Has 2 daughters.  Animal exposure - takes care of daughter's dog.  Smokes 4-5 cigarettes daily.  No alcohol or recreational drug use.    FAMILY HISTORY:  FH: diabetes mellitus (Father, Mother)  mother and father    ROS:  Mild HA, no photophobia, neck stiffness, rhinorrhea, sore throat, cough, CP, SOB, N, V, diarrhea, abd pain, dysuria, hematuria, frequency, muscle/joint symptoms, bruising/bleeding.      Vital Signs Last 24 Hrs  T(C): 36.9 (17 Apr 2021 12:38), Max: 36.9 (17 Apr 2021 12:38)  T(F): 98.5 (17 Apr 2021 12:38), Max: 98.5 (17 Apr 2021 12:38)  HR: 78 (17 Apr 2021 12:38) (60 - 82)  BP: 136/81 (17 Apr 2021 12:38) (131/65 - 169/86)  BP(mean): --  RR: 18 (17 Apr 2021 12:38) (18 - 18)  SpO2: 98% (17 Apr 2021 12:38) (97% - 98%)    PE:  WDWN in no distress  HEENT:  NC, PERRL, sclerae anicteric, conjunctivae clear, EOMI.  Sinuses nontender, no nasal exudate.  No buccal or pharyngeal lesions, erythema or exudate  Neck:  Supple, no adenopathy  Lungs:  Clear to auscultation  Cor:  RRR, S1, S2, no murmur appreciated  Abd:  Symmetric, normoactive BS.  Soft, nontender, no masses, guarding or rebound.  Liver and spleen not enlarged  Extrem:  R hallux edematous, mildly erythematous, markedly tender to light touch.  Not able to bend b/o pain, can move other digits.  Skin:  No rashes.    LABS:                        12.3   10.86 )-----------( 244      ( 17 Apr 2021 06:37 )             37.2     04-17    141  |  104  |  9   ----------------------------<  62<L>  3.2<L>   |  28  |  0.71    Ca    9.1      17 Apr 2021 06:37  Phos  4.0     04-17  Mg     2.0     04-17    TPro  7.0  /  Alb  4.2  /  TBili  0.4  /  DBili  x   /  AST  19  /  ALT  18  /  AlkPhos  98  04-16        RADIOLOGY & ADDITIONAL STUDIES:  < from: MR Foot w/wo IV Cont, Right (04.17.21 @ 08:56) >  FINDINGS:    OSSEOUS STRUCTURES    Osteomyelitis: There is osseous destruction marrow replacement of the first distal phalanx with intraosseous abscess involving the diaphysis and tuft. Small dorsal soft tissue wound appears to be present along the tuft.    INTERPHALANGEAL JOINTS    Articular Surfaces:  Preserved.    Effusions/Synovitis:  There is mild synovitis of the first interphalangeal joint without osseous changes of the proximal phalanx.    1ST METATARSOPHALANGEAL JOINT    Articular Surfaces:  Preserved.    Effusions/Synovitis:  None.    Sesamoids:  Intact.    LESSER METATARSOPHALANGEAL JOINTS    Articular Surfaces:  Preserved.    Effusions/Synovitis:  None.    TARSOMETATARSAL JOINTS    Articular Surfaces:  Preserved.    Effusions/Synovitis:  None.    INTERTARSAL JOINTS    Articular Surfaces:  Preserved.    Effusions/Synovitis:  None.    INTERMETATARSAL SPACES    Neuromas:  Small second webspace neuroma appears to be present.    Bursa:  Slight third webspace bursa is noted.    LISFRANC LIGAMENT  Intact.    TENDONS    Flexor Tendons:  Intact. Small peroneus longus tendon sheath effusion is noted and there is a small flexor hallucis longus tendon sheath effusion around the knot of Spencer.    Extensor Tendons:  Intact.    DISTAL PLANTAR FASCIA  Intact.    SOFT TISSUES    Musculature:  Maintained.    Subcutaneous Tissues:  Moderate subcutaneous edema involves the first toe with mild edema of the forefoot. No gas is appreciated.    Sinus Tarsi:  Small soft tissue ganglion extends into the partially imaged sinus Tarsi.    IMPRESSION:  1. Osteomyelitis of the first distal phalanx with osseous destruction and intraosseous abscess.  2. Mild synovitis of the first interphalangeal joint without osseous changes of the proximal phalanx.  3. Findings discussed withthe patient's nurse Adele on 4/17/2021 9:32 AM.      < end of copied text >

## 2021-04-18 LAB
ALBUMIN SERPL ELPH-MCNC: 3.7 G/DL — SIGNIFICANT CHANGE UP (ref 3.3–5)
ALP SERPL-CCNC: 86 U/L — SIGNIFICANT CHANGE UP (ref 40–120)
ALT FLD-CCNC: 13 U/L — SIGNIFICANT CHANGE UP (ref 10–45)
ANION GAP SERPL CALC-SCNC: 11 MMOL/L — SIGNIFICANT CHANGE UP (ref 5–17)
APTT BLD: 36.9 SEC — HIGH (ref 27.5–35.5)
AST SERPL-CCNC: 15 U/L — SIGNIFICANT CHANGE UP (ref 10–40)
BASOPHILS # BLD AUTO: 0.04 K/UL — SIGNIFICANT CHANGE UP (ref 0–0.2)
BASOPHILS NFR BLD AUTO: 0.4 % — SIGNIFICANT CHANGE UP (ref 0–2)
BILIRUB SERPL-MCNC: 0.7 MG/DL — SIGNIFICANT CHANGE UP (ref 0.2–1.2)
BLD GP AB SCN SERPL QL: NEGATIVE — SIGNIFICANT CHANGE UP
BUN SERPL-MCNC: 13 MG/DL — SIGNIFICANT CHANGE UP (ref 7–23)
CALCIUM SERPL-MCNC: 9.4 MG/DL — SIGNIFICANT CHANGE UP (ref 8.4–10.5)
CHLORIDE SERPL-SCNC: 103 MMOL/L — SIGNIFICANT CHANGE UP (ref 96–108)
CO2 SERPL-SCNC: 24 MMOL/L — SIGNIFICANT CHANGE UP (ref 22–31)
COVID-19 SPIKE DOMAIN AB INTERP: POSITIVE
COVID-19 SPIKE DOMAIN ANTIBODY RESULT: >250 U/ML — HIGH
CREAT SERPL-MCNC: 0.67 MG/DL — SIGNIFICANT CHANGE UP (ref 0.5–1.3)
EOSINOPHIL # BLD AUTO: 0.22 K/UL — SIGNIFICANT CHANGE UP (ref 0–0.5)
EOSINOPHIL NFR BLD AUTO: 2.2 % — SIGNIFICANT CHANGE UP (ref 0–6)
GLUCOSE BLDC GLUCOMTR-MCNC: 106 MG/DL — HIGH (ref 70–99)
GLUCOSE BLDC GLUCOMTR-MCNC: 200 MG/DL — HIGH (ref 70–99)
GLUCOSE BLDC GLUCOMTR-MCNC: 204 MG/DL — HIGH (ref 70–99)
GLUCOSE BLDC GLUCOMTR-MCNC: 363 MG/DL — HIGH (ref 70–99)
GLUCOSE SERPL-MCNC: 106 MG/DL — HIGH (ref 70–99)
HCT VFR BLD CALC: 38.8 % — SIGNIFICANT CHANGE UP (ref 34.5–45)
HGB BLD-MCNC: 13.1 G/DL — SIGNIFICANT CHANGE UP (ref 11.5–15.5)
IMM GRANULOCYTES NFR BLD AUTO: 0.6 % — SIGNIFICANT CHANGE UP (ref 0–1.5)
INR BLD: 0.99 — SIGNIFICANT CHANGE UP (ref 0.88–1.16)
LYMPHOCYTES # BLD AUTO: 2.07 K/UL — SIGNIFICANT CHANGE UP (ref 1–3.3)
LYMPHOCYTES # BLD AUTO: 20.4 % — SIGNIFICANT CHANGE UP (ref 13–44)
MAGNESIUM SERPL-MCNC: 2.2 MG/DL — SIGNIFICANT CHANGE UP (ref 1.6–2.6)
MCHC RBC-ENTMCNC: 31.3 PG — SIGNIFICANT CHANGE UP (ref 27–34)
MCHC RBC-ENTMCNC: 33.8 GM/DL — SIGNIFICANT CHANGE UP (ref 32–36)
MCV RBC AUTO: 92.6 FL — SIGNIFICANT CHANGE UP (ref 80–100)
MONOCYTES # BLD AUTO: 0.7 K/UL — SIGNIFICANT CHANGE UP (ref 0–0.9)
MONOCYTES NFR BLD AUTO: 6.9 % — SIGNIFICANT CHANGE UP (ref 2–14)
NEUTROPHILS # BLD AUTO: 7.06 K/UL — SIGNIFICANT CHANGE UP (ref 1.8–7.4)
NEUTROPHILS NFR BLD AUTO: 69.5 % — SIGNIFICANT CHANGE UP (ref 43–77)
NRBC # BLD: 0 /100 WBCS — SIGNIFICANT CHANGE UP (ref 0–0)
PHOSPHATE SERPL-MCNC: 3.9 MG/DL — SIGNIFICANT CHANGE UP (ref 2.5–4.5)
PLATELET # BLD AUTO: 240 K/UL — SIGNIFICANT CHANGE UP (ref 150–400)
POTASSIUM SERPL-MCNC: 4.2 MMOL/L — SIGNIFICANT CHANGE UP (ref 3.5–5.3)
POTASSIUM SERPL-SCNC: 4.2 MMOL/L — SIGNIFICANT CHANGE UP (ref 3.5–5.3)
PROT SERPL-MCNC: 6.8 G/DL — SIGNIFICANT CHANGE UP (ref 6–8.3)
PROTHROM AB SERPL-ACNC: 11.9 SEC — SIGNIFICANT CHANGE UP (ref 10.6–13.6)
RBC # BLD: 4.19 M/UL — SIGNIFICANT CHANGE UP (ref 3.8–5.2)
RBC # FLD: 13 % — SIGNIFICANT CHANGE UP (ref 10.3–14.5)
RH IG SCN BLD-IMP: POSITIVE — SIGNIFICANT CHANGE UP
SARS-COV-2 IGG+IGM SERPL QL IA: >250 U/ML — HIGH
SARS-COV-2 IGG+IGM SERPL QL IA: POSITIVE
SODIUM SERPL-SCNC: 138 MMOL/L — SIGNIFICANT CHANGE UP (ref 135–145)
WBC # BLD: 10.15 K/UL — SIGNIFICANT CHANGE UP (ref 3.8–10.5)
WBC # FLD AUTO: 10.15 K/UL — SIGNIFICANT CHANGE UP (ref 3.8–10.5)

## 2021-04-18 PROCEDURE — 99233 SBSQ HOSP IP/OBS HIGH 50: CPT | Mod: GC

## 2021-04-18 PROCEDURE — 73630 X-RAY EXAM OF FOOT: CPT | Mod: 26,LT

## 2021-04-18 RX ADMIN — Medication 1 PATCH: at 18:41

## 2021-04-18 RX ADMIN — MORPHINE SULFATE 1 MILLIGRAM(S): 50 CAPSULE, EXTENDED RELEASE ORAL at 12:47

## 2021-04-18 RX ADMIN — MORPHINE SULFATE 1 MILLIGRAM(S): 50 CAPSULE, EXTENDED RELEASE ORAL at 19:45

## 2021-04-18 RX ADMIN — OXYCODONE HYDROCHLORIDE 5 MILLIGRAM(S): 5 TABLET ORAL at 17:09

## 2021-04-18 RX ADMIN — MORPHINE SULFATE 1 MILLIGRAM(S): 50 CAPSULE, EXTENDED RELEASE ORAL at 19:08

## 2021-04-18 RX ADMIN — VALSARTAN 80 MILLIGRAM(S): 80 TABLET ORAL at 07:04

## 2021-04-18 RX ADMIN — OXYCODONE HYDROCHLORIDE 5 MILLIGRAM(S): 5 TABLET ORAL at 07:08

## 2021-04-18 RX ADMIN — BUDESONIDE AND FORMOTEROL FUMARATE DIHYDRATE 2 PUFF(S): 160; 4.5 AEROSOL RESPIRATORY (INHALATION) at 21:30

## 2021-04-18 RX ADMIN — TIOTROPIUM BROMIDE 1 CAPSULE(S): 18 CAPSULE ORAL; RESPIRATORY (INHALATION) at 12:54

## 2021-04-18 RX ADMIN — BUDESONIDE AND FORMOTEROL FUMARATE DIHYDRATE 2 PUFF(S): 160; 4.5 AEROSOL RESPIRATORY (INHALATION) at 11:17

## 2021-04-18 RX ADMIN — MORPHINE SULFATE 1 MILLIGRAM(S): 50 CAPSULE, EXTENDED RELEASE ORAL at 13:20

## 2021-04-18 RX ADMIN — Medication 2: at 12:48

## 2021-04-18 RX ADMIN — OXYCODONE HYDROCHLORIDE 5 MILLIGRAM(S): 5 TABLET ORAL at 16:15

## 2021-04-18 RX ADMIN — INSULIN GLARGINE 20 UNIT(S): 100 INJECTION, SOLUTION SUBCUTANEOUS at 22:29

## 2021-04-18 RX ADMIN — ATORVASTATIN CALCIUM 40 MILLIGRAM(S): 80 TABLET, FILM COATED ORAL at 21:30

## 2021-04-18 RX ADMIN — Medication 4: at 17:22

## 2021-04-18 RX ADMIN — OXYCODONE HYDROCHLORIDE 5 MILLIGRAM(S): 5 TABLET ORAL at 08:08

## 2021-04-18 RX ADMIN — Medication 1 PATCH: at 07:04

## 2021-04-18 RX ADMIN — Medication 1 PATCH: at 12:07

## 2021-04-18 RX ADMIN — ENOXAPARIN SODIUM 40 MILLIGRAM(S): 100 INJECTION SUBCUTANEOUS at 07:04

## 2021-04-18 RX ADMIN — Medication 1 PATCH: at 12:47

## 2021-04-18 NOTE — PROGRESS NOTE ADULT - SUBJECTIVE AND OBJECTIVE BOX
INTERVAL HPI/OVERNIGHT EVENTS:  Patient was seen and examined at bedside. As per nurse and patient, no o/n events, patient resting comfortably. No complaints at this time besides pain in her right great toe. Patient denies: fever, chills, dizziness, weakness, HA, Changes in vision, CP, palpitations, SOB, cough, N/V/D/C, dysuria, changes in bowel movements, LE edema. ROS otherwise negative.    VITAL SIGNS:  T(F): 98.1 (04-18-21 @ 20:18)  HR: 87 (04-18-21 @ 20:18)  BP: 124/77 (04-18-21 @ 20:18)  RR: 18 (04-18-21 @ 20:18)  SpO2: 99% (04-18-21 @ 20:18)  Wt(kg): --    PHYSICAL EXAM:    Constitutional: WDWN, NAD  HEENT: PERRL, EOMI, sclera non-icteric, neck supple, trachea midline, no masses, no JVD, MMM, good dentition  Respiratory: CTA b/l, good air entry b/l, no wheezing, no rhonchi, no rales, without accessory muscle use and no intercostal retractions  Cardiovascular: RRR, normal S1S2, no M/R/G  Gastrointestinal: soft, NTND, no masses palpable, BS normal  Extremities :R great toe erythema and pain to touch, no drainage, wwp; no cyanosis, clubbing or edema.   Skin: Normal temperature, warm, dry    MEDICATIONS  (STANDING):  atorvastatin 40 milliGRAM(s) Oral at bedtime  budesonide 160 MICROgram(s)/formoterol 4.5 MICROgram(s) Inhaler 2 Puff(s) Inhalation two times a day  dextrose 40% Gel 15 Gram(s) Oral once  dextrose 5%. 1000 milliLiter(s) (50 mL/Hr) IV Continuous <Continuous>  dextrose 5%. 1000 milliLiter(s) (100 mL/Hr) IV Continuous <Continuous>  dextrose 50% Injectable 25 Gram(s) IV Push once  dextrose 50% Injectable 25 Gram(s) IV Push once  dextrose 50% Injectable 12.5 Gram(s) IV Push once  enoxaparin Injectable 40 milliGRAM(s) SubCutaneous every 24 hours  glucagon  Injectable 1 milliGRAM(s) IntraMuscular once  insulin glargine Injectable (LANTUS) 20 Unit(s) SubCutaneous at bedtime  insulin lispro (ADMELOG) corrective regimen sliding scale   SubCutaneous three times a day before meals  nicotine -   7 mG/24Hr(s) Patch 1 patch Transdermal daily  tiotropium 18 MICROgram(s) Capsule 1 Capsule(s) Inhalation daily  valsartan 80 milliGRAM(s) Oral daily    MEDICATIONS  (PRN):  acetaminophen   Tablet .. 650 milliGRAM(s) Oral every 6 hours PRN Temp greater or equal to 38C (100.4F), Mild Pain (1 - 3)  morphine  - Injectable 1 milliGRAM(s) IV Push every 6 hours PRN Severe Pain (7 - 10)  oxyCODONE    IR 5 milliGRAM(s) Oral every 6 hours PRN Moderate Pain (4 - 6)      Allergies    No Known Allergies    Intolerances        LABS:                        13.1   10.15 )-----------( 240      ( 18 Apr 2021 09:01 )             38.8     04-18    138  |  103  |  13  ----------------------------<  106<H>  4.2   |  24  |  0.67    Ca    9.4      18 Apr 2021 09:01  Phos  3.9     04-18  Mg     2.2     04-18    TPro  6.8  /  Alb  3.7  /  TBili  0.7  /  DBili  x   /  AST  15  /  ALT  13  /  AlkPhos  86  04-18    PT/INR - ( 18 Apr 2021 09:01 )   PT: 11.9 sec;   INR: 0.99          PTT - ( 18 Apr 2021 09:01 )  PTT:36.9 sec      RADIOLOGY & ADDITIONAL TESTS:  Reviewed

## 2021-04-18 NOTE — PROGRESS NOTE ADULT - SUBJECTIVE AND OBJECTIVE BOX
Patient is a 70y old  Female who presents with a chief complaint of     INTERVAL HPI/ OVERNIGHT EVENTS: KIRAN O/N.       LABS                        13.1   10.15 )-----------( 240      ( 18 Apr 2021 09:01 )             38.8     04-18    138  |  103  |  13  ----------------------------<  106<H>  4.2   |  24  |  0.67    Ca    9.4      18 Apr 2021 09:01  Phos  3.9     04-18  Mg     2.2     04-18    TPro  6.8  /  Alb  3.7  /  TBili  0.7  /  DBili  x   /  AST  15  /  ALT  13  /  AlkPhos  86  04-18    PT/INR - ( 18 Apr 2021 09:01 )   PT: 11.9 sec;   INR: 0.99          PTT - ( 18 Apr 2021 09:01 )  PTT:36.9 sec    ICU Vital Signs Last 24 Hrs  T(C): 36.7 (18 Apr 2021 06:31), Max: 37.6 (17 Apr 2021 20:42)  T(F): 98.1 (18 Apr 2021 06:31), Max: 99.6 (17 Apr 2021 20:42)  HR: 68 (18 Apr 2021 06:31) (68 - 78)  BP: 112/74 (18 Apr 2021 06:31) (112/74 - 136/81)  BP(mean): --  ABP: --  ABP(mean): --  RR: 18 (18 Apr 2021 06:31) (18 - 18)  SpO2: 94% (18 Apr 2021 06:31) (94% - 98%)      RADIOLOGY    MICROBIOLOGY    PHYSICAL EXAM  Lower Extremity Focused  Vasc:  Derm:  Neuro:  MSK: Patient is a 70y old  Female who presents with a chief complaint of R great toe cellulitis    INTERVAL HPI/ OVERNIGHT EVENTS: KIRAN O/N. Pt continues to endorse severe pain to R great toe and states that it is starting to radiate to her other toes as well.       LABS                        13.1   10.15 )-----------( 240      ( 18 Apr 2021 09:01 )             38.8     04-18    138  |  103  |  13  ----------------------------<  106<H>  4.2   |  24  |  0.67    Ca    9.4      18 Apr 2021 09:01  Phos  3.9     04-18  Mg     2.2     04-18    TPro  6.8  /  Alb  3.7  /  TBili  0.7  /  DBili  x   /  AST  15  /  ALT  13  /  AlkPhos  86  04-18    PT/INR - ( 18 Apr 2021 09:01 )   PT: 11.9 sec;   INR: 0.99          PTT - ( 18 Apr 2021 09:01 )  PTT:36.9 sec    ICU Vital Signs Last 24 Hrs  T(C): 36.7 (18 Apr 2021 06:31), Max: 37.6 (17 Apr 2021 20:42)  T(F): 98.1 (18 Apr 2021 06:31), Max: 99.6 (17 Apr 2021 20:42)  HR: 68 (18 Apr 2021 06:31) (68 - 78)  BP: 112/74 (18 Apr 2021 06:31) (112/74 - 136/81)  BP(mean): --  ABP: --  ABP(mean): --  RR: 18 (18 Apr 2021 06:31) (18 - 18)  SpO2: 94% (18 Apr 2021 06:31) (94% - 98%)      RADIOLOGY  < from: MR Foot w/wo IV Cont, Right (04.17.21 @ 08:56) >    IMPRESSION:  1. Osteomyelitis of the first distal phalanx with osseous destruction and intraosseous abscess.  2. Mild synovitis of the first interphalangeal joint without osseous changes of the proximal phalanx.    < end of copied text >          PHYSICAL EXAM  Lower Extremity Focused  Vasc: DP/PT 2/4 b/l. CFT brisk x 10. Increased warmth to R great toe. Severe edema to R great toe.   Derm: Erythema and ecchymosis to R great toe. Dystrophic right great toe nail.   Neuro: Sensation intact b/l  MSK: Pain w/ light touch to any aspect of the R great toe. Pt able to mobilize other digits of R foot but R great toe is stiff, unable to DF/PF 2/2 pain.

## 2021-04-19 ENCOUNTER — RESULT REVIEW (OUTPATIENT)
Age: 71
End: 2021-04-19

## 2021-04-19 DIAGNOSIS — M86.9 OSTEOMYELITIS, UNSPECIFIED: ICD-10-CM

## 2021-04-19 DIAGNOSIS — E11.8 TYPE 2 DIABETES MELLITUS WITH UNSPECIFIED COMPLICATIONS: ICD-10-CM

## 2021-04-19 LAB
ANION GAP SERPL CALC-SCNC: 11 MMOL/L — SIGNIFICANT CHANGE UP (ref 5–17)
BASOPHILS # BLD AUTO: 0.04 K/UL — SIGNIFICANT CHANGE UP (ref 0–0.2)
BASOPHILS NFR BLD AUTO: 0.4 % — SIGNIFICANT CHANGE UP (ref 0–2)
BUN SERPL-MCNC: 16 MG/DL — SIGNIFICANT CHANGE UP (ref 7–23)
CALCIUM SERPL-MCNC: 9.4 MG/DL — SIGNIFICANT CHANGE UP (ref 8.4–10.5)
CHLORIDE SERPL-SCNC: 103 MMOL/L — SIGNIFICANT CHANGE UP (ref 96–108)
CO2 SERPL-SCNC: 24 MMOL/L — SIGNIFICANT CHANGE UP (ref 22–31)
CREAT SERPL-MCNC: 0.66 MG/DL — SIGNIFICANT CHANGE UP (ref 0.5–1.3)
EOSINOPHIL # BLD AUTO: 0.26 K/UL — SIGNIFICANT CHANGE UP (ref 0–0.5)
EOSINOPHIL NFR BLD AUTO: 2.5 % — SIGNIFICANT CHANGE UP (ref 0–6)
GLUCOSE BLDC GLUCOMTR-MCNC: 156 MG/DL — HIGH (ref 70–99)
GLUCOSE BLDC GLUCOMTR-MCNC: 200 MG/DL — HIGH (ref 70–99)
GLUCOSE BLDC GLUCOMTR-MCNC: 202 MG/DL — HIGH (ref 70–99)
GLUCOSE BLDC GLUCOMTR-MCNC: 255 MG/DL — HIGH (ref 70–99)
GLUCOSE BLDC GLUCOMTR-MCNC: 264 MG/DL — HIGH (ref 70–99)
GLUCOSE SERPL-MCNC: 164 MG/DL — HIGH (ref 70–99)
GRAM STN FLD: SIGNIFICANT CHANGE UP
HCT VFR BLD CALC: 39.8 % — SIGNIFICANT CHANGE UP (ref 34.5–45)
HGB BLD-MCNC: 13.1 G/DL — SIGNIFICANT CHANGE UP (ref 11.5–15.5)
IMM GRANULOCYTES NFR BLD AUTO: 0.5 % — SIGNIFICANT CHANGE UP (ref 0–1.5)
LYMPHOCYTES # BLD AUTO: 2.36 K/UL — SIGNIFICANT CHANGE UP (ref 1–3.3)
LYMPHOCYTES # BLD AUTO: 22.7 % — SIGNIFICANT CHANGE UP (ref 13–44)
MAGNESIUM SERPL-MCNC: 2.1 MG/DL — SIGNIFICANT CHANGE UP (ref 1.6–2.6)
MCHC RBC-ENTMCNC: 30.5 PG — SIGNIFICANT CHANGE UP (ref 27–34)
MCHC RBC-ENTMCNC: 32.9 GM/DL — SIGNIFICANT CHANGE UP (ref 32–36)
MCV RBC AUTO: 92.8 FL — SIGNIFICANT CHANGE UP (ref 80–100)
MONOCYTES # BLD AUTO: 0.64 K/UL — SIGNIFICANT CHANGE UP (ref 0–0.9)
MONOCYTES NFR BLD AUTO: 6.2 % — SIGNIFICANT CHANGE UP (ref 2–14)
NEUTROPHILS # BLD AUTO: 7.04 K/UL — SIGNIFICANT CHANGE UP (ref 1.8–7.4)
NEUTROPHILS NFR BLD AUTO: 67.7 % — SIGNIFICANT CHANGE UP (ref 43–77)
NRBC # BLD: 0 /100 WBCS — SIGNIFICANT CHANGE UP (ref 0–0)
PHOSPHATE SERPL-MCNC: 3.5 MG/DL — SIGNIFICANT CHANGE UP (ref 2.5–4.5)
PLATELET # BLD AUTO: 236 K/UL — SIGNIFICANT CHANGE UP (ref 150–400)
POTASSIUM SERPL-MCNC: 4.1 MMOL/L — SIGNIFICANT CHANGE UP (ref 3.5–5.3)
POTASSIUM SERPL-SCNC: 4.1 MMOL/L — SIGNIFICANT CHANGE UP (ref 3.5–5.3)
RBC # BLD: 4.29 M/UL — SIGNIFICANT CHANGE UP (ref 3.8–5.2)
RBC # FLD: 12.9 % — SIGNIFICANT CHANGE UP (ref 10.3–14.5)
SODIUM SERPL-SCNC: 138 MMOL/L — SIGNIFICANT CHANGE UP (ref 135–145)
SPECIMEN SOURCE: SIGNIFICANT CHANGE UP
WBC # BLD: 10.39 K/UL — SIGNIFICANT CHANGE UP (ref 3.8–10.5)
WBC # FLD AUTO: 10.39 K/UL — SIGNIFICANT CHANGE UP (ref 3.8–10.5)

## 2021-04-19 PROCEDURE — 99233 SBSQ HOSP IP/OBS HIGH 50: CPT | Mod: GC

## 2021-04-19 PROCEDURE — 88311 DECALCIFY TISSUE: CPT | Mod: 26

## 2021-04-19 PROCEDURE — 88304 TISSUE EXAM BY PATHOLOGIST: CPT | Mod: 26

## 2021-04-19 PROCEDURE — 99232 SBSQ HOSP IP/OBS MODERATE 35: CPT | Mod: GC

## 2021-04-19 RX ORDER — MORPHINE SULFATE 50 MG/1
1 CAPSULE, EXTENDED RELEASE ORAL ONCE
Refills: 0 | Status: DISCONTINUED | OUTPATIENT
Start: 2021-04-19 | End: 2021-04-19

## 2021-04-19 RX ORDER — PIPERACILLIN AND TAZOBACTAM 4; .5 G/20ML; G/20ML
4.5 INJECTION, POWDER, LYOPHILIZED, FOR SOLUTION INTRAVENOUS EVERY 6 HOURS
Refills: 0 | Status: DISCONTINUED | OUTPATIENT
Start: 2021-04-19 | End: 2021-04-21

## 2021-04-19 RX ORDER — POLYETHYLENE GLYCOL 3350 17 G/17G
17 POWDER, FOR SOLUTION ORAL EVERY 24 HOURS
Refills: 0 | Status: DISCONTINUED | OUTPATIENT
Start: 2021-04-19 | End: 2021-04-21

## 2021-04-19 RX ORDER — VANCOMYCIN HCL 1 G
VIAL (EA) INTRAVENOUS
Refills: 0 | Status: COMPLETED | OUTPATIENT
Start: 2021-04-19 | End: 2021-04-20

## 2021-04-19 RX ORDER — HYDROMORPHONE HYDROCHLORIDE 2 MG/ML
0.5 INJECTION INTRAMUSCULAR; INTRAVENOUS; SUBCUTANEOUS ONCE
Refills: 0 | Status: DISCONTINUED | OUTPATIENT
Start: 2021-04-19 | End: 2021-04-19

## 2021-04-19 RX ORDER — PIPERACILLIN AND TAZOBACTAM 4; .5 G/20ML; G/20ML
4.5 INJECTION, POWDER, LYOPHILIZED, FOR SOLUTION INTRAVENOUS ONCE
Refills: 0 | Status: COMPLETED | OUTPATIENT
Start: 2021-04-19 | End: 2021-04-19

## 2021-04-19 RX ORDER — HYDROMORPHONE HYDROCHLORIDE 2 MG/ML
1 INJECTION INTRAMUSCULAR; INTRAVENOUS; SUBCUTANEOUS ONCE
Refills: 0 | Status: DISCONTINUED | OUTPATIENT
Start: 2021-04-19 | End: 2021-04-19

## 2021-04-19 RX ORDER — VANCOMYCIN HCL 1 G
1000 VIAL (EA) INTRAVENOUS ONCE
Refills: 0 | Status: COMPLETED | OUTPATIENT
Start: 2021-04-19 | End: 2021-04-19

## 2021-04-19 RX ORDER — VANCOMYCIN HCL 1 G
1000 VIAL (EA) INTRAVENOUS EVERY 12 HOURS
Refills: 0 | Status: COMPLETED | OUTPATIENT
Start: 2021-04-20 | End: 2021-04-20

## 2021-04-19 RX ADMIN — Medication 1 PATCH: at 11:51

## 2021-04-19 RX ADMIN — BUDESONIDE AND FORMOTEROL FUMARATE DIHYDRATE 2 PUFF(S): 160; 4.5 AEROSOL RESPIRATORY (INHALATION) at 21:46

## 2021-04-19 RX ADMIN — MORPHINE SULFATE 1 MILLIGRAM(S): 50 CAPSULE, EXTENDED RELEASE ORAL at 16:37

## 2021-04-19 RX ADMIN — PIPERACILLIN AND TAZOBACTAM 200 GRAM(S): 4; .5 INJECTION, POWDER, LYOPHILIZED, FOR SOLUTION INTRAVENOUS at 18:38

## 2021-04-19 RX ADMIN — ENOXAPARIN SODIUM 40 MILLIGRAM(S): 100 INJECTION SUBCUTANEOUS at 06:04

## 2021-04-19 RX ADMIN — HYDROMORPHONE HYDROCHLORIDE 1 MILLIGRAM(S): 2 INJECTION INTRAMUSCULAR; INTRAVENOUS; SUBCUTANEOUS at 11:47

## 2021-04-19 RX ADMIN — OXYCODONE HYDROCHLORIDE 5 MILLIGRAM(S): 5 TABLET ORAL at 15:34

## 2021-04-19 RX ADMIN — HYDROMORPHONE HYDROCHLORIDE 1 MILLIGRAM(S): 2 INJECTION INTRAMUSCULAR; INTRAVENOUS; SUBCUTANEOUS at 11:30

## 2021-04-19 RX ADMIN — VALSARTAN 80 MILLIGRAM(S): 80 TABLET ORAL at 05:44

## 2021-04-19 RX ADMIN — Medication 6: at 21:45

## 2021-04-19 RX ADMIN — HYDROMORPHONE HYDROCHLORIDE 0.5 MILLIGRAM(S): 2 INJECTION INTRAMUSCULAR; INTRAVENOUS; SUBCUTANEOUS at 19:03

## 2021-04-19 RX ADMIN — Medication 4: at 18:45

## 2021-04-19 RX ADMIN — OXYCODONE HYDROCHLORIDE 5 MILLIGRAM(S): 5 TABLET ORAL at 21:52

## 2021-04-19 RX ADMIN — POLYETHYLENE GLYCOL 3350 17 GRAM(S): 17 POWDER, FOR SOLUTION ORAL at 15:37

## 2021-04-19 RX ADMIN — MORPHINE SULFATE 1 MILLIGRAM(S): 50 CAPSULE, EXTENDED RELEASE ORAL at 05:45

## 2021-04-19 RX ADMIN — TIOTROPIUM BROMIDE 1 CAPSULE(S): 18 CAPSULE ORAL; RESPIRATORY (INHALATION) at 11:50

## 2021-04-19 RX ADMIN — MORPHINE SULFATE 1 MILLIGRAM(S): 50 CAPSULE, EXTENDED RELEASE ORAL at 14:42

## 2021-04-19 RX ADMIN — Medication 1 PATCH: at 20:25

## 2021-04-19 RX ADMIN — MORPHINE SULFATE 1 MILLIGRAM(S): 50 CAPSULE, EXTENDED RELEASE ORAL at 06:43

## 2021-04-19 RX ADMIN — PIPERACILLIN AND TAZOBACTAM 200 GRAM(S): 4; .5 INJECTION, POWDER, LYOPHILIZED, FOR SOLUTION INTRAVENOUS at 23:25

## 2021-04-19 RX ADMIN — Medication 1 PATCH: at 06:04

## 2021-04-19 RX ADMIN — OXYCODONE HYDROCHLORIDE 5 MILLIGRAM(S): 5 TABLET ORAL at 08:53

## 2021-04-19 RX ADMIN — MORPHINE SULFATE 1 MILLIGRAM(S): 50 CAPSULE, EXTENDED RELEASE ORAL at 16:59

## 2021-04-19 RX ADMIN — INSULIN GLARGINE 20 UNIT(S): 100 INJECTION, SOLUTION SUBCUTANEOUS at 21:46

## 2021-04-19 RX ADMIN — Medication 250 MILLIGRAM(S): at 21:49

## 2021-04-19 RX ADMIN — ATORVASTATIN CALCIUM 40 MILLIGRAM(S): 80 TABLET, FILM COATED ORAL at 21:47

## 2021-04-19 RX ADMIN — OXYCODONE HYDROCHLORIDE 5 MILLIGRAM(S): 5 TABLET ORAL at 16:38

## 2021-04-19 RX ADMIN — OXYCODONE HYDROCHLORIDE 5 MILLIGRAM(S): 5 TABLET ORAL at 10:02

## 2021-04-19 RX ADMIN — MORPHINE SULFATE 1 MILLIGRAM(S): 50 CAPSULE, EXTENDED RELEASE ORAL at 15:15

## 2021-04-19 RX ADMIN — Medication 1 PATCH: at 11:49

## 2021-04-19 RX ADMIN — BUDESONIDE AND FORMOTEROL FUMARATE DIHYDRATE 2 PUFF(S): 160; 4.5 AEROSOL RESPIRATORY (INHALATION) at 10:28

## 2021-04-19 RX ADMIN — Medication 6: at 12:31

## 2021-04-19 RX ADMIN — HYDROMORPHONE HYDROCHLORIDE 0.5 MILLIGRAM(S): 2 INJECTION INTRAMUSCULAR; INTRAVENOUS; SUBCUTANEOUS at 18:43

## 2021-04-19 NOTE — PROGRESS NOTE ADULT - ATTENDING COMMENTS
70 year old woman with DM, presenting with R great toe pain. MRI foot showing osteomyelitis of right hallux with intra-osseus abscess. Underwent bone biopsy today. Bone culture pending. Vanc and zosyn restarted.   PICC placement tomorrow in anticipation of discharge on long course of IV abx.

## 2021-04-19 NOTE — PROGRESS NOTE ADULT - PROBLEM SELECTOR PLAN 3
On Basaglar 30 at home  - 80% home dose, lantus 24  - MISS # Diabetes with long term insulin use, complicated by diabetic foot infection  On Basaglar 30 at home  - 80% home dose, lantus 24  - MISS

## 2021-04-19 NOTE — PROGRESS NOTE ADULT - ASSESSMENT
69 yo F with HTN, DM2, HLD and COPD with osteomyelitis R hallux with intra-osseous abscess.  She has been afebrile, initially with mild leukocytosis but without L shift, mildly elevated CRP and nl ESR.  She has received 2 doses each of vanc and pip-tazo.  If she is to undergo bone biopsy, would hold off on antibiotics unless she develops fever, more signs of systemic infection.  Suggest:  - F/U bone biopsy surgical swab, bone culture and pathology  - Restart vancomycin 1 g IV q12h.  Trough prior to 4th dose.  Goal:  14-17  - Restart pip-tazo 3.375 g IV q6h  Recommendations discussed with primary team.  Will follow with you - team 1.

## 2021-04-19 NOTE — PROGRESS NOTE ADULT - ASSESSMENT
71yo F with PMHx DM2 (reports last A1c of 7%), HTN, HLD, COPD, s/p back surgery (Nov 2020) presents to [facility] ED for recurrent right great toe pain. Pt has failed PO abx three times. Podiatry consulted for mgmt of right great toe cellulitis w/ possible underlying osteomyelitis of distal phalanx. MRI showing OM of distal phalanx of R great toe.    P:   Hold IV abx till after bone biopsy   Will perform bedside bone biopsy of R distal phalanx Mon 4/19 @ noon   Consent obtained  F/u ID recs  WBAT to right heel in surgical shoe  Podiatry following

## 2021-04-19 NOTE — CHART NOTE - NSCHARTNOTEFT_GEN_A_CORE
PROCEDURE NOTE   JAMSHIDI NEEDLE, PERCUTANEOUS CORTICAL BONE BIOPSY  Right great toe distal phalanx     Dilaudid 1mg IV administered prior to procedure.   Injected 14cc 1% lidocaine plain + 0.5% Marcaine in cameron block fashion to R great toe.   1cm incision to distal tip of right great toe made with sterile #11 blade. Approx 1cc purulence expressed, surgical swab collected.   Core biopsy of distal phalanx successful, samples sent for cx and pathology.   Pt tolerated procedure well without any adverse event.       Attending: Kane Stanley DPM  Resident: Ngozi Cain DPM PROCEDURE NOTE   PERCUTANEOUS CORTICAL BONE BIOPSY w/ JAMSHIDI NEEDLE  Osteomyelitis of right great toe distal phalanx    Dilaudid 1mg IV administered prior to procedure.   Injected 14cc 1% lidocaine plain + 0.5% Marcaine in cameron block fashion to R great toe.   Sterile environment set up around right foot, foot prepped with chlorhexidine.   1cm incision to distal tip of right great toe made with sterile #11 blade. Approx 1cc purulence expressed, surgical swab collected.   Core biopsy of distal phalanx successful, samples sent for cx and pathology.   Pt tolerated procedure well without any adverse event.       Attending: Kane Stanley DPM  Resident: Ngozi Cain DPM PROCEDURE NOTE   PERCUTANEOUS CORTICAL BONE BIOPSY w/ JAMSHIDI NEEDLE  Osteomyelitis of right great toe distal phalanx  Consent obtained, in chart.       Dilaudid 1mg IV administered prior to procedure.   Injected 14cc 1% lidocaine plain + 0.5% Marcaine in cameron block fashion to R great toe.   Sterile environment set up around right foot, foot prepped with chlorhexidine.   1cm incision to distal tip of right great toe made with sterile #11 blade. Approx 1cc purulence expressed, surgical swab collected.   Core biopsy of distal phalanx successful, samples sent for cx and pathology.   Incision left open to allow for continued drainage.   Pt tolerated procedure well without any adverse event.       Plan:   Will f/u results of surgical swab, bone cx & pathology   Start broad spectrum IV abx   Weight bearing as tolerated to right foot, surgical shoe dispensed  F/u ID recs for trt of OM, will likely need PICC line pending cx & path results

## 2021-04-19 NOTE — PROGRESS NOTE ADULT - SUBJECTIVE AND OBJECTIVE BOX
Patient is a 70y old  Female who presents with a chief complaint of     INTERVAL HPI/ OVERNIGHT EVENTS: KIRAN O/N.       LABS                        13.1   10.15 )-----------( 240      ( 18 Apr 2021 09:01 )             38.8     04-18    138  |  103  |  13  ----------------------------<  106<H>  4.2   |  24  |  0.67    Ca    9.4      18 Apr 2021 09:01  Phos  3.9     04-18  Mg     2.2     04-18    TPro  6.8  /  Alb  3.7  /  TBili  0.7  /  DBili  x   /  AST  15  /  ALT  13  /  AlkPhos  86  04-18    PT/INR - ( 18 Apr 2021 09:01 )   PT: 11.9 sec;   INR: 0.99          PTT - ( 18 Apr 2021 09:01 )  PTT:36.9 sec    ICU Vital Signs Last 24 Hrs  T(C): 36.8 (19 Apr 2021 05:45), Max: 36.8 (18 Apr 2021 12:55)  T(F): 98.3 (19 Apr 2021 05:45), Max: 98.3 (18 Apr 2021 12:55)  HR: 60 (19 Apr 2021 05:45) (60 - 87)  BP: 131/88 (19 Apr 2021 05:45) (124/72 - 131/88)  BP(mean): --  ABP: --  ABP(mean): --  RR: 18 (19 Apr 2021 05:45) (18 - 18)  SpO2: 96% (19 Apr 2021 05:45) (95% - 99%)      RADIOLOGY  < from: MR Foot w/wo IV Cont, Right (04.17.21 @ 08:56) >    IMPRESSION:  1. Osteomyelitis of the first distal phalanx with osseous destruction and intraosseous abscess.  2. Mild synovitis of the first interphalangeal joint without osseous changes of the proximal phalanx.  3. Findings discussed withthe patient's nurse Adele on 4/17/2021 9:32 AM.    < end of copied text >  < from: Xray Toes, Right Foot (04.17.21 @ 00:00) >  IMPRESSION: Findings consistent with osteomyelitis involving distal phalanx first toe right foot    < end of copied text >      MICROBIOLOGY    PHYSICAL EXAM  Lower Extremity Focused  Vasc: DP/PT 2/4 b/l. CFT brisk x 10. Increased warmth to R great toe. Severe edema to R great toe.   Derm: Erythema and ecchymosis to R great toe. Dystrophic right great toe nail.   Neuro: Sensation intact b/l  MSK: Pain w/ light touch to any aspect of the R great toe. Pt able to mobilize other digits of R foot but R great toe is stiff, unable to DF/PF 2/2 pain.

## 2021-04-19 NOTE — PROGRESS NOTE ADULT - SUBJECTIVE AND OBJECTIVE BOX
INTERVAL HPI/OVERNIGHT EVENTS:  Patient was seen and examined at bedside. As per nurse and patient, no o/n events, patient resting comfortably. Has pain with movement of big toe Patient denies: fever, chills, dizziness, weakness, HA, Changes in vision, CP, palpitations, SOB, cough, N/V/D/C, dysuria, changes in bowel movements, LE edema. ROS otherwise negative.    VITAL SIGNS:  T(F): 98.3 (04-19-21 @ 05:45)  HR: 60 (04-19-21 @ 05:45)  BP: 131/88 (04-19-21 @ 05:45)  RR: 18 (04-19-21 @ 05:45)  SpO2: 96% (04-19-21 @ 05:45)  Wt(kg): --    PHYSICAL EXAM:    Constitutional: resting in bed  HEENT: PERRL, EOMI, sclera non-icteric, neck supple, trachea midline, no masses, no JVD, MMM, good dentition  Respiratory: CTA b/l, good air entry b/l, no wheezing, no rhonchi, no rales, without accessory muscle use and no intercostal retractions  Cardiovascular: RRR, normal S1S2, no M/R/G  Gastrointestinal: soft, NTND, no masses palpable, BS normal  Extremities: R great toe erythema and pain to touch, no drainage, wwp; no cyanosis, clubbing or edema. Neurological: AAOx3, CN Grossly intact  Skin: Normal temperature, warm, dry    MEDICATIONS  (STANDING):  atorvastatin 40 milliGRAM(s) Oral at bedtime  budesonide 160 MICROgram(s)/formoterol 4.5 MICROgram(s) Inhaler 2 Puff(s) Inhalation two times a day  dextrose 40% Gel 15 Gram(s) Oral once  dextrose 5%. 1000 milliLiter(s) (50 mL/Hr) IV Continuous <Continuous>  dextrose 5%. 1000 milliLiter(s) (100 mL/Hr) IV Continuous <Continuous>  dextrose 50% Injectable 25 Gram(s) IV Push once  dextrose 50% Injectable 12.5 Gram(s) IV Push once  dextrose 50% Injectable 25 Gram(s) IV Push once  enoxaparin Injectable 40 milliGRAM(s) SubCutaneous every 24 hours  glucagon  Injectable 1 milliGRAM(s) IntraMuscular once  insulin glargine Injectable (LANTUS) 20 Unit(s) SubCutaneous at bedtime  insulin lispro (ADMELOG) corrective regimen sliding scale   SubCutaneous Before meals and at bedtime  nicotine -   7 mG/24Hr(s) Patch 1 patch Transdermal daily  tiotropium 18 MICROgram(s) Capsule 1 Capsule(s) Inhalation daily  valsartan 80 milliGRAM(s) Oral daily    MEDICATIONS  (PRN):  acetaminophen   Tablet .. 650 milliGRAM(s) Oral every 6 hours PRN Temp greater or equal to 38C (100.4F), Mild Pain (1 - 3)  morphine  - Injectable 1 milliGRAM(s) IV Push every 6 hours PRN Severe Pain (7 - 10)  oxyCODONE    IR 5 milliGRAM(s) Oral every 6 hours PRN Moderate Pain (4 - 6)      Allergies    No Known Allergies    Intolerances        LABS:                        13.1   10.39 )-----------( 236      ( 19 Apr 2021 08:38 )             39.8     04-19    138  |  103  |  16  ----------------------------<  164<H>  4.1   |  24  |  0.66    Ca    9.4      19 Apr 2021 08:38  Phos  3.5     04-19  Mg     2.1     04-19    TPro  6.8  /  Alb  3.7  /  TBili  0.7  /  DBili  x   /  AST  15  /  ALT  13  /  AlkPhos  86  04-18    PT/INR - ( 18 Apr 2021 09:01 )   PT: 11.9 sec;   INR: 0.99          PTT - ( 18 Apr 2021 09:01 )  PTT:36.9 sec      RADIOLOGY & ADDITIONAL TESTS:  Reviewed

## 2021-04-19 NOTE — PROGRESS NOTE ADULT - SUBJECTIVE AND OBJECTIVE BOX
INTERVAL HPI/OVERNIGHT EVENTS:  Remains afebrile.  S/p bone biopsy this afternoon    CONSTITUTIONAL:  No fever, chills, night sweats  EYES:  No photophobia or visual changes  CARDIOVASCULAR:  No chest pain  RESPIRATORY:  No cough, wheezing, or SOB   GASTROINTESTINAL:  No nausea, vomiting, diarrhea, constipation, or abdominal pain  GENITOURINARY:  No frequency, urgency, dysuria or hematuria  NEUROLOGIC:  No headache, lightheadedness      ANTIBIOTICS/RELEVANT:    None      Vital Signs Last 24 Hrs  T(C): 37.1 (19 Apr 2021 20:39), Max: 37.1 (19 Apr 2021 20:39)  T(F): 98.7 (19 Apr 2021 20:39), Max: 98.7 (19 Apr 2021 20:39)  HR: 99 (19 Apr 2021 20:39) (60 - 99)  BP: 111/72 (19 Apr 2021 20:39) (111/72 - 131/88)  BP(mean): --  RR: 18 (19 Apr 2021 20:39) (18 - 19)  SpO2: 95% (19 Apr 2021 20:39) (93% - 96%)    PHYSICAL EXAM:  Constitutional:  Well-developed, well nourished  Eyes:  Sclerae anicterica, conjunctivae clear, PERRL  Ear/Nose/Throat:  No nasal exudate or sinus tenderness;  No buccal mucosal lesions, no pharyngeal erythema or exudate	  Neck:  Supple, no adenopathy  Respiratory:  Clear bilaterally  Cardiovascular:  RRR, S1S2, no murmur appreciated  Gastrointestinal:  Symmetric, normoactive BS, soft, NT, no masses, guarding or rebound.  No HSM  Extremities:  No edema.  R foot dressed by Podiatry      LABS:                        13.1   10.39 )-----------( 236      ( 19 Apr 2021 08:38 )             39.8         04-19    138  |  103  |  16  ----------------------------<  164<H>  4.1   |  24  |  0.66    Ca    9.4      19 Apr 2021 08:38  Phos  3.5     04-19  Mg     2.1     04-19    TPro  6.8  /  Alb  3.7  /  TBili  0.7  /  DBili  x   /  AST  15  /  ALT  13  /  AlkPhos  86  04-18          MICROBIOLOGY:        RADIOLOGY & ADDITIONAL STUDIES:

## 2021-04-19 NOTE — PROGRESS NOTE ADULT - PROBLEM SELECTOR PLAN 2
- XR with chronic osteo possible acute on chronic   - f/u MRI as above, ABx as above - XR with chronic osteo possible acute on chronic   - MRI showing osteomyelitis of right hallux, likely intraosseus abscess. , ABx as above

## 2021-04-20 ENCOUNTER — APPOINTMENT (OUTPATIENT)
Dept: VASCULAR SURGERY | Facility: CLINIC | Age: 71
End: 2021-04-20

## 2021-04-20 LAB
ANION GAP SERPL CALC-SCNC: 9 MMOL/L — SIGNIFICANT CHANGE UP (ref 5–17)
BASOPHILS # BLD AUTO: 0.05 K/UL — SIGNIFICANT CHANGE UP (ref 0–0.2)
BASOPHILS NFR BLD AUTO: 0.5 % — SIGNIFICANT CHANGE UP (ref 0–2)
BUN SERPL-MCNC: 15 MG/DL — SIGNIFICANT CHANGE UP (ref 7–23)
CALCIUM SERPL-MCNC: 9.3 MG/DL — SIGNIFICANT CHANGE UP (ref 8.4–10.5)
CHLORIDE SERPL-SCNC: 102 MMOL/L — SIGNIFICANT CHANGE UP (ref 96–108)
CO2 SERPL-SCNC: 27 MMOL/L — SIGNIFICANT CHANGE UP (ref 22–31)
CREAT SERPL-MCNC: 0.76 MG/DL — SIGNIFICANT CHANGE UP (ref 0.5–1.3)
EOSINOPHIL # BLD AUTO: 0.27 K/UL — SIGNIFICANT CHANGE UP (ref 0–0.5)
EOSINOPHIL NFR BLD AUTO: 2.8 % — SIGNIFICANT CHANGE UP (ref 0–6)
GLUCOSE BLDC GLUCOMTR-MCNC: 180 MG/DL — HIGH (ref 70–99)
GLUCOSE BLDC GLUCOMTR-MCNC: 194 MG/DL — HIGH (ref 70–99)
GLUCOSE BLDC GLUCOMTR-MCNC: 246 MG/DL — HIGH (ref 70–99)
GLUCOSE BLDC GLUCOMTR-MCNC: 261 MG/DL — HIGH (ref 70–99)
GLUCOSE SERPL-MCNC: 116 MG/DL — HIGH (ref 70–99)
GRAM STN FLD: SIGNIFICANT CHANGE UP
HCT VFR BLD CALC: 35.4 % — SIGNIFICANT CHANGE UP (ref 34.5–45)
HGB BLD-MCNC: 11.5 G/DL — SIGNIFICANT CHANGE UP (ref 11.5–15.5)
IMM GRANULOCYTES NFR BLD AUTO: 0.6 % — SIGNIFICANT CHANGE UP (ref 0–1.5)
LYMPHOCYTES # BLD AUTO: 1.75 K/UL — SIGNIFICANT CHANGE UP (ref 1–3.3)
LYMPHOCYTES # BLD AUTO: 17.8 % — SIGNIFICANT CHANGE UP (ref 13–44)
MAGNESIUM SERPL-MCNC: 2.2 MG/DL — SIGNIFICANT CHANGE UP (ref 1.6–2.6)
MCHC RBC-ENTMCNC: 30 PG — SIGNIFICANT CHANGE UP (ref 27–34)
MCHC RBC-ENTMCNC: 32.5 GM/DL — SIGNIFICANT CHANGE UP (ref 32–36)
MCV RBC AUTO: 92.4 FL — SIGNIFICANT CHANGE UP (ref 80–100)
MONOCYTES # BLD AUTO: 0.85 K/UL — SIGNIFICANT CHANGE UP (ref 0–0.9)
MONOCYTES NFR BLD AUTO: 8.7 % — SIGNIFICANT CHANGE UP (ref 2–14)
NEUTROPHILS # BLD AUTO: 6.83 K/UL — SIGNIFICANT CHANGE UP (ref 1.8–7.4)
NEUTROPHILS NFR BLD AUTO: 69.6 % — SIGNIFICANT CHANGE UP (ref 43–77)
NRBC # BLD: 0 /100 WBCS — SIGNIFICANT CHANGE UP (ref 0–0)
PHOSPHATE SERPL-MCNC: 3.3 MG/DL — SIGNIFICANT CHANGE UP (ref 2.5–4.5)
PLATELET # BLD AUTO: 213 K/UL — SIGNIFICANT CHANGE UP (ref 150–400)
POTASSIUM SERPL-MCNC: 4.1 MMOL/L — SIGNIFICANT CHANGE UP (ref 3.5–5.3)
POTASSIUM SERPL-SCNC: 4.1 MMOL/L — SIGNIFICANT CHANGE UP (ref 3.5–5.3)
RBC # BLD: 3.83 M/UL — SIGNIFICANT CHANGE UP (ref 3.8–5.2)
RBC # FLD: 12.9 % — SIGNIFICANT CHANGE UP (ref 10.3–14.5)
SODIUM SERPL-SCNC: 138 MMOL/L — SIGNIFICANT CHANGE UP (ref 135–145)
SPECIMEN SOURCE: SIGNIFICANT CHANGE UP
SURGICAL PATHOLOGY STUDY: SIGNIFICANT CHANGE UP
WBC # BLD: 9.81 K/UL — SIGNIFICANT CHANGE UP (ref 3.8–10.5)
WBC # FLD AUTO: 9.81 K/UL — SIGNIFICANT CHANGE UP (ref 3.8–10.5)

## 2021-04-20 PROCEDURE — 99232 SBSQ HOSP IP/OBS MODERATE 35: CPT | Mod: GC

## 2021-04-20 PROCEDURE — 76937 US GUIDE VASCULAR ACCESS: CPT | Mod: 26,59

## 2021-04-20 PROCEDURE — 99233 SBSQ HOSP IP/OBS HIGH 50: CPT | Mod: GC

## 2021-04-20 PROCEDURE — 36569 INSJ PICC 5 YR+ W/O IMAGING: CPT

## 2021-04-20 PROCEDURE — 99221 1ST HOSP IP/OBS SF/LOW 40: CPT | Mod: GC

## 2021-04-20 RX ORDER — MORPHINE SULFATE 50 MG/1
2 CAPSULE, EXTENDED RELEASE ORAL EVERY 6 HOURS
Refills: 0 | Status: DISCONTINUED | OUTPATIENT
Start: 2021-04-20 | End: 2021-04-21

## 2021-04-20 RX ORDER — HYDROMORPHONE HYDROCHLORIDE 2 MG/ML
0.5 INJECTION INTRAMUSCULAR; INTRAVENOUS; SUBCUTANEOUS ONCE
Refills: 0 | Status: DISCONTINUED | OUTPATIENT
Start: 2021-04-20 | End: 2021-04-20

## 2021-04-20 RX ORDER — INSULIN GLARGINE 100 [IU]/ML
24 INJECTION, SOLUTION SUBCUTANEOUS AT BEDTIME
Refills: 0 | Status: DISCONTINUED | OUTPATIENT
Start: 2021-04-20 | End: 2021-04-21

## 2021-04-20 RX ORDER — SODIUM CHLORIDE 9 MG/ML
10 INJECTION INTRAMUSCULAR; INTRAVENOUS; SUBCUTANEOUS
Refills: 0 | Status: DISCONTINUED | OUTPATIENT
Start: 2021-04-20 | End: 2021-04-21

## 2021-04-20 RX ORDER — CHLORHEXIDINE GLUCONATE 213 G/1000ML
1 SOLUTION TOPICAL
Refills: 0 | Status: DISCONTINUED | OUTPATIENT
Start: 2021-04-21 | End: 2021-04-21

## 2021-04-20 RX ORDER — SENNA PLUS 8.6 MG/1
2 TABLET ORAL EVERY 24 HOURS
Refills: 0 | Status: DISCONTINUED | OUTPATIENT
Start: 2021-04-20 | End: 2021-04-21

## 2021-04-20 RX ADMIN — PIPERACILLIN AND TAZOBACTAM 200 GRAM(S): 4; .5 INJECTION, POWDER, LYOPHILIZED, FOR SOLUTION INTRAVENOUS at 11:49

## 2021-04-20 RX ADMIN — VALSARTAN 80 MILLIGRAM(S): 80 TABLET ORAL at 06:13

## 2021-04-20 RX ADMIN — PIPERACILLIN AND TAZOBACTAM 200 GRAM(S): 4; .5 INJECTION, POWDER, LYOPHILIZED, FOR SOLUTION INTRAVENOUS at 06:13

## 2021-04-20 RX ADMIN — Medication 250 MILLIGRAM(S): at 19:19

## 2021-04-20 RX ADMIN — BUDESONIDE AND FORMOTEROL FUMARATE DIHYDRATE 2 PUFF(S): 160; 4.5 AEROSOL RESPIRATORY (INHALATION) at 08:34

## 2021-04-20 RX ADMIN — POLYETHYLENE GLYCOL 3350 17 GRAM(S): 17 POWDER, FOR SOLUTION ORAL at 15:41

## 2021-04-20 RX ADMIN — Medication 4: at 22:20

## 2021-04-20 RX ADMIN — Medication 250 MILLIGRAM(S): at 06:51

## 2021-04-20 RX ADMIN — OXYCODONE HYDROCHLORIDE 5 MILLIGRAM(S): 5 TABLET ORAL at 00:07

## 2021-04-20 RX ADMIN — Medication 2: at 08:34

## 2021-04-20 RX ADMIN — HYDROMORPHONE HYDROCHLORIDE 0.5 MILLIGRAM(S): 2 INJECTION INTRAMUSCULAR; INTRAVENOUS; SUBCUTANEOUS at 08:37

## 2021-04-20 RX ADMIN — Medication 1 PATCH: at 11:50

## 2021-04-20 RX ADMIN — ENOXAPARIN SODIUM 40 MILLIGRAM(S): 100 INJECTION SUBCUTANEOUS at 06:13

## 2021-04-20 RX ADMIN — TIOTROPIUM BROMIDE 1 CAPSULE(S): 18 CAPSULE ORAL; RESPIRATORY (INHALATION) at 10:43

## 2021-04-20 RX ADMIN — HYDROMORPHONE HYDROCHLORIDE 0.5 MILLIGRAM(S): 2 INJECTION INTRAMUSCULAR; INTRAVENOUS; SUBCUTANEOUS at 09:01

## 2021-04-20 RX ADMIN — INSULIN GLARGINE 24 UNIT(S): 100 INJECTION, SOLUTION SUBCUTANEOUS at 22:18

## 2021-04-20 RX ADMIN — Medication 1 PATCH: at 19:26

## 2021-04-20 RX ADMIN — Medication 6: at 13:24

## 2021-04-20 RX ADMIN — MORPHINE SULFATE 2 MILLIGRAM(S): 50 CAPSULE, EXTENDED RELEASE ORAL at 15:41

## 2021-04-20 RX ADMIN — OXYCODONE HYDROCHLORIDE 5 MILLIGRAM(S): 5 TABLET ORAL at 13:05

## 2021-04-20 RX ADMIN — ATORVASTATIN CALCIUM 40 MILLIGRAM(S): 80 TABLET, FILM COATED ORAL at 22:18

## 2021-04-20 RX ADMIN — BUDESONIDE AND FORMOTEROL FUMARATE DIHYDRATE 2 PUFF(S): 160; 4.5 AEROSOL RESPIRATORY (INHALATION) at 22:00

## 2021-04-20 RX ADMIN — OXYCODONE HYDROCHLORIDE 5 MILLIGRAM(S): 5 TABLET ORAL at 11:49

## 2021-04-20 RX ADMIN — PIPERACILLIN AND TAZOBACTAM 200 GRAM(S): 4; .5 INJECTION, POWDER, LYOPHILIZED, FOR SOLUTION INTRAVENOUS at 22:17

## 2021-04-20 RX ADMIN — PIPERACILLIN AND TAZOBACTAM 200 GRAM(S): 4; .5 INJECTION, POWDER, LYOPHILIZED, FOR SOLUTION INTRAVENOUS at 17:32

## 2021-04-20 RX ADMIN — MORPHINE SULFATE 2 MILLIGRAM(S): 50 CAPSULE, EXTENDED RELEASE ORAL at 16:02

## 2021-04-20 RX ADMIN — Medication 2: at 17:28

## 2021-04-20 RX ADMIN — MORPHINE SULFATE 2 MILLIGRAM(S): 50 CAPSULE, EXTENDED RELEASE ORAL at 22:59

## 2021-04-20 RX ADMIN — SENNA PLUS 2 TABLET(S): 8.6 TABLET ORAL at 15:41

## 2021-04-20 RX ADMIN — Medication 1 PATCH: at 07:28

## 2021-04-20 NOTE — PROGRESS NOTE ADULT - ASSESSMENT
69yo F with PMHx DM2 (reports last A1c of 7%), HTN, HLD, COPD, s/p back surgery (Nov 2020) presents to [facility] ED for recurrent right great toe pain. Pt has failed PO abx three times. Podiatry consulted for mgmt of right great toe cellulitis w/ possible underlying osteomyelitis of distal phalanx. MRI confirmed OM of distal phalanx of R great toe. Percutaneous bone biopsy performed at bedside 4/19.     P:   Continue IV abx   pain control as needed  Culture swab growing G(+) cocci in pairs   F/u bone cx and path of distal phalanx   F/u ID recs  WBAT to right heel in surgical shoe  Podiatry following

## 2021-04-20 NOTE — PROCEDURE NOTE - PICC: IMPLANT LOT NUMBER
bard [id number], power picc length 35cm. picc tip ends at svc-ra junction, confirmed with 3cg ultrasound

## 2021-04-20 NOTE — PROGRESS NOTE ADULT - PROBLEM SELECTOR PLAN 6
F: none  E: replete electrolytes K< 4, Mg <2  N: DASH/TLC, carb consistent diet  DVT PPx: Lovenox

## 2021-04-20 NOTE — PROGRESS NOTE ADULT - SUBJECTIVE AND OBJECTIVE BOX
INTERVAL HPI/OVERNIGHT EVENTS:  Patient was seen and examined at bedside. As per nurse and patient, no o/n events, pt still experiencing severe pain s/p bone biopsy.  Patient denies: fever, chills, dizziness, weakness, HA, Changes in vision, CP, palpitations, SOB, cough, N/V/D/C, dysuria, changes in bowel movements, LE edema. ROS otherwise negative.    VITAL SIGNS:  T(F): 98.2 (04-20-21 @ 05:55)  HR: 77 (04-20-21 @ 05:55)  BP: 113/72 (04-20-21 @ 05:55)  RR: 18 (04-20-21 @ 05:55)  SpO2: 94% (04-20-21 @ 05:55)  Wt(kg): --    PHYSICAL EXAM:    Constitutional: resting in bed  HEENT: PERRL, EOMI  Respiratory: CTA b/l, good air entry b/l, no wheezing, no rhonchi, no rales, without accessory muscle use and no intercostal retractions  Cardiovascular: RRR, normal S1S2, no M/R/G  Gastrointestinal: soft, NTND, no masses palpable, BS normal  Extremities: R foot currently wrapped no ozzing or drainge through packing seen no cyanosis, clubbing or edema. Neurological: AAOx3, CN Grossly intact  Skin: Normal temperature, warm, dry    MEDICATIONS  (STANDING):  atorvastatin 40 milliGRAM(s) Oral at bedtime  budesonide 160 MICROgram(s)/formoterol 4.5 MICROgram(s) Inhaler 2 Puff(s) Inhalation two times a day  dextrose 40% Gel 15 Gram(s) Oral once  dextrose 5%. 1000 milliLiter(s) (50 mL/Hr) IV Continuous <Continuous>  dextrose 5%. 1000 milliLiter(s) (100 mL/Hr) IV Continuous <Continuous>  dextrose 50% Injectable 25 Gram(s) IV Push once  dextrose 50% Injectable 12.5 Gram(s) IV Push once  dextrose 50% Injectable 25 Gram(s) IV Push once  enoxaparin Injectable 40 milliGRAM(s) SubCutaneous every 24 hours  glucagon  Injectable 1 milliGRAM(s) IntraMuscular once  insulin glargine Injectable (LANTUS) 20 Unit(s) SubCutaneous at bedtime  insulin lispro (ADMELOG) corrective regimen sliding scale   SubCutaneous Before meals and at bedtime  nicotine -   7 mG/24Hr(s) Patch 1 patch Transdermal daily  piperacillin/tazobactam IVPB.. 4.5 Gram(s) IV Intermittent every 6 hours  polyethylene glycol 3350 17 Gram(s) Oral every 24 hours  tiotropium 18 MICROgram(s) Capsule 1 Capsule(s) Inhalation daily  valsartan 80 milliGRAM(s) Oral daily  vancomycin  IVPB      vancomycin  IVPB 1000 milliGRAM(s) IV Intermittent every 12 hours    MEDICATIONS  (PRN):  acetaminophen   Tablet .. 650 milliGRAM(s) Oral every 6 hours PRN Temp greater or equal to 38C (100.4F), Mild Pain (1 - 3)  morphine  - Injectable 1 milliGRAM(s) IV Push every 6 hours PRN Severe Pain (7 - 10)  oxyCODONE    IR 5 milliGRAM(s) Oral every 6 hours PRN Moderate Pain (4 - 6)  sodium chloride 0.9% lock flush 10 milliLiter(s) IV Push every 1 hour PRN Pre/post blood products, medications, blood draw, and to maintain line patency      Allergies    No Known Allergies    Intolerances        LABS:                        11.5   9.81  )-----------( 213      ( 20 Apr 2021 06:44 )             35.4     04-20    138  |  102  |  15  ----------------------------<  116<H>  4.1   |  27  |  0.76    Ca    9.3      20 Apr 2021 06:44  Phos  3.3     04-20  Mg     2.2     04-20            RADIOLOGY & ADDITIONAL TESTS:  Reviewed

## 2021-04-20 NOTE — CONSULT NOTE ADULT - SUBJECTIVE AND OBJECTIVE BOX
CONSULT NOTE    HPI:  70 F with hx of HTN, DM (reports well controlled), HLD, COPD who presents for R great toe pain.  Patient was recently in the ED for the same and was seen by podiatry and discharged with outpatient follow up on Bactrim and had toenail removed on 4/1.  Finished bactrim on Tuesday, per ED also took cefadroxil? She continues to have pain, swelling, erythema and was having difficulty sleeping due to the pain.  Was scheduled for outpatient MRI tomorrow.  She noted some clear drainage at the site but never pus.  Denies fever, chills, CP, SOB, N/V, abdominal pain, diarrhea, constipation.    Vascular Addendum: Vascular surgery consulted for R 1st toe osteomyelitis. Patient admitted for continued pain, swelling erythema of the 1st toe. MRI performed revealed Osteomyelitis of the first distal Phalanx w/ osseous destruction and intraosseous abscess. Ulture of the wound growing Methicillin sensitive staff aureus on Vanc/Zosyn. Underwent percutaneous bone biopsy by Podiatry on 4/19. Patient states noted pain of toe and pain of the calf after walking 2 blocks. No paresthesias of the extremity.         PAST MEDICAL & SURGICAL HISTORY:  Asthma    Diabetes    High cholesterol    Hypertension    H/O laminectomy  1990, 1991        PAST SURGICAL HISTORY:     REVIEW OF SYSTEMS:   Pertinent positives/negatives noted in HPI.     HOME MEDICATIONS:    ALLERGIES:  Allergies    No Known Allergies    Intolerances        SOCIAL HISTORY:    FAMILY HISTORY:  FH: diabetes mellitus (Father, Mother)  mother and father        Vital Signs Last 24 Hrs  T(C): 36.8 (20 Apr 2021 05:55), Max: 37.1 (19 Apr 2021 20:39)  T(F): 98.2 (20 Apr 2021 05:55), Max: 98.7 (19 Apr 2021 20:39)  HR: 77 (20 Apr 2021 05:55) (77 - 99)  BP: 113/72 (20 Apr 2021 05:55) (111/72 - 113/72)  BP(mean): --  RR: 18 (20 Apr 2021 05:55) (18 - 18)  SpO2: 94% (20 Apr 2021 05:55) (94% - 95%)    PHYSICAL EXAM:  General: NAD, resting comfortably in bed  C/V: NSR  Pulm: Nonlabored breathing, no respiratory distress  Abd: soft, non-tender, non-distended.  Extrem: WWP, no edema, no calf tenderness, RLE swelling and erythema of the R 1st toe, ttp, no drainage  Pulses: RLE: 2+ DP/PT             LLE: 1+ DP/2+PT    LABS:                        11.5   9.81  )-----------( 213      ( 20 Apr 2021 06:44 )             35.4     04-20    138  |  102  |  15  ----------------------------<  116<H>  4.1   |  27  |  0.76    Ca    9.3      20 Apr 2021 06:44  Phos  3.3     04-20  Mg     2.2     04-20            RADIOLOGY AND ADDITIONAL STUDIES  < from: MR Foot w/wo IV Cont, Right (04.17.21 @ 08:56) >   Subcutaneous Tissues:  Moderate subcutaneous edema involves the first toe with mild edema of the forefoot. No gas is appreciated.    Sinus Tarsi:  Small soft tissue ganglion extends into the partially imaged sinus Tarsi.    IMPRESSION:  1. Osteomyelitis of the first distal phalanx with osseous destruction and intraosseous abscess.  2. Mild synovitis of the first interphalangeal joint without osseous changes of the proximal phalanx.  3. Findings discussed withthe patient's nurse Adele on 4/17/2021 9:32 AM.    < end of copied text >

## 2021-04-20 NOTE — PROGRESS NOTE ADULT - PROBLEM SELECTOR PLAN 5
c/w home symbicort and interchange for incruse ellipta

## 2021-04-20 NOTE — PROGRESS NOTE ADULT - ASSESSMENT
70F with PMH of HTN, DM2, HLD and COPD with osteomyelitis R hallux with intra-osseous abscess. She has been afebrile, initially with mild leukocytosis but without L shift, mildly elevated CRP and nl ESR. She received 2 doses each of vanc and pip-tazo prior to bone biopsy. Antibiotics resumed post procedure. Surgical swab culture growing  Staphylococcus aureus and Staphylococcus lugdunensis.    Suggest:  - follow bone biopsy surgical swab, bone culture and pathology  - Continue vancomycin 1 g IV q12h.  Trough prior to 4th dose.  Goal: 14-17  - Stop Zosyn 3.375 g IV q6h  - Place PICC today    Recommendations discussed with primary team. ID Team 1 will follow.

## 2021-04-20 NOTE — PROGRESS NOTE ADULT - ATTENDING COMMENTS
As above.  Bone biopsy culture is growing MSSA and Staph lugdenensis, which frequently is methacillin-susceptible.  Need susceptibilities to be able to give final recommendations.  Continue vancomycin for now - trough prior to next dose, sheduled for 7 am.  Will follow with you - team 1. As above.  Bone biopsy culture is growing MSSA and Staph lugdenensis, which frequently is methacillin-susceptible.  Need susceptibilities to be able to give final recommendations.  Continue vancomycin for now - trough prior to next dose, scheduled for 7 am.  Will follow with you - team 1.

## 2021-04-20 NOTE — PROGRESS NOTE ADULT - PROBLEM SELECTOR PROBLEM 1
Cellulitis and abscess of toe of right foot

## 2021-04-20 NOTE — PROGRESS NOTE ADULT - PROBLEM SELECTOR PLAN 1
R great toe erythema and cellulitis that failed outpatient bactrim, ?possible cephalosporin  - Podiatry following, prior XR with chronic appearing osteo  - Abx held for biopsy will resum per podiatry rec  - f/u MRI w/wo contrast  - bedside bone biopsy 4/19  - ESR wnl, CRP minimally elevated
R great toe erythema and cellulitis that failed outpatient bactrim, ?possible cephalosporin  - Podiatry following, prior XR with chronic appearing osteo  - IV Vanc, trough before 4th dose. Would add pseudomonal coverage given DM  - f/u MRI w/wo contrast  - bedside bone biopsy 4/19  - ESR wnl, CRP minimally elevated
R great toe erythema and cellulitis that failed outpatient bactrim, ?possible cephalosporin  - Podiatry following, prior XR with chronic appearing osteo  - MRI of R great toe showing chronic osteo  - bedside bone biopsy 4/19  - Bone Bx Cx showing staph lugdunesis and MSSA  - picc line placed   - Zosyn dc'd per G+ speciation   - c/w vanc  - ESR wnl, CRP minimally elevated

## 2021-04-20 NOTE — PROGRESS NOTE ADULT - SUBJECTIVE AND OBJECTIVE BOX
Patient is a 70y old  Female who presents with a chief complaint of     INTERVAL HPI/ OVERNIGHT EVENTS: KIRAN O/N. Pt continues to experience a lot of pain and tenderness to R great toe. Endorses some relief of pain following the bone biopsy after using ice packs.       LABS                        13.1   10.39 )-----------( 236      ( 19 Apr 2021 08:38 )             39.8     04-20    138  |  102  |  15  ----------------------------<  116<H>  4.1   |  27  |  0.76    Ca    9.3      20 Apr 2021 06:44  Phos  3.3     04-20  Mg     2.2     04-20    TPro  6.8  /  Alb  3.7  /  TBili  0.7  /  DBili  x   /  AST  15  /  ALT  13  /  AlkPhos  86  04-18    PT/INR - ( 18 Apr 2021 09:01 )   PT: 11.9 sec;   INR: 0.99          PTT - ( 18 Apr 2021 09:01 )  PTT:36.9 sec    ICU Vital Signs Last 24 Hrs  T(C): 36.8 (20 Apr 2021 05:55), Max: 37.1 (19 Apr 2021 20:39)  T(F): 98.2 (20 Apr 2021 05:55), Max: 98.7 (19 Apr 2021 20:39)  HR: 77 (20 Apr 2021 05:55) (77 - 99)  BP: 113/72 (20 Apr 2021 05:55) (111/72 - 121/68)  BP(mean): --  ABP: --  ABP(mean): --  RR: 18 (20 Apr 2021 05:55) (18 - 19)  SpO2: 94% (20 Apr 2021 05:55) (93% - 95%)      RADIOLOGY  < from: MR Foot w/wo IV Cont, Right (04.17.21 @ 08:56) >    IMPRESSION:  1. Osteomyelitis of the first distal phalanx with osseous destruction and intraosseous abscess.  2. Mild synovitis of the first interphalangeal joint without osseous changes of the proximal phalanx.  3. Findings discussed withthe patient's nurse Adele on 4/17/2021 9:32 AM.    < end of copied text >  < from: Xray Toes, Right Foot (04.17.21 @ 00:00) >  IMPRESSION: Findings consistent with osteomyelitis involving distal phalanx first toe right foot    < end of copied text >        MICROBIOLOGY    Culture - Surgical Swab (collected 19 Apr 2021 13:14)  Source: .Surgical Swab R foot great toe  Gram Stain (19 Apr 2021 19:14):    Rare Gram positive cocci in pairs    Few WBC's        PHYSICAL EXAM  Lower Extremity Focused  Vasc: DP/PT 2/4 b/l. CFT brisk x 10. Increased warmth to R great toe. Severe edema to R great toe.   Derm: Erythema and ecchymosis to R great toe. Dystrophic right great toe nail. 1cm incision to distal tip of R great toe corresponding to site of percutaneous bone biopsy; no active drainage from incision.   Neuro: Sensation intact b/l  MSK: Pain w/ light touch to any aspect of the R great toe. Pt able to mobilize other digits of R foot but R great toe is stiff, unable to DF/PF 2/2 pain.

## 2021-04-20 NOTE — CONSULT NOTE ADULT - SUBJECTIVE AND OBJECTIVE BOX
Vascular Access Service Consult Note    70yFemaleHEAL ISSUES - PROBLEM Dx:  Cellulitis and abscess of toe of right foot  Cellulitis and abscess of toe of right foot    Diabetes  Diabetes    Hypertension  Hypertension    COPD (chronic obstructive pulmonary disease)  COPD (chronic obstructive pulmonary disease)    Nutrition, metabolism, and development symptoms  Nutrition, metabolism, and development symptoms    Osteomyelitis of right foot, unspecified type  Osteomyelitis of right foot, unspecified type    Diabetes mellitus with complication  Diabetes mellitus with complication               Diagnosis: foot osteo    Indications for Vascular Access (Check all that apply)  [ x ]  Antibiotic Therapy       Antibiotic Prescribed:  vanco x6 weeks            [  ]  IV Hydration  [  ]  Total Parenteral Nutrition  [  ]  Chemotherapy  [  ]  Difficult Venous Access  [  ]  CVP monitoring  [  ]  Medications with high potential for tissue necrosis on extravasation  [  ]  Other    Screening (Check all that apply)  Previous Radiation to chest  [  ] Yes      [ x ]  No  Breast Cancer                          [  ] Left     [  ]  Right    [ x ]  No  Lymph Node Dissection         [  ] Left     [  ]  Right    [x  ]  No  Pacemaker or ICD                   [  ] Left     [  ]  Right    [ x ]  No  Upper Extremity DVT             [  ] Left     [  ]  Right    [x  ]  No  Chronic Kidney Disease         [  ]  Yes     [  x]  No  Hemodialysis                           [  ]  Yes     [ x ]  No  AV Fistula/ Graft                     [  ]  Left    [  ]  Right    [x  ]  No  Temp>101F in past 24 H       [  ]  Yes     [ x ]  No  H/O PICC/Midline                   [  ]  Yes     [x ]  No    Lab data:                        11.5   9.81  )-----------( 213      ( 20 Apr 2021 06:44 )             35.4     04-20    138  |  102  |  15  ----------------------------<  116<H>  4.1   |  27  |  0.76    Ca    9.3      20 Apr 2021 06:44  Phos  3.3     04-20  Mg     2.2     04-20        bcx ngtd          I have reviewed the chart, interviewed and examined the patient and determined that this patient:  [ x ] Is a candidate for a PICC line  [  ] Is a candidate for a Midline  [  ] Is not a candidate for vascular access device (reason)    Lumens:    [ x ] Single  [  ] Double

## 2021-04-20 NOTE — CONSULT NOTE ADULT - ASSESSMENT
70 F with hx of HTN, DM (reports well controlled), HLD, COPD who presents for R great toe pain 2/2 Osteomyelitis. Vascular consulted to r/o PAD.    Given palpable pulses, PAD unlikely  No acute surgical intervention at this time  Vascular surgery will sign off  Plan discussed w/ attending and chief resident.

## 2021-04-20 NOTE — PROGRESS NOTE ADULT - SUBJECTIVE AND OBJECTIVE BOX
SUBJECTIVE / INTERVAL HPI: Patient seen and examined at the bedside this morning. Patient has pain in the right foot s/p bone biopsy on 4/19.     VITAL SIGNS:  Vital Signs Last 24 Hrs  T(C): 36.8 (20 Apr 2021 05:55), Max: 37.1 (19 Apr 2021 20:39)  T(F): 98.2 (20 Apr 2021 05:55), Max: 98.7 (19 Apr 2021 20:39)  HR: 77 (20 Apr 2021 05:55) (77 - 99)  BP: 113/72 (20 Apr 2021 05:55) (111/72 - 113/72)  RR: 18 (20 Apr 2021 05:55) (18 - 18)  SpO2: 94% (20 Apr 2021 05:55) (94% - 95%)    PHYSICAL EXAM:    General: in NAD, resting comfortably in bed  HEENT: PERRL, anicteric sclera; MMM  Neck: supple  Cardiovascular: +S1/S2, RRR  Respiratory: clear to auscultation B/L  Gastrointestinal: soft, NT/ND  Extremities: WWP; no edema; right foot dressing clean, dry, and intact  Vascular: 2+ radial, DP/PT pulses B/L  Neurological: AAOx3; no focal deficits    MEDICATIONS:  MEDICATIONS  (STANDING):  atorvastatin 40 milliGRAM(s) Oral at bedtime  budesonide 160 MICROgram(s)/formoterol 4.5 MICROgram(s) Inhaler 2 Puff(s) Inhalation two times a day  enoxaparin Injectable 40 milliGRAM(s) SubCutaneous every 24 hours  insulin glargine Injectable (LANTUS) 24 Unit(s) SubCutaneous at bedtime  insulin lispro (ADMELOG) corrective regimen sliding scale   SubCutaneous Before meals and at bedtime  nicotine -   7 mG/24Hr(s) Patch 1 patch Transdermal daily  piperacillin/tazobactam IVPB.. 4.5 Gram(s) IV Intermittent every 6 hours  polyethylene glycol 3350 17 Gram(s) Oral every 24 hours  senna 2 Tablet(s) Oral every 24 hours  tiotropium 18 MICROgram(s) Capsule 1 Capsule(s) Inhalation daily  valsartan 80 milliGRAM(s) Oral daily  vancomycin  IVPB 1000 milliGRAM(s) IV Intermittent every 12 hours    MEDICATIONS  (PRN):  acetaminophen   Tablet .. 650 milliGRAM(s) Oral every 6 hours PRN Temp greater or equal to 38C (100.4F), Mild Pain (1 - 3)  morphine  - Injectable 2 milliGRAM(s) IV Push every 6 hours PRN Severe Pain (7 - 10)  oxyCODONE    IR 5 milliGRAM(s) Oral every 6 hours PRN Moderate Pain (4 - 6)  sodium chloride 0.9% lock flush 10 milliLiter(s) IV Push every 1 hour PRN Pre/post blood products, medications, blood draw, and to maintain line patency      ALLERGIES:  Allergies    No Known Allergies    Intolerances        LABS:                        11.5   9.81  )-----------( 213      ( 20 Apr 2021 06:44 )             35.4     04-20    138  |  102  |  15  ----------------------------<  116<H>  4.1   |  27  |  0.76    Ca    9.3      20 Apr 2021 06:44  Phos  3.3     04-20  Mg     2.2     04-20          CAPILLARY BLOOD GLUCOSE  POCT Blood Glucose.: 180 mg/dL (20 Apr 2021 17:22)      MICROBIOLOGY:  Culture - Tissue (04.19.21 @ 13:15)   Specimen Source: .Tissue Other, R foot distal phalanx bone   Culture in progress     Culture - Surgical Swab (04.19.21 @ 13:14)   Specimen Source: .Surgical Swab R foot great toe   Culture Results:   Moderate Staphylococcus aureus Presumptive Methicillin susceptible. Susceptibility to follow.   Moderate Staphylococcus lugdunensis Susceptibility to follow.   Culture in progress     Culture - Blood (04.17.21 @ 03:42)   No growth at 3 days.       RADIOLOGY & ADDITIONAL TESTS: Reviewed.  < from: Xray Foot AP + Lateral + Oblique, Left (04.18.21 @ 11:30) >  impression: No radiographic findings to suggest the presence of acute osteomyelitis. No acute fracture or dislocation. Hallux valgus, bunion/soft tissue swelling. Calcaneal Achilles tendon enthesophyte  < end of copied text >    < from: MR Foot w/wo IV Cont, Right (04.17.21 @ 08:56) >  IMPRESSION:  1. Osteomyelitis of the first distal phalanx with osseous destruction and intraosseous abscess.  2. Mild synovitis of the first interphalangeal joint without osseous changes of the proximal phalanx.  < end of copied text >    < from: Xray Toes, Right Foot (04.17.21 @ 00:00) >  IMPRESSION: Findings consistent with osteomyelitis involving distal phalanx first toe right foot  < end of copied text >

## 2021-04-20 NOTE — PROGRESS NOTE ADULT - PROBLEM SELECTOR PLAN 3
# Diabetes with long term insulin use, complicated by diabetic foot infection  On Basaglar 30 at home  - lantus 24  - MISS

## 2021-04-21 ENCOUNTER — TRANSCRIPTION ENCOUNTER (OUTPATIENT)
Age: 71
End: 2021-04-21

## 2021-04-21 VITALS
OXYGEN SATURATION: 95 % | RESPIRATION RATE: 18 BRPM | HEART RATE: 72 BPM | SYSTOLIC BLOOD PRESSURE: 126 MMHG | DIASTOLIC BLOOD PRESSURE: 67 MMHG | TEMPERATURE: 97 F

## 2021-04-21 LAB
-  CEFAZOLIN: SIGNIFICANT CHANGE UP
-  CEFAZOLIN: SIGNIFICANT CHANGE UP
-  CLINDAMYCIN: SIGNIFICANT CHANGE UP
-  CLINDAMYCIN: SIGNIFICANT CHANGE UP
-  ERYTHROMYCIN: SIGNIFICANT CHANGE UP
-  ERYTHROMYCIN: SIGNIFICANT CHANGE UP
-  LINEZOLID: SIGNIFICANT CHANGE UP
-  LINEZOLID: SIGNIFICANT CHANGE UP
-  OXACILLIN: SIGNIFICANT CHANGE UP
-  OXACILLIN: SIGNIFICANT CHANGE UP
-  RIFAMPIN: SIGNIFICANT CHANGE UP
-  RIFAMPIN: SIGNIFICANT CHANGE UP
-  TRIMETHOPRIM/SULFAMETHOXAZOLE: SIGNIFICANT CHANGE UP
-  TRIMETHOPRIM/SULFAMETHOXAZOLE: SIGNIFICANT CHANGE UP
-  VANCOMYCIN: SIGNIFICANT CHANGE UP
-  VANCOMYCIN: SIGNIFICANT CHANGE UP
GLUCOSE BLDC GLUCOMTR-MCNC: 155 MG/DL — HIGH (ref 70–99)
GLUCOSE BLDC GLUCOMTR-MCNC: 247 MG/DL — HIGH (ref 70–99)
GLUCOSE BLDC GLUCOMTR-MCNC: 308 MG/DL — HIGH (ref 70–99)
METHOD TYPE: SIGNIFICANT CHANGE UP
METHOD TYPE: SIGNIFICANT CHANGE UP
VANCOMYCIN TROUGH SERPL-MCNC: 9 UG/ML — LOW (ref 10–20)

## 2021-04-21 PROCEDURE — 83036 HEMOGLOBIN GLYCOSYLATED A1C: CPT

## 2021-04-21 PROCEDURE — 73630 X-RAY EXAM OF FOOT: CPT

## 2021-04-21 PROCEDURE — 73720 MRI LWR EXTREMITY W/O&W/DYE: CPT

## 2021-04-21 PROCEDURE — 83735 ASSAY OF MAGNESIUM: CPT

## 2021-04-21 PROCEDURE — 99232 SBSQ HOSP IP/OBS MODERATE 35: CPT | Mod: GC

## 2021-04-21 PROCEDURE — 86140 C-REACTIVE PROTEIN: CPT

## 2021-04-21 PROCEDURE — 87070 CULTURE OTHR SPECIMN AEROBIC: CPT

## 2021-04-21 PROCEDURE — 99285 EMERGENCY DEPT VISIT HI MDM: CPT | Mod: 25

## 2021-04-21 PROCEDURE — 86850 RBC ANTIBODY SCREEN: CPT

## 2021-04-21 PROCEDURE — 86900 BLOOD TYPING SEROLOGIC ABO: CPT

## 2021-04-21 PROCEDURE — 36573 INSJ PICC RS&I 5 YR+: CPT

## 2021-04-21 PROCEDURE — A9585: CPT

## 2021-04-21 PROCEDURE — 80202 ASSAY OF VANCOMYCIN: CPT

## 2021-04-21 PROCEDURE — 87186 SC STD MICRODIL/AGAR DIL: CPT

## 2021-04-21 PROCEDURE — U0003: CPT

## 2021-04-21 PROCEDURE — U0005: CPT

## 2021-04-21 PROCEDURE — 80048 BASIC METABOLIC PNL TOTAL CA: CPT

## 2021-04-21 PROCEDURE — 88304 TISSUE EXAM BY PATHOLOGIST: CPT

## 2021-04-21 PROCEDURE — 88311 DECALCIFY TISSUE: CPT

## 2021-04-21 PROCEDURE — 73660 X-RAY EXAM OF TOE(S): CPT

## 2021-04-21 PROCEDURE — 99239 HOSP IP/OBS DSCHRG MGMT >30: CPT

## 2021-04-21 PROCEDURE — 87040 BLOOD CULTURE FOR BACTERIA: CPT

## 2021-04-21 PROCEDURE — 83605 ASSAY OF LACTIC ACID: CPT

## 2021-04-21 PROCEDURE — 85610 PROTHROMBIN TIME: CPT

## 2021-04-21 PROCEDURE — 85730 THROMBOPLASTIN TIME PARTIAL: CPT

## 2021-04-21 PROCEDURE — 80053 COMPREHEN METABOLIC PANEL: CPT

## 2021-04-21 PROCEDURE — 36415 COLL VENOUS BLD VENIPUNCTURE: CPT

## 2021-04-21 PROCEDURE — 84100 ASSAY OF PHOSPHORUS: CPT

## 2021-04-21 PROCEDURE — 86901 BLOOD TYPING SEROLOGIC RH(D): CPT

## 2021-04-21 PROCEDURE — 85025 COMPLETE CBC W/AUTO DIFF WBC: CPT

## 2021-04-21 PROCEDURE — 87075 CULTR BACTERIA EXCEPT BLOOD: CPT

## 2021-04-21 PROCEDURE — 94640 AIRWAY INHALATION TREATMENT: CPT

## 2021-04-21 PROCEDURE — 85652 RBC SED RATE AUTOMATED: CPT

## 2021-04-21 PROCEDURE — 82962 GLUCOSE BLOOD TEST: CPT

## 2021-04-21 PROCEDURE — 86769 SARS-COV-2 COVID-19 ANTIBODY: CPT

## 2021-04-21 RX ORDER — VANCOMYCIN HCL 1 G
VIAL (EA) INTRAVENOUS
Refills: 0 | Status: DISCONTINUED | OUTPATIENT
Start: 2021-04-21 | End: 2021-04-21

## 2021-04-21 RX ORDER — NAFCILLIN 10 G/100ML
100 INJECTION, POWDER, FOR SOLUTION INTRAVENOUS
Qty: 25200 | Refills: 0
Start: 2021-04-21 | End: 2021-06-01

## 2021-04-21 RX ORDER — OXYCODONE AND ACETAMINOPHEN 5; 325 MG/1; MG/1
1 TABLET ORAL
Qty: 20 | Refills: 0
Start: 2021-04-21

## 2021-04-21 RX ORDER — OXYCODONE AND ACETAMINOPHEN 5; 325 MG/1; MG/1
1 TABLET ORAL
Qty: 20 | Refills: 0
Start: 2021-04-21 | End: 2021-04-25

## 2021-04-21 RX ORDER — VANCOMYCIN HCL 1 G
1250 VIAL (EA) INTRAVENOUS ONCE
Refills: 0 | Status: COMPLETED | OUTPATIENT
Start: 2021-04-21 | End: 2021-04-21

## 2021-04-21 RX ORDER — NAFCILLIN 10 G/100ML
2 INJECTION, POWDER, FOR SOLUTION INTRAVENOUS ONCE
Refills: 0 | Status: COMPLETED | OUTPATIENT
Start: 2021-04-21 | End: 2021-04-21

## 2021-04-21 RX ORDER — NAFCILLIN 10 G/100ML
2 INJECTION, POWDER, FOR SOLUTION INTRAVENOUS EVERY 4 HOURS
Refills: 0 | Status: DISCONTINUED | OUTPATIENT
Start: 2021-04-21 | End: 2021-04-21

## 2021-04-21 RX ORDER — NAFCILLIN 10 G/100ML
INJECTION, POWDER, FOR SOLUTION INTRAVENOUS
Refills: 0 | Status: DISCONTINUED | OUTPATIENT
Start: 2021-04-21 | End: 2021-04-21

## 2021-04-21 RX ORDER — SODIUM CHLORIDE 9 MG/ML
10 INJECTION INTRAMUSCULAR; INTRAVENOUS; SUBCUTANEOUS
Qty: 840 | Refills: 0
Start: 2021-04-21 | End: 2021-06-01

## 2021-04-21 RX ORDER — VANCOMYCIN HCL 1 G
1250 VIAL (EA) INTRAVENOUS EVERY 12 HOURS
Refills: 0 | Status: DISCONTINUED | OUTPATIENT
Start: 2021-04-21 | End: 2021-04-21

## 2021-04-21 RX ADMIN — Medication 1 PATCH: at 11:21

## 2021-04-21 RX ADMIN — Medication 166.67 MILLIGRAM(S): at 11:20

## 2021-04-21 RX ADMIN — Medication 1 PATCH: at 06:09

## 2021-04-21 RX ADMIN — MORPHINE SULFATE 2 MILLIGRAM(S): 50 CAPSULE, EXTENDED RELEASE ORAL at 05:48

## 2021-04-21 RX ADMIN — Medication 2: at 08:47

## 2021-04-21 RX ADMIN — Medication 8: at 13:07

## 2021-04-21 RX ADMIN — Medication 1 PATCH: at 12:00

## 2021-04-21 RX ADMIN — POLYETHYLENE GLYCOL 3350 17 GRAM(S): 17 POWDER, FOR SOLUTION ORAL at 14:47

## 2021-04-21 RX ADMIN — CHLORHEXIDINE GLUCONATE 1 APPLICATION(S): 213 SOLUTION TOPICAL at 05:42

## 2021-04-21 RX ADMIN — VALSARTAN 80 MILLIGRAM(S): 80 TABLET ORAL at 05:41

## 2021-04-21 RX ADMIN — TIOTROPIUM BROMIDE 1 CAPSULE(S): 18 CAPSULE ORAL; RESPIRATORY (INHALATION) at 12:42

## 2021-04-21 RX ADMIN — NAFCILLIN 200 GRAM(S): 10 INJECTION, POWDER, FOR SOLUTION INTRAVENOUS at 18:16

## 2021-04-21 RX ADMIN — PIPERACILLIN AND TAZOBACTAM 200 GRAM(S): 4; .5 INJECTION, POWDER, LYOPHILIZED, FOR SOLUTION INTRAVENOUS at 05:41

## 2021-04-21 RX ADMIN — Medication 1 PATCH: at 19:03

## 2021-04-21 RX ADMIN — SENNA PLUS 2 TABLET(S): 8.6 TABLET ORAL at 14:47

## 2021-04-21 RX ADMIN — ENOXAPARIN SODIUM 40 MILLIGRAM(S): 100 INJECTION SUBCUTANEOUS at 06:09

## 2021-04-21 RX ADMIN — MORPHINE SULFATE 2 MILLIGRAM(S): 50 CAPSULE, EXTENDED RELEASE ORAL at 00:21

## 2021-04-21 RX ADMIN — BUDESONIDE AND FORMOTEROL FUMARATE DIHYDRATE 2 PUFF(S): 160; 4.5 AEROSOL RESPIRATORY (INHALATION) at 08:49

## 2021-04-21 RX ADMIN — NAFCILLIN 200 GRAM(S): 10 INJECTION, POWDER, FOR SOLUTION INTRAVENOUS at 14:52

## 2021-04-21 RX ADMIN — MORPHINE SULFATE 2 MILLIGRAM(S): 50 CAPSULE, EXTENDED RELEASE ORAL at 06:50

## 2021-04-21 RX ADMIN — Medication 4: at 18:17

## 2021-04-21 RX ADMIN — OXYCODONE HYDROCHLORIDE 5 MILLIGRAM(S): 5 TABLET ORAL at 19:16

## 2021-04-21 NOTE — PROGRESS NOTE ADULT - ASSESSMENT
69yo F with PMHx DM2 (reports last A1c of 7%), HTN, HLD, COPD, s/p back surgery (Nov 2020) presents to [facility] ED for recurrent right great toe pain. Pt has failed PO abx three times. Podiatry consulted for mgmt of right great toe cellulitis w/ possible underlying osteomyelitis of distal phalanx. MRI confirmed OM of distal phalanx of R great toe. Percutaneous bone biopsy performed at bedside 4/19.     P:   Continue IV abx   pain control as needed  Culture swab growing G(+) cocci in pairs   F/u bone cx and path of distal phalanx   F/u ID recs  WBAT to right heel in surgical shoe  Podiatry following    71yo F with PMHx DM2 (reports last A1c of 7%), HTN, HLD, COPD, s/p back surgery (Nov 2020) presents to [facility] ED for recurrent right great toe pain. Pt has failed PO abx three times. Podiatry consulted for mgmt of right great toe cellulitis w/ possible underlying osteomyelitis of distal phalanx. MRI confirmed OM of distal phalanx of R great toe. Percutaneous bone biopsy performed at bedside 4/19.     P:   Continue IV abx   pain control as needed  Culture swab growing MSSA, Staph lugdunensis  F/u bone cx and path of distal phalanx   F/u ID recs  WBAT to right heel in surgical shoe  Podiatry following    71yo F with PMHx DM2 (reports last A1c of 7%), HTN, HLD, COPD, s/p back surgery (Nov 2020) presents to [facility] ED for recurrent right great toe pain. Pt has failed PO abx three times. Podiatry consulted for mgmt of right great toe cellulitis w/ possible underlying osteomyelitis of distal phalanx. MRI confirmed OM of distal phalanx of R great toe. Percutaneous bone biopsy performed at bedside 4/19.     P:   Continue IV abx   pain control as needed  Culture swab growing MSSA, Staph lugdunensis  F/u bone cx and path of distal phalanx   F/u ID recs  WBAT to right heel in surgical shoe  Podiatry following       Discharge Recommendations:   IV antibiotics per ID recs for treatment of osteomyelitis via LUE PICC line.   Cleanse right foot daily, can apply band-aide over incision to tip of right great toe.     Pt can follow-up on outpatient basis w/ any podiatrist at the following facility:   [facility] ([facility])  [facility] Location   [address] [place] [address], [address]  [place] [address] [phone]; [phone]  [email or web address]   69yo F with PMHx DM2 (reports last A1c of 7%), HTN, HLD, COPD, s/p back surgery (Nov 2020) presents to [facility] ED for recurrent right great toe pain. Pt has failed PO abx three times. Podiatry consulted for mgmt of right great toe cellulitis w/ possible underlying osteomyelitis of distal phalanx. MRI confirmed OM of distal phalanx of R great toe. Percutaneous bone biopsy performed at bedside 4/19.     P:   Continue IV abx   pain control as needed  Culture swab growing MSSA, Staph lugdunensis  F/u bone cx and path of distal phalanx   F/u ID recs  WBAT to right heel in surgical shoe  Podiatry following       Discharge Recommendations:   IV antibiotics per ID recs for treatment of osteomyelitis via LUE PICC line.   Cleanse right foot daily, can apply band-aide over incision to tip of right great toe.     Pt can follow-up with her former podiatrist (Dr. Keesha Pradhan) or any of the podiatrist at [facility]:   [facility]   Dr. Carolyn Pradhan   [address] [place] [id number]   [phone]    [facility] ([facility])  [facility] Location   [address], [address]  [place] [address] [phone]; [phone]  [email or web address]

## 2021-04-21 NOTE — PROGRESS NOTE ADULT - ATTENDING COMMENTS
As above.  Discussed plan with Mr. Daley and her daughter at length.  They are agreeable to plan.  Please recall if further ID input is desired - team 1.

## 2021-04-21 NOTE — DISCHARGE NOTE PROVIDER - NSDCFUADDAPPT_GEN_ALL_CORE_FT
Dr. Israel Meek is aware that you were in the hospital and his office will reach out to you to schedule a follow up appointment  Dr. Israel Meek is aware that you were in the hospital and his office will reach out to you to schedule a follow up appointment     You have an infectious disease specialist follow up appointment with Dr. Kathy Dominguez [address] [place] [address], [address] [place] [address]. Phone number [phone]. Dr. Israel Meek is aware that you were in the hospital and his office will reach out to you to schedule a follow up appointment     You have an infectious disease specialist follow up appointment with Dr. Kathy Dominguez [address] [place] [address], 4th [place] [address]. Phone number [phone].    You have decided to find a podiatrist that you feel most comfortable. Please ensure that you see your podiatrist within 1 week of discharge and every week for the first 3 weeks

## 2021-04-21 NOTE — PROGRESS NOTE ADULT - PROVIDER SPECIALTY LIST ADULT
Internal Medicine
Internal Medicine
Infectious Disease
Internal Medicine
Podiatry
Infectious Disease
Infectious Disease
Podiatry
Podiatry

## 2021-04-21 NOTE — PROGRESS NOTE ADULT - NSICDXPILOT_GEN_ALL_CORE
[place]

## 2021-04-21 NOTE — DISCHARGE NOTE PROVIDER - PROVIDER TOKENS
FREE:[LAST:[abbe],FIRST:[darien],PHONE:[phone]],FAX:[(   )    -],ESTABLISHEDPATIENT:[T]] PROVIDER:[TOKEN:[[id number]],SCHEDULEDAPPT:[05/07/2021],SCHEDULEDAPPTTIME:[10:40 AM]],FREE:[LAST:[abbe],FIRST:[darien],PHONE:[phone]],FAX:[(   )    -],ESTABLISHEDPATIENT:[T]]

## 2021-04-21 NOTE — DISCHARGE NOTE PROVIDER - NSDCCPCAREPLAN_GEN_ALL_CORE_FT
PRINCIPAL DISCHARGE DIAGNOSIS  Diagnosis: Foot osteomyelitis, right  Assessment and Plan of Treatment: You were found to have   You have an infectious disease specialist follow up appointment with Dr. Kathy Dominguez [address] [place] [address], 4th [place] [address]. Phone number [phone]       PRINCIPAL DISCHARGE DIAGNOSIS  Diagnosis: Foot osteomyelitis, right  Assessment and Plan of Treatment: You were found to have   You have an infectious disease specialist follow up appointment with Dr. Kathy Dominguez [address] [place] [address], 4th [place] [address]. Phone number [phone]  You have decided to find a podiatrist that you feel most comfortable. Please ensure that you see your podiatrist within 1 week of discharge and every week for the first 3 weeks  Dr. Israel Meek is aware that you were in the hospital and his office will reach out to you to schedule a follow up appointment       PRINCIPAL DISCHARGE DIAGNOSIS  Diagnosis: Foot osteomyelitis, right  Assessment and Plan of Treatment: You were found to have an infection in the toe of your bone. This was diagnosed by taking a piece of your bone. We tested what antiobiotics work against your infection and you will use the IV medication Naficillin everday until June 2nd. For pain management we have given you Percocet to take every 6 hours maximum 4 tablets a day for severe pain that you feel. We have prescribed you 20 tablets total.   You have an infectious disease specialist follow up appointment with Dr. Kathy Dominguez [address] [place] [address], 4th [place] [address]. Phone number [phone]  You have decided to find a podiatrist that you feel most comfortable. Please ensure that you see your podiatrist within 1 week of discharge and every week for the first 3 weeks  Dr. Israel Meek is aware that you were in the hospital and his office will reach out to you to schedule a follow up appointment

## 2021-04-21 NOTE — PROGRESS NOTE ADULT - ASSESSMENT
70F with PMH of HTN, DM2, HLD and COPD with osteomyelitis R hallux with intra-osseous abscess. She has been afebrile, initially with mild leukocytosis but without L shift, mildly elevated CRP and nl ESR. She received 2 doses each of vanc and pip-tazo prior to bone biopsy. Antibiotics resumed post procedure. Surgical swab culture growing Staphylococcus aureus and Staphylococcus lugdunensis, both are widely susceptible.    Suggest:  - follow bone culture  - stop vancomycin  - start nafcillin 2g q4h, today is Day 1 of antibiotic course    Recommendations discussed with primary team. ID Team 1 will sign off. 70F with PMH of HTN, DM2, HLD and COPD with osteomyelitis R hallux with intra-osseous abscess. She has been afebrile, initially with mild leukocytosis but without L shift, mildly elevated CRP and nl ESR. She received 2 doses each of vanc and pip-tazo prior to bone biopsy. Antibiotics resumed post procedure. Surgical swab culture growing Staphylococcus aureus and Staphylococcus lugdunensis, both are widely susceptible.    Suggest:  - follow bone culture  - stop vancomycin  - start nafcillin 2g q4h, would plan for 6 week course (4/21- 6/2)  - weekly CBC, CMP, ESR and CRP while on IV antibiotics    Recommendations discussed with primary team. ID Team 1 will sign off.

## 2021-04-21 NOTE — DISCHARGE NOTE PROVIDER - NSDCMRMEDTOKEN_GEN_ALL_CORE_FT
atorvastatin: 40 milligram(s) orally once a day (at bedtime)  Basaglar KwikPen 100 units/mL subcutaneous solution: 30 unit(s) subcutaneous once a day (at bedtime)  Incruse Ellipta 62.5 mcg/inh inhalation powder: 1 puff(s) inhaled once a day  Multiple Vitamins oral tablet: 1 tab(s) orally once a day  raNITIdine 300 mg oral tablet: 1 tab(s) orally once a day (at bedtime)  Symbicort 160 mcg-4.5 mcg/inh inhalation aerosol: 2 puff(s) inhaled 2 times a day  valsartan 80 mg oral capsule: 1 cap(s) orally once a day   atorvastatin: 40 milligram(s) orally once a day (at bedtime)  Basaglar KwikPen 100 units/mL subcutaneous solution: 30 unit(s) subcutaneous once a day (at bedtime)  CBC, CMP weekly to be faxed to Dr. Kathy Dominguez [phone]: CBC CMP weekly to be faxed to Dr. Kathy Dominguez [phone]  Heparin 10 units/ml 3ml post infusion: Heparin 10units/mL 3mL post infusion daily  Incruse Ellipta 62.5 mcg/inh inhalation powder: 1 puff(s) inhaled once a day  Multiple Vitamins oral tablet: 1 tab(s) orally once a day  nafcillin 2 g/100 mL intravenous solution: 100 milliliter(s) intravenous every 4 hours x 42d, end date June 2nd  Normal Saline Flush 0.9% injectable solution: 10 milliliter(s) injectable 2 times a day  pre and post infusion   raNITIdine 300 mg oral tablet: 1 tab(s) orally once a day (at bedtime)  Symbicort 160 mcg-4.5 mcg/inh inhalation aerosol: 2 puff(s) inhaled 2 times a day  valsartan 80 mg oral capsule: 1 cap(s) orally once a day   atorvastatin: 40 milligram(s) orally once a day (at bedtime)  Basaglar KwikPen 100 units/mL subcutaneous solution: 30 unit(s) subcutaneous once a day (at bedtime)  CBC, CMP weekly to be faxed to Dr. Kathy Dominguez [phone]: CBC CMP weekly to be faxed to Dr. Kathy Dominguez [phone]  Heparin 10 units/ml 3ml post infusion: Heparin 10units/mL 3mL post infusion daily  Incruse Ellipta 62.5 mcg/inh inhalation powder: 1 puff(s) inhaled once a day  Multiple Vitamins oral tablet: 1 tab(s) orally once a day  nafcillin 2 g/100 mL intravenous solution: 100 milliliter(s) intravenous every 4 hours x 42d, end date June 2nd  Normal Saline Flush 0.9% injectable solution: 10 milliliter(s) injectable 2 times a day  pre and post infusion   Percocet 5 mg-325 mg oral tablet: 1 tab(s) orally every 6 hours as needed for severe pain MDD:4 tabs max daily  raNITIdine 300 mg oral tablet: 1 tab(s) orally once a day (at bedtime)  Symbicort 160 mcg-4.5 mcg/inh inhalation aerosol: 2 puff(s) inhaled 2 times a day  valsartan 80 mg oral capsule: 1 cap(s) orally once a day

## 2021-04-21 NOTE — DISCHARGE NOTE NURSING/CASE MANAGEMENT/SOCIAL WORK - NSDCFUADDAPPT_GEN_ALL_CORE_FT
Dr. Israel Meek is aware that you were in the hospital and his office will reach out to you to schedule a follow up appointment     You have an infectious disease specialist follow up appointment with Dr. Kathy Dominguez [address] [place] [address], 4th [place] [address]. Phone number [phone].    You have decided to find a podiatrist that you feel most comfortable. Please ensure that you see your podiatrist within 1 week of discharge and every week for the first 3 weeks

## 2021-04-21 NOTE — DISCHARGE NOTE PROVIDER - HOSPITAL COURSE
DISCHARGE NOTE DO NOT DELETE  70 F with hx of HTN, DM (reports well controlled), HLD who presents for R great toe pain persistent despite outpatient abx concern for cellulitis with possible underlying oseto      #Osteomyelitis of right foot, unspecified type.  Plan: - XR with chronic osteo possible acute on chronic   - MRI showing osteomyelitis of right hallux, likely intraosseus abscess  - Bone Bx Cx showing staph lugdunesis and MSSA that is pansensitive  - Naficillin 2g q4hr for 42 Days   - f/u out pt ID and podiatry    # Diabetes with long term insulin use, complicated by diabetic foot infection  On Basaglar 30 at home  - inpatient lantus 24U  - discharged on home dose     #Hypertension  - c/w home valsartan.      #COPD (chronic obstructive pulmonary disease).    - c/w home symbicort and interchange for incruse ellipta.     New Meds: Naficillin 2g q4hr 42 days  Labs to follow: cbc, bmp  Exams to follow: foot exam DISCHARGE NOTE DO NOT DELETE  70 F with hx of HTN, DM (reports well controlled), HLD who presents for R great toe pain persistent despite outpatient abx concern for cellulitis with possible underlying oseto      #Osteomyelitis of right foot, unspecified type.  Plan: - XR with chronic osteo possible acute on chronic   - MRI showing osteomyelitis of right hallux, likely intraosseus abscess  - Bone Bx Cx showing staph lugdunesis and MSSA that is pansensitive  - Naficillin 2g q4hr for 42 Days   - f/u out pt ID and podiatry    # Diabetes with long term insulin use, complicated by diabetic foot infection  On Basaglar 30 at home  - inpatient lantus 24U  - discharged on home dose     #Hypertension  - c/w home valsartan.      #COPD (chronic obstructive pulmonary disease).    - c/w home symbicort and interchange for incruse ellipta.     New Meds: Naficillin 2g q4hr 42 days  Labs to follow: cbc, bmp  Exams to follow: foot exam    ****************************************************************************  I have read and agree with the resident Discharge Note above. Patient seen and discussed with resident team on the day of discharge.     Briefly,    70 year old woman with HTN, DM with long term insulin use, (complicated by hyperglycemia,diabetic foot infection), who presented with right great toe pain  Found to have osteomyelitis. Bone biopsy culture grew staph lugdunensis and MSSA.   Stable for discharge home on long course of nafcillin.     Attending exam on day of discharge:     Gen: Reclining in bed at 30 degree angle at time of exam  HEENT: NCAT, MMM, clear OP  Neck: supple, trachea at midline  CV: RRR, +S1/S2  Pulm: adequate respiratory effort, no increased work of breathing  Abd: soft, NTND  Skin: warm and dry, no new rashes vs prior report  Ext: WWP; right foot wrapped in clean, dry gauze.   Neuro: AOx3, no gross focal neurological deficits  Psych: affect and behavior appropriate; pleasant   : No grey  No restraints    I was physically present for the evaluation and management services provided. I agree with the included history, physical, and plan which I reviewed and edited where appropriate. I spent > 30 minutes with the patient and the patient's family on direct patient care and discharge planning with more than 50% of the visit spent on counseling and/or coordination of care.

## 2021-04-21 NOTE — PROGRESS NOTE ADULT - SUBJECTIVE AND OBJECTIVE BOX
Patient is a 70y old  Female who presents with a chief complaint of     INTERVAL HPI/ OVERNIGHT EVENTS: KIRAN O/N. Pt endorses continued pain to R toe.       LABS                        11.5   9.81  )-----------( 213      ( 20 Apr 2021 06:44 )             35.4     04-20    138  |  102  |  15  ----------------------------<  116<H>  4.1   |  27  |  0.76    Ca    9.3      20 Apr 2021 06:44  Phos  3.3     04-20  Mg     2.2     04-20          ICU Vital Signs Last 24 Hrs  T(C): 36.7 (21 Apr 2021 06:34), Max: 37.1 (20 Apr 2021 18:15)  T(F): 98 (21 Apr 2021 06:34), Max: 98.7 (20 Apr 2021 18:15)  HR: 71 (21 Apr 2021 06:34) (71 - 86)  BP: 104/61 (21 Apr 2021 06:34) (104/61 - 138/76)  BP(mean): --  ABP: --  ABP(mean): --  RR: 17 (21 Apr 2021 06:34) (17 - 18)  SpO2: 95% (21 Apr 2021 06:34) (95% - 96%)      RADIOLOGY  < from: MR Foot w/wo IV Cont, Right (04.17.21 @ 08:56) >    IMPRESSION:  1. Osteomyelitis of the first distal phalanx with osseous destruction and intraosseous abscess.  2. Mild synovitis of the first interphalangeal joint without osseous changes of the proximal phalanx.  3. Findings discussed withthe patient's nurse Adele on 4/17/2021 9:32 AM.    < end of copied text >  < from: Xray Toes, Right Foot (04.17.21 @ 00:00) >  IMPRESSION: Findings consistent with osteomyelitis involving distal phalanx first toe right foot    < end of copied text >        MICROBIOLOGY    Culture - Surgical Swab (collected 19 Apr 2021 13:14)  Source: .Surgical Swab R foot great toe  Gram Stain (19 Apr 2021 19:14):    Rare Gram positive cocci in pairs    Few WBC's        PHYSICAL EXAM  Lower Extremity Focused  Vasc: DP/PT 2/4 b/l. CFT brisk x 10. Increased warmth to R great toe. Severe edema to R great toe.   Derm: Erythema and ecchymosis to R great toe. Dystrophic right great toe nail. 1cm incision to distal tip of R great toe corresponding to site of percutaneous bone biopsy; no active drainage from incision.   Neuro: Sensation intact b/l  MSK: Pain w/ light touch to any aspect of the R great toe. Pt able to mobilize other digits of R foot but R great toe is stiff, unable to DF/PF 2/2 pain.    MSK:

## 2021-04-21 NOTE — DISCHARGE NOTE NURSING/CASE MANAGEMENT/SOCIAL WORK - PATIENT PORTAL LINK FT
You can access the FollowMyHealth Patient Portal offered by [facility] by registering at the following website: [email or web address]. By joining [employer]’s FollowMyHealth portal, you will also be able to view your health information using other applications (apps) compatible with our system.

## 2021-04-21 NOTE — PROGRESS NOTE ADULT - SUBJECTIVE AND OBJECTIVE BOX
SUBJECTIVE / INTERVAL HPI: Patient seen and examined at the bedside this morning. Patient afebrile overnight. She continues to have pain in the right foot s/p bone biopsy on 4/19.     VITAL SIGNS:  Vital Signs Last 24 Hrs  T(C): 36.7 (21 Apr 2021 06:34), Max: 37.1 (20 Apr 2021 18:15)  T(F): 98 (21 Apr 2021 06:34), Max: 98.7 (20 Apr 2021 18:15)  HR: 71 (21 Apr 2021 06:34) (71 - 86)  BP: 104/61 (21 Apr 2021 06:34) (104/61 - 138/76)  RR: 17 (21 Apr 2021 06:34) (17 - 18)  SpO2: 95% (21 Apr 2021 06:34) (95% - 96%)    PHYSICAL EXAM:    General: in NAD, resting comfortably in bed  HEENT: PERRL, anicteric sclera; MMM  Neck: supple  Cardiovascular: +S1/S2, RRR  Respiratory: clear to auscultation B/L  Gastrointestinal: soft, NT/ND  Extremities: WWP; no edema; right foot dressing clean, dry, and intact  Vascular: 2+ radial pulses B/L  Neurological: AAOx3; no focal deficits    MEDICATIONS:  MEDICATIONS  (STANDING):  atorvastatin 40 milliGRAM(s) Oral at bedtime  budesonide 160 MICROgram(s)/formoterol 4.5 MICROgram(s) Inhaler 2 Puff(s) Inhalation two times a day  chlorhexidine 2% Cloths 1 Application(s) Topical <User Schedule>  enoxaparin Injectable 40 milliGRAM(s) SubCutaneous every 24 hours  insulin glargine Injectable (LANTUS) 24 Unit(s) SubCutaneous at bedtime  insulin lispro (ADMELOG) corrective regimen sliding scale   SubCutaneous Before meals and at bedtime  nicotine -   7 mG/24Hr(s) Patch 1 patch Transdermal daily  piperacillin/tazobactam IVPB.. 4.5 Gram(s) IV Intermittent every 6 hours  polyethylene glycol 3350 17 Gram(s) Oral every 24 hours  senna 2 Tablet(s) Oral every 24 hours  tiotropium 18 MICROgram(s) Capsule 1 Capsule(s) Inhalation daily  valsartan 80 milliGRAM(s) Oral daily  vancomycin  IVPB 1250 milliGRAM(s) IV Intermittent every 12 hours    MEDICATIONS  (PRN):  acetaminophen   Tablet .. 650 milliGRAM(s) Oral every 6 hours PRN Temp greater or equal to 38C (100.4F), Mild Pain (1 - 3)  morphine  - Injectable 2 milliGRAM(s) IV Push every 6 hours PRN Severe Pain (7 - 10)  oxyCODONE    IR 5 milliGRAM(s) Oral every 6 hours PRN Moderate Pain (4 - 6)  sodium chloride 0.9% lock flush 10 milliLiter(s) IV Push every 1 hour PRN Pre/post blood products, medications, blood draw, and to maintain line patency        ALLERGIES:  Allergies    No Known Allergies    Intolerances        LABS:                         11.5   9.81  )-----------( 213      ( 20 Apr 2021 06:44 )             35.4     04-20    138  |  102  |  15  ----------------------------<  116<H>  4.1   |  27  |  0.76    Ca    9.3      20 Apr 2021 06:44  Phos  3.3     04-20  Mg     2.2     04-20        MICROBIOLOGY:  Culture - Tissue (04.19.21 @ 13:15)   Specimen Source: .Tissue Other, R foot distal phalanx bone   Culture in progress     Culture - Surgical Swab (04.19.21 @ 13:14)   - Cefazolin: S <=4   - Clindamycin: S <=0.25   - Erythromycin: S <=0.25   - Linezolid: S 1   - Linezolid: S 2   - Oxacillin: S 0.5   - RIF- Rifampin: S <=1 Should not be used as monotherapy   - Trimethoprim/Sulfamethoxazole: S <=0.5/9.5   - Vancomycin: S 1   Specimen Source: .Surgical Swab R foot great toe   Culture Results:   Moderate Staphylococcus aureus   Moderate Staphylococcus lugdunensis       Culture - Blood (04.17.21 @ 03:42)   No growth at 3 days.       RADIOLOGY & ADDITIONAL TESTS: Reviewed.  < from: Xray Foot AP + Lateral + Oblique, Left (04.18.21 @ 11:30) >  impression: No radiographic findings to suggest the presence of acute osteomyelitis. No acute fracture or dislocation. Hallux valgus, bunion/soft tissue swelling. Calcaneal Achilles tendon enthesophyte  < end of copied text >    < from: MR Foot w/wo IV Cont, Right (04.17.21 @ 08:56) >  IMPRESSION:  1. Osteomyelitis of the first distal phalanx with osseous destruction and intraosseous abscess.  2. Mild synovitis of the first interphalangeal joint without osseous changes of the proximal phalanx.  < end of copied text >    < from: Xray Toes, Right Foot (04.17.21 @ 00:00) >  IMPRESSION: Findings consistent with osteomyelitis involving distal phalanx first toe right foot  < end of copied text >

## 2021-04-21 NOTE — DISCHARGE NOTE PROVIDER - CARE PROVIDERS DIRECT ADDRESSES
,DirectAddress_Unknown ,[email or web address],DirectAddress_Unknown

## 2021-04-21 NOTE — DISCHARGE NOTE PROVIDER - CARE PROVIDER_API CALL
darien [email or web address]  Phone: [phone]  Fax: (   )    -  Established Patient  Follow Up Time:    Kathy Dominguez)  Infectious Disease; Internal Medicine  [address] [place] [address]  Phone: [phone]  Fax: [phone]  Scheduled Appointment: 05/07/2021 10:40 AM    darien mcadams  Phone: [phone]  Fax: (   )    -  Established Patient  Follow Up Time:

## 2021-04-22 LAB
CULTURE RESULTS: SIGNIFICANT CHANGE UP
ORGANISM # SPEC MICROSCOPIC CNT: SIGNIFICANT CHANGE UP
SPECIMEN SOURCE: SIGNIFICANT CHANGE UP

## 2021-04-23 LAB
-  CEFAZOLIN: SIGNIFICANT CHANGE UP
-  CLINDAMYCIN: SIGNIFICANT CHANGE UP
-  ERYTHROMYCIN: SIGNIFICANT CHANGE UP
-  LINEZOLID: SIGNIFICANT CHANGE UP
-  OXACILLIN: SIGNIFICANT CHANGE UP
-  RIFAMPIN: SIGNIFICANT CHANGE UP
-  TRIMETHOPRIM/SULFAMETHOXAZOLE: SIGNIFICANT CHANGE UP
-  VANCOMYCIN: SIGNIFICANT CHANGE UP
CULTURE RESULTS: SIGNIFICANT CHANGE UP
METHOD TYPE: SIGNIFICANT CHANGE UP
ORGANISM # SPEC MICROSCOPIC CNT: SIGNIFICANT CHANGE UP
ORGANISM # SPEC MICROSCOPIC CNT: SIGNIFICANT CHANGE UP
SPECIMEN SOURCE: SIGNIFICANT CHANGE UP

## 2021-04-24 DIAGNOSIS — M86.671 OTHER CHRONIC OSTEOMYELITIS, RIGHT ANKLE AND FOOT: ICD-10-CM

## 2021-04-24 DIAGNOSIS — E11.69 TYPE 2 DIABETES MELLITUS WITH OTHER SPECIFIED COMPLICATION: ICD-10-CM

## 2021-04-24 DIAGNOSIS — L02.611 CUTANEOUS ABSCESS OF RIGHT FOOT: ICD-10-CM

## 2021-04-24 DIAGNOSIS — F17.210 NICOTINE DEPENDENCE, CIGARETTES, UNCOMPLICATED: ICD-10-CM

## 2021-04-24 DIAGNOSIS — E11.51 TYPE 2 DIABETES MELLITUS WITH DIABETIC PERIPHERAL ANGIOPATHY WITHOUT GANGRENE: ICD-10-CM

## 2021-04-24 DIAGNOSIS — J44.9 CHRONIC OBSTRUCTIVE PULMONARY DISEASE, UNSPECIFIED: ICD-10-CM

## 2021-04-24 DIAGNOSIS — M86.171 OTHER ACUTE OSTEOMYELITIS, RIGHT ANKLE AND FOOT: ICD-10-CM

## 2021-04-24 DIAGNOSIS — L03.031 CELLULITIS OF RIGHT TOE: ICD-10-CM

## 2021-04-24 DIAGNOSIS — Z79.51 LONG TERM (CURRENT) USE OF INHALED STEROIDS: ICD-10-CM

## 2021-05-07 ENCOUNTER — NON-APPOINTMENT (OUTPATIENT)
Age: 71
End: 2021-05-07

## 2021-05-07 ENCOUNTER — APPOINTMENT (OUTPATIENT)
Dept: INFECTIOUS DISEASE | Facility: CLINIC | Age: 71
End: 2021-05-07
Payer: MEDICAID

## 2021-05-07 VITALS
DIASTOLIC BLOOD PRESSURE: 76 MMHG | WEIGHT: 137.13 LBS | BODY MASS INDEX: 26.92 KG/M2 | HEIGHT: 60 IN | HEART RATE: 73 BPM | SYSTOLIC BLOOD PRESSURE: 163 MMHG | TEMPERATURE: 98.3 F | OXYGEN SATURATION: 98 %

## 2021-05-07 PROCEDURE — 99214 OFFICE O/P EST MOD 30 MIN: CPT

## 2021-05-07 PROCEDURE — 99072 ADDL SUPL MATRL&STAF TM PHE: CPT

## 2021-05-07 RX ORDER — MECLIZINE HYDROCHLORIDE 25 MG/1
25 TABLET ORAL
Refills: 0 | Status: COMPLETED | COMMUNITY
End: 2021-05-07

## 2021-05-07 RX ORDER — NEOMYCIN SULFATE, POLYMYXIN B SULFATE, HYDROCORTISONE 3.5; 10000; 1 MG/ML; [USP'U]/ML; MG/ML
1 SOLUTION/ DROPS AURICULAR (OTIC)
Qty: 1 | Refills: 6 | Status: COMPLETED | COMMUNITY
Start: 2020-01-21 | End: 2021-05-07

## 2021-05-07 NOTE — ASSESSMENT
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  She is on 6 week course of nafcillin. R foot swelling and pain improving.\par Plan:\par - Continue nafcillin 2 g IV q 4 h x 6 weeks, end date 6/2\par - Weekly CBC, CMP, ESR, CRP drawn by infusion company\par Follow up in 2 weeks.

## 2021-05-07 NOTE — HISTORY OF PRESENT ILLNESS
[FreeTextEntry1] : 70 year old female with HTN, DM2, HLD, COPD, back surgery X 5 admitted 4/17-4/21 with OM R hallux.  She had ingrown nail removal during podiatry visit on 2/, was given cefadroxil x 7 days.  Came to ED on 2/17, was prescribed TMP/SX x 7 days.  Xray showed subchondral lucency with cortical step off at medial aspect of distal phalanx – may represent OM.  She returned to ED on 3/31 for recurrent R great toe pain.  XR was unchanged and she was referred for outpatient removal of nail.  After removal, she began having worsening pain and swelling.  On 4/16, she saw a small amount of watery drainage.  She presented to ED on 4/17.  She received vanc and pip-tazo x 2 doses. MRI showed osseous destruction and marrow replacement of 1st distal phalanx with intraosseous abscess involving diaphysis and tuft, mild synovitis of 1st interphalangeal joint without osseous changes of proximal phalanx.   Vanc/pip-tazo were discontinued pending bone biopsy.  Bone biopsy was done 4/19.  Vanc and pip-tazo resumed. Surgical swab grew Staph aureus and Staph lugdunensis. Pip-tazo discontinued. She was discharged on 4/21 to continue nafcillin 2 g IV q 4 h x 6 weeks (4/21 - 6/2).  She is tolerating medication.  R foot pain now 6/10, was 10/10 previously.

## 2021-05-07 NOTE — PHYSICAL EXAM
[General Appearance - Alert] : alert [General Appearance - Well-Appearing] : healthy appearing [Sclera] : the sclera and conjunctiva were normal [Outer Ear] : the ears and nose were normal in appearance [Oropharynx] : the oropharynx was normal with no thrush [Examination Of The Oral Cavity] : the lips and gums were normal [] : the neck was supple [Auscultation Breath Sounds / Voice Sounds] : lungs were clear to auscultation bilaterally [Heart Rate And Rhythm] : heart rate was normal and rhythm regular [Heart Sounds] : normal S1 and S2 [Murmurs] : no murmurs [Edema] : there was no peripheral edema [Bowel Sounds] : normal bowel sounds [Abdomen Soft] : soft [Abdomen Tenderness] : non-tender [Costovertebral Angle Tenderness] : no CVA tenderness [FreeTextEntry1] : R hallux edematous and warm but markedly improved since d/c, is able to move toe more.  LUE PICC exit site dressed

## 2021-05-07 NOTE — REVIEW OF SYSTEMS
[Nasal Discharge] : nasal discharge [Cough] : cough [As Noted in HPI] : as noted in HPI [Fever] : no fever [Chills] : no chills [Eye Pain] : no eye pain [Eyesight Problems] : no eyesight problems [Sore Throat] : no sore throat [Chest Pain] : no chest pain [Shortness Of Breath] : no shortness of breath [Abdominal Pain] : no abdominal pain [Vomiting] : no vomiting [Skin Lesions] : no skin lesions

## 2021-05-21 ENCOUNTER — APPOINTMENT (OUTPATIENT)
Dept: INFECTIOUS DISEASE | Facility: CLINIC | Age: 71
End: 2021-05-21
Payer: MEDICAID

## 2021-05-21 VITALS
SYSTOLIC BLOOD PRESSURE: 164 MMHG | HEIGHT: 60 IN | HEART RATE: 74 BPM | WEIGHT: 137.38 LBS | OXYGEN SATURATION: 99 % | DIASTOLIC BLOOD PRESSURE: 84 MMHG | TEMPERATURE: 97.9 F | BODY MASS INDEX: 26.97 KG/M2

## 2021-05-21 PROCEDURE — 99214 OFFICE O/P EST MOD 30 MIN: CPT | Mod: 25

## 2021-05-21 NOTE — HISTORY OF PRESENT ILLNESS
[FreeTextEntry1] : 70 year old female with HTN, DM2, HLD, COPD, back surgery X 5 admitted 4/17-4/21 with OM R hallux. She had ingrown nail s/p removal.  She then developed worsening pain and swelling. Found to have OM R hallux. Bone biopsy was done 4/19. Surgical swab grew Staph aureus and Staph lugdunensis. She continues nafcillin 2 g IV q 4 h x 6 weeks (4/21 - 6/2).  She denies R toe pain.  She is seeing Dr. Carolyn Pradhan (podiatry) at [facility].  She reports increased urinary frequency and burning with urination.

## 2021-05-21 NOTE — PHYSICAL EXAM
[General Appearance - Alert] : alert [General Appearance - Well-Appearing] : healthy appearing [Sclera] : the sclera and conjunctiva were normal [Outer Ear] : the ears and nose were normal in appearance [Examination Of The Oral Cavity] : the lips and gums were normal [Oropharynx] : the oropharynx was normal with no thrush [] : the neck was supple [Auscultation Breath Sounds / Voice Sounds] : lungs were clear to auscultation bilaterally [Heart Rate And Rhythm] : heart rate was normal and rhythm regular [Heart Sounds] : normal S1 and S2 [Murmurs] : no murmurs [Edema] : there was no peripheral edema [Bowel Sounds] : normal bowel sounds [Abdomen Soft] : soft [Costovertebral Angle Tenderness] : no CVA tenderness [FreeTextEntry1] : R hallux distal phalynx edematous, nail dysmorphic, nontender, no warmth.  LUE PICC exit site dressed

## 2021-05-21 NOTE — REVIEW OF SYSTEMS
[Cough] : cough [As Noted in HPI] : as noted in HPI [Dizziness] : dizziness [Fever] : no fever [Chills] : no chills [Eye Pain] : no eye pain [Eyesight Problems] : no eyesight problems [Nasal Discharge] : no nasal discharge [Sore Throat] : no sore throat [Chest Pain] : no chest pain [Palpitations] : no palpitations [Abdominal Pain] : no abdominal pain [Vomiting] : no vomiting [Skin Lesions] : no skin lesions [Easy Bleeding] : no tendency for easy bleeding [Easy Bruising] : no tendency for easy bruising

## 2021-05-21 NOTE — ASSESSMENT
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  She is on 6 week course of nafcillin. R foot swelling and pain improving.  Inflammatory markers now normal.  She has dysuria, frequency and suprapubic tenderness to palpation - will evaluate for cystitis.\par Plan:\par - Continue nafcillin 2 g IV q 4 h x 6 weeks, end date 6/2\par - Weekly CBC, CMP, ESR, CRP drawn by infusion company\par - F/u with podiatry as planned\par - UA with micro, urine culture - sent\par Follow up in 2 weeks.

## 2021-05-27 ENCOUNTER — NON-APPOINTMENT (OUTPATIENT)
Age: 71
End: 2021-05-27

## 2021-05-27 LAB
APPEARANCE: CLEAR
BACTERIA UR CULT: NORMAL
BACTERIA: NEGATIVE
BILIRUBIN URINE: NEGATIVE
BLOOD URINE: NEGATIVE
COLOR: NORMAL
GLUCOSE QUALITATIVE U: NEGATIVE
HYALINE CASTS: 1 /LPF
KETONES URINE: NEGATIVE
LEUKOCYTE ESTERASE URINE: NEGATIVE
MICROSCOPIC-UA: NORMAL
NITRITE URINE: NEGATIVE
PH URINE: 7.5
PROTEIN URINE: NEGATIVE
RED BLOOD CELLS URINE: 1 /HPF
SPECIFIC GRAVITY URINE: 1.02
SQUAMOUS EPITHELIAL CELLS: 2 /HPF
UROBILINOGEN URINE: NORMAL
WHITE BLOOD CELLS URINE: 10 /HPF

## 2021-06-04 ENCOUNTER — APPOINTMENT (OUTPATIENT)
Dept: INFECTIOUS DISEASE | Facility: CLINIC | Age: 71
End: 2021-06-04
Payer: MEDICAID

## 2021-06-04 VITALS
HEIGHT: 60 IN | TEMPERATURE: 97.9 F | OXYGEN SATURATION: 98 % | HEART RATE: 73 BPM | SYSTOLIC BLOOD PRESSURE: 150 MMHG | RESPIRATION RATE: 15 BRPM | DIASTOLIC BLOOD PRESSURE: 83 MMHG | WEIGHT: 134 LBS | BODY MASS INDEX: 26.31 KG/M2

## 2021-06-04 PROCEDURE — 99214 OFFICE O/P EST MOD 30 MIN: CPT | Mod: 25

## 2021-06-04 NOTE — ASSESSMENT
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  She completed 6 week course of nafcillin. R foot swelling and pain improved.  Inflammatory markers now normal.  \par Plan:\par - Monitor off antibiotics\par Follow up  on 6/25.

## 2021-06-04 NOTE — PHYSICAL EXAM
[General Appearance - Alert] : alert [General Appearance - Well-Appearing] : healthy appearing [Sclera] : the sclera and conjunctiva were normal [Outer Ear] : the ears and nose were normal in appearance [Examination Of The Oral Cavity] : the lips and gums were normal [Oropharynx] : the oropharynx was normal with no thrush [Auscultation Breath Sounds / Voice Sounds] : lungs were clear to auscultation bilaterally [Heart Rate And Rhythm] : heart rate was normal and rhythm regular [Heart Sounds] : normal S1 and S2 [Murmurs] : no murmurs [Edema] : there was no peripheral edema [Bowel Sounds] : normal bowel sounds [Abdomen Soft] : soft [Abdomen Tenderness] : non-tender [Costovertebral Angle Tenderness] : no CVA tenderness [] : no rash [FreeTextEntry1] : Ecchymosis L inner thigh - evolving

## 2021-06-04 NOTE — HISTORY OF PRESENT ILLNESS
[FreeTextEntry1] : 70 year old female with HTN, DM2, HLD, COPD, back surgery X 5 admitted 4/17-4/21 with OM R hallux. She had ingrown nail s/p removal. She then developed worsening pain and swelling. Found to have OM R hallux. Bone biopsy was done 4/19. Surgical swab grew Staph aureus and Staph lugdunensis. She was treated with nafcillin 2 g IV q 4 h x 6 weeks (4/21 - 6/2).  PICC was removed on 6/3. Pain in R toe is improved. Denies fever/chills.

## 2021-06-04 NOTE — REVIEW OF SYSTEMS
[Easy Bruising] : a tendency for easy bruising [Fever] : no fever [Chills] : no chills [Eye Pain] : no eye pain [Eyesight Problems] : no eyesight problems [Nasal Discharge] : no nasal discharge [Sore Throat] : no sore throat [Chest Pain] : no chest pain [Palpitations] : no palpitations [Shortness Of Breath] : no shortness of breath [Cough] : no cough [Abdominal Pain] : no abdominal pain [Vomiting] : no vomiting [Dysuria] : no dysuria [Joint Pain] : no joint pain [Joint Swelling] : no joint swelling [Skin Lesions] : no skin lesions [Confused] : no confusion [Suicidal] : not suicidal [de-identified] : Large bruise on inner thigh - no h/o trauma, now resolving

## 2021-06-25 ENCOUNTER — NON-APPOINTMENT (OUTPATIENT)
Age: 71
End: 2021-06-25

## 2021-06-29 ENCOUNTER — APPOINTMENT (OUTPATIENT)
Dept: INFECTIOUS DISEASE | Facility: CLINIC | Age: 71
End: 2021-06-29
Payer: MEDICAID

## 2021-06-29 VITALS
DIASTOLIC BLOOD PRESSURE: 71 MMHG | HEIGHT: 60 IN | WEIGHT: 132.5 LBS | OXYGEN SATURATION: 99 % | SYSTOLIC BLOOD PRESSURE: 116 MMHG | BODY MASS INDEX: 26.01 KG/M2 | TEMPERATURE: 97.4 F | HEART RATE: 84 BPM

## 2021-06-29 PROCEDURE — 99213 OFFICE O/P EST LOW 20 MIN: CPT

## 2021-06-29 NOTE — REVIEW OF SYSTEMS
[As Noted in HPI] : as noted in HPI [Easy Bruising] : a tendency for easy bruising [Fever] : no fever [Eye Pain] : no eye pain [Chills] : no chills [Eyesight Problems] : no eyesight problems [Nasal Discharge] : no nasal discharge [Sore Throat] : no sore throat [Chest Pain] : no chest pain [Palpitations] : no palpitations [Shortness Of Breath] : no shortness of breath [Cough] : no cough [Abdominal Pain] : no abdominal pain [Vomiting] : no vomiting [Dysuria] : no dysuria [Skin Lesions] : no skin lesions [Confused] : no confusion [Suicidal] : not suicidal [de-identified] : Large bruise on inner thigh - no h/o trauma, now resolving

## 2021-06-29 NOTE — HISTORY OF PRESENT ILLNESS
[FreeTextEntry1] : 70 year old female with HTN, DM2, HLD, COPD, back surgery X 5 admitted 4/17-4/21 with OM R hallux. She had ingrown nail s/p removal. She then developed worsening pain and swelling. Found to have OM R hallux. Bone biopsy was done 4/19. Surgical swab grew Staph aureus and Staph lugdunensis. She was treated with nafcillin 2 g IV q 4 h x 6 weeks (4/21 - 6/2).  PICC was removed on 6/3. She had podiatry appointmnet on 6/24, exam WNL. That night her R foot swelled. She denies trauma or manipulation by podiatry. She returned to podiatry on 6/25 Xray of R foot and I&D done. She was started on Bactrim x 3 weeks. Symptoms have improved since starting Bactrim. She denies fever/chills.\par

## 2021-06-29 NOTE — PHYSICAL EXAM
[General Appearance - Alert] : alert [Sclera] : the sclera and conjunctiva were normal [PERRL With Normal Accommodation] : pupils were equal in size, round, reactive to light [Hearing Threshold Finger Rub Not Kendall] : hearing was normal [Oropharynx] : the oropharynx was normal with no thrush [Respiration, Rhythm And Depth] : normal respiratory rhythm and effort [Auscultation Breath Sounds / Voice Sounds] : lungs were clear to auscultation bilaterally [Heart Rate And Rhythm] : heart rate was normal and rhythm regular [Heart Sounds] : normal S1 and S2 [Edema] : there was no peripheral edema [Skin Color & Pigmentation] : normal skin color and pigmentation [] : no rash [FreeTextEntry1] : R hallux with ecchymosis and mild tenderness/swelling. No warmth or fluctuance. Decreased ROM at DIPJ.

## 2021-06-29 NOTE — ASSESSMENT
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  She completed 6 week course of nafcillin on 6/2. Now with recurrence of R hallux tenderness/swelling. Per patient's daughter a culture was not obtained during I&D. Previous isolates are susceptible to Bactrim.\par Plan:\par - Continue Bactrim 1 DS q 12 h\par - CBC, CMP drawn and sent from office\par - ESR, CRP obtained. Inflammatory markers had normalized while on therapy. \par - Obtain R foot Xray from podiatry. With OM, xray findings may lag however imaging will be helpful if grossly abnormal or findings are worsening. \par - Instructed to contact the office immediately if having worsening symptoms. She should present to ED if having fever/chills.\par Follow up  on 7/6. Will contact patient with results.

## 2021-06-30 LAB
ALBUMIN SERPL ELPH-MCNC: 4.7 G/DL
ALP BLD-CCNC: 91 U/L
ALT SERPL-CCNC: 16 U/L
ANION GAP SERPL CALC-SCNC: 8 MMOL/L
AST SERPL-CCNC: 18 U/L
BASOPHILS # BLD AUTO: 0.02 K/UL
BASOPHILS NFR BLD AUTO: 0.3 %
BILIRUB SERPL-MCNC: 0.3 MG/DL
BUN SERPL-MCNC: 17 MG/DL
CALCIUM SERPL-MCNC: 9.9 MG/DL
CHLORIDE SERPL-SCNC: 107 MMOL/L
CO2 SERPL-SCNC: 24 MMOL/L
CREAT SERPL-MCNC: 0.84 MG/DL
CRP SERPL-MCNC: <3 MG/L
EOSINOPHIL # BLD AUTO: 0.18 K/UL
EOSINOPHIL NFR BLD AUTO: 2.4 %
ERYTHROCYTE [SEDIMENTATION RATE] IN BLOOD BY WESTERGREN METHOD: 7 MM/HR
GLUCOSE SERPL-MCNC: 138 MG/DL
HCT VFR BLD CALC: 38.6 %
HGB BLD-MCNC: 12.8 G/DL
IMM GRANULOCYTES NFR BLD AUTO: 0.4 %
LYMPHOCYTES # BLD AUTO: 1.68 K/UL
LYMPHOCYTES NFR BLD AUTO: 22.6 %
MAN DIFF?: NORMAL
MCHC RBC-ENTMCNC: 32.1 PG
MCHC RBC-ENTMCNC: 33.2 GM/DL
MCV RBC AUTO: 96.7 FL
MONOCYTES # BLD AUTO: 0.46 K/UL
MONOCYTES NFR BLD AUTO: 6.2 %
NEUTROPHILS # BLD AUTO: 5.06 K/UL
NEUTROPHILS NFR BLD AUTO: 68.1 %
PLATELET # BLD AUTO: 211 K/UL
POTASSIUM SERPL-SCNC: 5 MMOL/L
PROT SERPL-MCNC: 7 G/DL
RBC # BLD: 3.99 M/UL
RBC # FLD: 13.8 %
SODIUM SERPL-SCNC: 139 MMOL/L
WBC # FLD AUTO: 7.43 K/UL

## 2021-07-06 ENCOUNTER — INPATIENT (INPATIENT)
Facility: HOSPITAL | Age: 71
LOS: 4 days | Discharge: HOME | DRG: 988 | End: 2021-07-11
Attending: INTERNAL MEDICINE | Admitting: HOSPITALIST
Payer: MEDICARE

## 2021-07-06 ENCOUNTER — RESULT REVIEW (OUTPATIENT)
Age: 71
End: 2021-07-06

## 2021-07-06 ENCOUNTER — APPOINTMENT (OUTPATIENT)
Dept: INFECTIOUS DISEASE | Facility: CLINIC | Age: 71
End: 2021-07-06
Payer: MEDICAID

## 2021-07-06 VITALS
TEMPERATURE: 97.2 F | HEIGHT: 60 IN | WEIGHT: 132.38 LBS | DIASTOLIC BLOOD PRESSURE: 70 MMHG | OXYGEN SATURATION: 97 % | HEART RATE: 83 BPM | SYSTOLIC BLOOD PRESSURE: 117 MMHG | BODY MASS INDEX: 25.99 KG/M2

## 2021-07-06 VITALS
DIASTOLIC BLOOD PRESSURE: 69 MMHG | RESPIRATION RATE: 16 BRPM | HEIGHT: 60 IN | OXYGEN SATURATION: 98 % | WEIGHT: 134.04 LBS | SYSTOLIC BLOOD PRESSURE: 112 MMHG | TEMPERATURE: 98 F | HEART RATE: 70 BPM

## 2021-07-06 DIAGNOSIS — M86.9 OSTEOMYELITIS, UNSPECIFIED: ICD-10-CM

## 2021-07-06 DIAGNOSIS — M79.674 PAIN IN RIGHT TOE(S): ICD-10-CM

## 2021-07-06 DIAGNOSIS — E78.00 PURE HYPERCHOLESTEROLEMIA, UNSPECIFIED: ICD-10-CM

## 2021-07-06 DIAGNOSIS — Z29.9 ENCOUNTER FOR PROPHYLACTIC MEASURES, UNSPECIFIED: ICD-10-CM

## 2021-07-06 DIAGNOSIS — Z98.890 OTHER SPECIFIED POSTPROCEDURAL STATES: Chronic | ICD-10-CM

## 2021-07-06 DIAGNOSIS — E11.9 TYPE 2 DIABETES MELLITUS WITHOUT COMPLICATIONS: ICD-10-CM

## 2021-07-06 DIAGNOSIS — J45.909 UNSPECIFIED ASTHMA, UNCOMPLICATED: ICD-10-CM

## 2021-07-06 DIAGNOSIS — I10 ESSENTIAL (PRIMARY) HYPERTENSION: ICD-10-CM

## 2021-07-06 LAB
ALBUMIN SERPL ELPH-MCNC: 4.6 G/DL — SIGNIFICANT CHANGE UP (ref 3.3–5)
ALP SERPL-CCNC: 87 U/L — SIGNIFICANT CHANGE UP (ref 40–120)
ALT FLD-CCNC: 21 U/L — SIGNIFICANT CHANGE UP (ref 10–45)
ANION GAP SERPL CALC-SCNC: 13 MMOL/L — SIGNIFICANT CHANGE UP (ref 5–17)
AST SERPL-CCNC: 27 U/L — SIGNIFICANT CHANGE UP (ref 10–40)
BASOPHILS # BLD AUTO: 0.03 K/UL — SIGNIFICANT CHANGE UP (ref 0–0.2)
BASOPHILS NFR BLD AUTO: 0.4 % — SIGNIFICANT CHANGE UP (ref 0–2)
BILIRUB SERPL-MCNC: 0.4 MG/DL — SIGNIFICANT CHANGE UP (ref 0.2–1.2)
BUN SERPL-MCNC: 11 MG/DL — SIGNIFICANT CHANGE UP (ref 7–23)
CALCIUM SERPL-MCNC: 9.7 MG/DL — SIGNIFICANT CHANGE UP (ref 8.4–10.5)
CHLORIDE SERPL-SCNC: 103 MMOL/L — SIGNIFICANT CHANGE UP (ref 96–108)
CO2 SERPL-SCNC: 25 MMOL/L — SIGNIFICANT CHANGE UP (ref 22–31)
CREAT SERPL-MCNC: 0.81 MG/DL — SIGNIFICANT CHANGE UP (ref 0.5–1.3)
CRP SERPL-MCNC: 0.8 MG/L — SIGNIFICANT CHANGE UP (ref 0–4)
EOSINOPHIL # BLD AUTO: 0.12 K/UL — SIGNIFICANT CHANGE UP (ref 0–0.5)
EOSINOPHIL NFR BLD AUTO: 1.6 % — SIGNIFICANT CHANGE UP (ref 0–6)
ERYTHROCYTE [SEDIMENTATION RATE] IN BLOOD: 5 MM/HR — SIGNIFICANT CHANGE UP
GLUCOSE BLDC GLUCOMTR-MCNC: 171 MG/DL — HIGH (ref 70–99)
GLUCOSE BLDC GLUCOMTR-MCNC: 195 MG/DL — HIGH (ref 70–99)
GLUCOSE SERPL-MCNC: 102 MG/DL — HIGH (ref 70–99)
HCT VFR BLD CALC: 39.1 % — SIGNIFICANT CHANGE UP (ref 34.5–45)
HGB BLD-MCNC: 13 G/DL — SIGNIFICANT CHANGE UP (ref 11.5–15.5)
IMM GRANULOCYTES NFR BLD AUTO: 0.4 % — SIGNIFICANT CHANGE UP (ref 0–1.5)
LYMPHOCYTES # BLD AUTO: 1.78 K/UL — SIGNIFICANT CHANGE UP (ref 1–3.3)
LYMPHOCYTES # BLD AUTO: 23.5 % — SIGNIFICANT CHANGE UP (ref 13–44)
MCHC RBC-ENTMCNC: 31.1 PG — SIGNIFICANT CHANGE UP (ref 27–34)
MCHC RBC-ENTMCNC: 33.2 GM/DL — SIGNIFICANT CHANGE UP (ref 32–36)
MCV RBC AUTO: 93.5 FL — SIGNIFICANT CHANGE UP (ref 80–100)
MONOCYTES # BLD AUTO: 0.36 K/UL — SIGNIFICANT CHANGE UP (ref 0–0.9)
MONOCYTES NFR BLD AUTO: 4.8 % — SIGNIFICANT CHANGE UP (ref 2–14)
NEUTROPHILS # BLD AUTO: 5.25 K/UL — SIGNIFICANT CHANGE UP (ref 1.8–7.4)
NEUTROPHILS NFR BLD AUTO: 69.3 % — SIGNIFICANT CHANGE UP (ref 43–77)
NRBC # BLD: 0 /100 WBCS — SIGNIFICANT CHANGE UP (ref 0–0)
PLATELET # BLD AUTO: 222 K/UL — SIGNIFICANT CHANGE UP (ref 150–400)
POTASSIUM SERPL-MCNC: 4.6 MMOL/L — SIGNIFICANT CHANGE UP (ref 3.5–5.3)
POTASSIUM SERPL-SCNC: 4.6 MMOL/L — SIGNIFICANT CHANGE UP (ref 3.5–5.3)
PROT SERPL-MCNC: 7.6 G/DL — SIGNIFICANT CHANGE UP (ref 6–8.3)
RBC # BLD: 4.18 M/UL — SIGNIFICANT CHANGE UP (ref 3.8–5.2)
RBC # FLD: 12.9 % — SIGNIFICANT CHANGE UP (ref 10.3–14.5)
SARS-COV-2 RNA SPEC QL NAA+PROBE: NEGATIVE — SIGNIFICANT CHANGE UP
SODIUM SERPL-SCNC: 141 MMOL/L — SIGNIFICANT CHANGE UP (ref 135–145)
WBC # BLD: 7.57 K/UL — SIGNIFICANT CHANGE UP (ref 3.8–10.5)
WBC # FLD AUTO: 7.57 K/UL — SIGNIFICANT CHANGE UP (ref 3.8–10.5)

## 2021-07-06 PROCEDURE — 88311 DECALCIFY TISSUE: CPT | Mod: 26

## 2021-07-06 PROCEDURE — 73660 X-RAY EXAM OF TOE(S): CPT | Mod: 26,RT

## 2021-07-06 PROCEDURE — 99233 SBSQ HOSP IP/OBS HIGH 50: CPT

## 2021-07-06 PROCEDURE — 36415 COLL VENOUS BLD VENIPUNCTURE: CPT

## 2021-07-06 PROCEDURE — 99223 1ST HOSP IP/OBS HIGH 75: CPT | Mod: GC

## 2021-07-06 PROCEDURE — 99285 EMERGENCY DEPT VISIT HI MDM: CPT

## 2021-07-06 PROCEDURE — 99215 OFFICE O/P EST HI 40 MIN: CPT

## 2021-07-06 PROCEDURE — 88305 TISSUE EXAM BY PATHOLOGIST: CPT | Mod: 26

## 2021-07-06 RX ORDER — DEXTROSE 50 % IN WATER 50 %
25 SYRINGE (ML) INTRAVENOUS ONCE
Refills: 0 | Status: DISCONTINUED | OUTPATIENT
Start: 2021-07-06 | End: 2021-07-11

## 2021-07-06 RX ORDER — NAFCILLIN 10 G/100ML
2 INJECTION, POWDER, FOR SOLUTION INTRAVENOUS EVERY 4 HOURS
Refills: 0 | Status: DISCONTINUED | OUTPATIENT
Start: 2021-07-06 | End: 2021-07-11

## 2021-07-06 RX ORDER — GLUCAGON INJECTION, SOLUTION 0.5 MG/.1ML
1 INJECTION, SOLUTION SUBCUTANEOUS ONCE
Refills: 0 | Status: DISCONTINUED | OUTPATIENT
Start: 2021-07-06 | End: 2021-07-11

## 2021-07-06 RX ORDER — SODIUM CHLORIDE 9 MG/ML
1000 INJECTION, SOLUTION INTRAVENOUS
Refills: 0 | Status: DISCONTINUED | OUTPATIENT
Start: 2021-07-06 | End: 2021-07-11

## 2021-07-06 RX ORDER — DEXTROSE 50 % IN WATER 50 %
15 SYRINGE (ML) INTRAVENOUS ONCE
Refills: 0 | Status: DISCONTINUED | OUTPATIENT
Start: 2021-07-06 | End: 2021-07-11

## 2021-07-06 RX ORDER — ATORVASTATIN CALCIUM 80 MG/1
40 TABLET, FILM COATED ORAL AT BEDTIME
Refills: 0 | Status: DISCONTINUED | OUTPATIENT
Start: 2021-07-06 | End: 2021-07-11

## 2021-07-06 RX ORDER — MORPHINE SULFATE 50 MG/1
1 CAPSULE, EXTENDED RELEASE ORAL EVERY 6 HOURS
Refills: 0 | Status: DISCONTINUED | OUTPATIENT
Start: 2021-07-06 | End: 2021-07-07

## 2021-07-06 RX ORDER — ACETAMINOPHEN 500 MG
650 TABLET ORAL EVERY 6 HOURS
Refills: 0 | Status: DISCONTINUED | OUTPATIENT
Start: 2021-07-06 | End: 2021-07-08

## 2021-07-06 RX ORDER — ENOXAPARIN SODIUM 100 MG/ML
40 INJECTION SUBCUTANEOUS EVERY 24 HOURS
Refills: 0 | Status: DISCONTINUED | OUTPATIENT
Start: 2021-07-06 | End: 2021-07-11

## 2021-07-06 RX ORDER — INSULIN LISPRO 100/ML
VIAL (ML) SUBCUTANEOUS
Refills: 0 | Status: DISCONTINUED | OUTPATIENT
Start: 2021-07-06 | End: 2021-07-11

## 2021-07-06 RX ORDER — IBUPROFEN 200 MG
600 TABLET ORAL ONCE
Refills: 0 | Status: COMPLETED | OUTPATIENT
Start: 2021-07-06 | End: 2021-07-06

## 2021-07-06 RX ORDER — INSULIN GLARGINE 100 [IU]/ML
23 INJECTION, SOLUTION SUBCUTANEOUS AT BEDTIME
Refills: 0 | Status: DISCONTINUED | OUTPATIENT
Start: 2021-07-06 | End: 2021-07-08

## 2021-07-06 RX ORDER — ETHYL CHLORIDE
1 AEROSOL, SPRAY (ML) TOPICAL ONCE
Refills: 0 | Status: COMPLETED | OUTPATIENT
Start: 2021-07-06 | End: 2021-07-06

## 2021-07-06 RX ORDER — NAFCILLIN 10 G/100ML
2 INJECTION, POWDER, FOR SOLUTION INTRAVENOUS ONCE
Refills: 0 | Status: COMPLETED | OUTPATIENT
Start: 2021-07-06 | End: 2021-07-06

## 2021-07-06 RX ORDER — DEXTROSE 50 % IN WATER 50 %
12.5 SYRINGE (ML) INTRAVENOUS ONCE
Refills: 0 | Status: DISCONTINUED | OUTPATIENT
Start: 2021-07-06 | End: 2021-07-11

## 2021-07-06 RX ORDER — RANITIDINE HYDROCHLORIDE 150 MG/1
1 TABLET, FILM COATED ORAL
Qty: 0 | Refills: 0 | DISCHARGE

## 2021-07-06 RX ORDER — NAFCILLIN 10 G/100ML
2 INJECTION, POWDER, FOR SOLUTION INTRAVENOUS EVERY 4 HOURS
Refills: 0 | Status: DISCONTINUED | OUTPATIENT
Start: 2021-07-06 | End: 2021-07-06

## 2021-07-06 RX ORDER — LIDOCAINE HCL 20 MG/ML
20 VIAL (ML) INJECTION ONCE
Refills: 0 | Status: COMPLETED | OUTPATIENT
Start: 2021-07-06 | End: 2021-07-06

## 2021-07-06 RX ADMIN — ATORVASTATIN CALCIUM 40 MILLIGRAM(S): 80 TABLET, FILM COATED ORAL at 21:55

## 2021-07-06 RX ADMIN — Medication 600 MILLIGRAM(S): at 13:14

## 2021-07-06 RX ADMIN — NAFCILLIN 200 GRAM(S): 10 INJECTION, POWDER, FOR SOLUTION INTRAVENOUS at 14:34

## 2021-07-06 RX ADMIN — NAFCILLIN 200 GRAM(S): 10 INJECTION, POWDER, FOR SOLUTION INTRAVENOUS at 22:27

## 2021-07-06 RX ADMIN — Medication 2: at 22:09

## 2021-07-06 RX ADMIN — Medication 1 APPLICATION(S): at 13:14

## 2021-07-06 RX ADMIN — Medication 600 MILLIGRAM(S): at 12:22

## 2021-07-06 RX ADMIN — NAFCILLIN 200 GRAM(S): 10 INJECTION, POWDER, FOR SOLUTION INTRAVENOUS at 19:26

## 2021-07-06 RX ADMIN — ENOXAPARIN SODIUM 40 MILLIGRAM(S): 100 INJECTION SUBCUTANEOUS at 16:02

## 2021-07-06 RX ADMIN — INSULIN GLARGINE 23 UNIT(S): 100 INJECTION, SOLUTION SUBCUTANEOUS at 22:09

## 2021-07-06 RX ADMIN — Medication 20 MILLILITER(S): at 13:14

## 2021-07-06 NOTE — H&P ADULT - PROBLEM SELECTOR PLAN 1
XR showed destruction of R great toe consistent with chronic OM, no gas. Treated at [facility] for similar infection of R foot 6 weeks of Nafcillin. April MRI Osteomyelitis of the first distal phalanx with osseous destruction and intraosseous abscess and mild synovitis of first interphalangeal joint without osseous changes of the proximal phalanx. XR was negative for OM.  - Nafcillin 2g q4hr   -F/u wound culture and bone biopsy  - Follow ID and podiatry recs XR showed destruction of R great toe consistent with chronic OM, no gas. Treated at [facility] for similar infection of R foot 6 weeks of Nafcillin. April MRI Osteomyelitis of the first distal phalanx with osseous destruction and intraosseous abscess and mild synovitis of first interphalangeal joint without osseous changes of the proximal phalanx. XR was negative for OM. ESR elevated.   - Nafcillin 2g q4hr   -F/u wound culture and bone biopsy  - Follow ID and podiatry recs XR showed destruction of R great toe consistent with chronic OM, no gas. Treated at [facility] for infection of R foot (MSSA) 6 weeks of Nafcillin. April MRI Osteomyelitis of the first distal phalanx with osseous destruction and intraosseous abscess and mild synovitis of first interphalangeal joint without osseous changes of the proximal phalanx. XR was negative for OM. ESR elevated. No organisms seen on gram stain.  Possible surrounding soft tissue infection vs active osteo.  -Nafcillin 2g q4hr   -F/u wound culture and bone biopsy  - Follow ID and podiatry recs XR showed destruction of R great toe consistent with chronic OM, no gas. Treated at [facility] for infection of R foot (MSSA) 6 weeks of Nafcillin. April MRI Osteomyelitis of the first distal phalanx with osseous destruction and intraosseous abscess and mild synovitis of first interphalangeal joint without osseous changes of the proximal phalanx. XR was negative for OM. ESR wnl. No organisms seen on gram stain.  Possible surrounding soft tissue infection vs active osteo.  -Nafcillin 2g q4hr   -F/u wound culture and bone biopsy  - Follow ID and podiatry recs Does not meet SIRS. XR showed destruction of R great toe consistent with chronic OM, no gas. Treated at [facility] for infection of R foot (MSSA) 6 weeks of Nafcillin. April MRI Osteomyelitis of the first distal phalanx with osseous destruction and intraosseous abscess and mild synovitis of first interphalangeal joint without osseous changes of the proximal phalanx. XR was negative for OM. ESR wnl. No organisms seen on gram stain.  Possible surrounding soft tissue infection vs active osteo.  -Nafcillin 2g q4hr   -F/u wound culture and bone biopsy  - Follow ID and podiatry recs

## 2021-07-06 NOTE — ED ADULT NURSE NOTE - OBJECTIVE STATEMENT
Pt presents to ED C/O R foot pain and swelling over the past 2 weeks. Pt referred to [facility] by PCP for biopsy, Hx of osteomyelitis is R foot. Denies N/V/D, fever. Pt seen by ortho. Made comfortable with blankets, family at bedside. RN continuing to monitor.

## 2021-07-06 NOTE — H&P ADULT - PROBLEM SELECTOR PLAN 5
Current smoker counseling offered. No hx hospitalizations, no SOB or wheezing. Home meds symbicoty and ellipta   -C/w home inhalers -Home med atorvastatin 40mg QD  -Continue with home med

## 2021-07-06 NOTE — PHYSICAL THERAPY INITIAL EVALUATION ADULT - IMPAIRMENTS FOUND, PT EVAL
aerobic capacity/endurance/ergonomics and body mechanics/gait, locomotion, and balance/poor safety awareness aerobic capacity/endurance/ergonomics and body mechanics/gait, locomotion, and balance/muscle strength/poor safety awareness

## 2021-07-06 NOTE — H&P ADULT - NSHPPHYSICALEXAM_GEN_ALL_CORE
INCOMPLETE    PHYSICAL EXAM:    Constitutional: female/male, WDWN, NAD  HEENT: PERRL, EOMI, sclera non-icteric, neck supple, trachea midline, no masses, no JVD, MMM, good dentition  Respiratory: CTA b/l, good air entry b/l, no wheezing, no rhonchi, no rales, without accessory muscle use and no intercostal retractions  Cardiovascular: RRR, normal S1S2, no M/R/G  Gastrointestinal: soft, NTND, no masses palpable, BS normal  Extremities: Warm, well perfused, pulses equal bilateral upper and lower extremities, no edema, no clubbing  Neurological: AAOx3, CN Grossly intact  Skin: Normal temperature, warm, dry INCOMPLETE    PHYSICAL EXAM:    Constitutional: female, WDWN, NAD  HEENT: PERRL, EOMI, sclera non-icteric, neck supple, trachea midline, no masses, no JVD, MMM, good dentition  Respiratory: CTA b/l, good air entry b/l, no wheezing, no rhonchi, no rales, without accessory muscle use and no intercostal retractions  Cardiovascular: RRR, normal S1S2, no M/R/G  Gastrointestinal: soft, NTND, no masses palpable, BS normal  Extremities: Warm, well perfused, pulses equal bilateral upper and lower extremities, no edema, no clubbing. R toe mildly erythematous, mild swelling but similar in size to L toe. Absent toenail, incision and suture. No purulent fluid.   Neurological: AAOx4, CN Grossly intact  Skin: Normal temperature, warm, dry

## 2021-07-06 NOTE — H&P ADULT - HISTORY OF PRESENT ILLNESS
INCOMPLETE      CC: "  HPI  Denies fevers, chills, cough, chest pain, dyspnea, nausea, headache, diarrhea, sick contactschange in urinary or bowel habits      In the ED:    - VS: Tmax: , HR:  , BP:  , RR: , O2: %     - Pertinent Labs:     - Imaging: CXR: CT: US: Cath: EKG:     - Treatment/interventions:        INCOMPLETE      CC: "  HPI  Denies fevers, chills, cough, chest pain, dyspnea, nausea, headache, diarrhea, sick contactschange in urinary or bowel habits      In the ED:    - VS: Tmax: 98.2, HR: 70, BP: 112/69, RR: 16, O2: 98% RA     - Pertinent Labs: WBC wnl, CRP wnl    - Imaging: CXR: XR toe showed destruction of R great toe consistent with chronic OM. CT: US: Cath: EKG:     - Treatment/interventions: ibuprofen 600mg x1, nafcillin 2g x1, lovenox 40mg x1, lidocaine local and ethyl chloride spray to toe        INCOMPLETE    70F PMHx HTN, DM (A1c 6.7), HLD with prior R great toe osteomyelitis in April treated with 6 week IV Nafcillin here for recurrence of toe pain and swelling. Describes pain as a "pulling" sensation 6-7/10 pain.  Around 26th evening patient noticed R toe was swollen and painful. Next morning, podiatrist performed I&D, drained pus, no culture taken, given Bactrim. Followed by Dr. Encinas (ID) who recommended pt come to ED, as podiatrist office closed.   Pt denied fever, chills, n/v, CP, SOB.        In the ED:    - VS: Tmax: 98.2, HR: 70, BP: 112/69, RR: 16, O2: 98% RA     - Pertinent Labs: WBC wnl, CRP wnl    - Imaging: CXR: XR toe showed destruction of R great toe consistent with chronic OM. CT: US: Cath: EKG:     - Treatment/interventions: ibuprofen 600mg x1, nafcillin 2g x1, loxenox 40mg x1, lidocaine local and ethyl chloride spray for I&D         70F PMHx HTN, DM (A1c 6.7), HLD with prior R great toe osteomyelitis in April treated with 6 week IV Nafcilin here for recurrence of toe pain and swelling. Describes pain as a "pulling" sensation 6-7/10 pain.  Around 26th evening patient noticed R toe was swollen and painful. Next morning, podiatrist performed I&D, drained pus, no culture taken, given Bactrim. Followed by Dr. Encinas (ID) seen today who recommended pt come to ED, as podiatrist office closed.   Pt denied fever, chills, n/v, CP, SOB.        In the ED:    - VS: Tmax: 98.2, HR: 70, BP: 112/69, RR: 16, O2: 98% RA     - Pertinent Labs: WBC wnl, CRP wnl    - Imaging: CXR: XR toe showed destruction of R great toe consistent with chronic OM. CT: US: Cath: EKG:     - Treatment/interventions: ibuprofen 600mg x1, nafcillin 2g x1, loxenox 40mg x1, lidocaine local and ethyl chloride spray for I&D         70F PMHx HTN, DM (A1c 6.7), HLD, asthma with prior R great toe osteomyelitis in April treated with 6 week IV Nafcilin here for recurrence of toe pain and swelling. Describes pain as a "pulling" sensation 6-7/10 pain.  Around 26th evening patient noticed R toe was swollen and painful. Next morning, podiatrist performed I&D, drained pus, no culture taken, given Bactrim. Followed by Dr. Encinas (ID) seen today who recommended pt come to ED, as podiatrist office closed.   Pt denied fever, chills, n/v, CP, SOB.        In the ED:    - VS: Tmax: 98.2, HR: 70, BP: 112/69, RR: 16, O2: 98% RA     - Pertinent Labs: WBC wnl, CRP wnl    - Imaging: CXR: XR toe showed destruction of R great toe consistent with chronic OM. CT: US: Cath: EKG:     - Treatment/interventions: ibuprofen 600mg x1, nafcillin 2g x1, loxenox 40mg x1, lidocaine local and ethyl chloride spray for I&D         70F PMHx HTN, DM (A1c 6.7), HLD, asthma with prior R great toe osteomyelitis in April treated with 6 week IV Nafcilin here for recurrence of toe pain and swelling. Describes pain as a "pulling" sensation 6-7/10 pain.  Around 26th evening patient noticed R toe was swollen and painful. Next morning, podiatrist performed I&D, drained pus, no culture taken, given Bactrim (1 week). Followed by Dr. Dominguez (ID) seen today who recommended pt come to ED, as podiatrist office closed.   Pt denied fever, chills, n/v, CP, SOB.        In the ED:    - VS: Tmax: 98.2, HR: 70, BP: 112/69, RR: 16, O2: 98% RA     - Pertinent Labs: WBC wnl, CRP wnl    - Imaging: CXR: XR toe showed destruction of R great toe consistent with chronic OM. CT: US: Cath: EKG:     - Treatment/interventions: ibuprofen 600mg x1, nafcillin 2g x1, loxenox 40mg x1, lidocaine local and ethyl chloride spray, I&D, Bone biopsy

## 2021-07-06 NOTE — ED PROVIDER NOTE - PHYSICAL EXAMINATION
CONSTITUTIONAL: Well-appearing; well-nourished; in no apparent distress.   HEAD: Normocephalic; atraumatic.   EYES: PERRL; EOM intact; conjunctiva and sclera clear  ENT: normal nose; no rhinorrhea; normal pharynx with no erythema or lesions.   NECK: Supple; non-tender;   CARDIOVASCULAR: rrr, no audible murmurs   RESPIRATORY: Breathing easily;   MSK: Positive swelling, tenderness to distal great toe w/o fluctuance or active draining.  EXT: No cyanosis or edema; N/V intact  SKIN: Normal for age and race; warm; dry; good turgor; no apparent lesions or rash.

## 2021-07-06 NOTE — ED ADULT TRIAGE NOTE - CHIEF COMPLAINT QUOTE
Pt was sent to ED by podiatrist for culture of wound on right foot. Pt w/ hx of bone infection to right foot in April 2021. Pt denies fever, chills, drainage.

## 2021-07-06 NOTE — PHYSICAL THERAPY INITIAL EVALUATION ADULT - GENERAL OBSERVATIONS, REHAB EVAL
Pt found semi-supine, no acute distress, hep-locked, WBAT to R Heel, present with family, consents to PT IE.

## 2021-07-06 NOTE — H&P ADULT - PROBLEM SELECTOR PLAN 4
-Home med atorvastatin 40mg QD  -Continue with home med Home med valsartan 80mg  -Hold home valsartan QD. Start if hypertensive

## 2021-07-06 NOTE — ED PROVIDER NOTE - OBJECTIVE STATEMENT
72 y/o F w/ PMHx HTN, DM, hyperlipidemia presents today w/ right great toe osteomyelitis s/p 6 wks of IV Nafcillin sent in by Dr. Dominguez for podiatry evaluation and culture of toe. Patient states that she saw her podiatrist last Thursday, toe was healing appropriately, that night noticed pain and swelling. Patient states that she returned to the podiatrist in the morning, DrPrabhjot opened toe and drained puss, gave Bactrim. Patient saw her DrPrabhjot today, was advised to see podiatrist however office is closed, told to come to ED for evaluation. Denies fevers, chills, numbness, and tingling. 70 y/o F w/ PMHx HTN, DM, hyperlipidemia, right great toe osteomyelitis s/p 6 wks of IV Nafcillin in April sent in by Dr. Dominguez for podiatry evaluation and culture of toe. Patient states that she saw her podiatrist last Thursday, toe was healing appropriately, that night noticed pain and swelling. Patient states that she returned to the podiatrist in the morning, opened toe and drained pus, gave Bactrim. Patient saw her ID doctor today and was advised to see podiatrist however office is closed, told to come to ED for evaluation. Denies fevers, chills, numbness, and tingling.

## 2021-07-06 NOTE — PHYSICAL THERAPY INITIAL EVALUATION ADULT - GAIT DEVIATIONS NOTED, PT EVAL
decreased francisco javier/increased time in double stance/decreased step length/decreased stride length/decreased weight-shifting ability

## 2021-07-06 NOTE — HISTORY OF PRESENT ILLNESS
[FreeTextEntry1] : 70 year old female with HTN, DM2, HLD, COPD, back surgery X 5 admitted 4/17-4/21 with OM R hallux. She had ingrown nail s/p removal. She then developed worsening pain and swelling. Found to have OM R hallux. Bone biopsy was done 4/19. Surgical swab grew Staph aureus and Staph lugdunensis. She was treated with nafcillin 2 g IV q 4 h x 6 weeks (4/21 - 6/2).  She had podiatry appointment on 6/24, exam WNL. That night her R foot swelled. She denies trauma or manipulation by podiatry. She returned to podiatry on 6/25 Xray of R foot and I&D done. She was started on Bactrim x 2 weeks.  Symptoms initially improved on Bactrim however now pain is worsening.  She denies fever/chills.

## 2021-07-06 NOTE — ED ADULT TRIAGE NOTE - NS ED NURSE BANDS TYPE
0740-During bedside report, patient states that she needs to go to work this morning, as she has a meeting. RN notified Missy Biggs MD that patient wants to leave, and attempted to leave AMA last night. MD Caroline will come to assess patient this AM. Psych consult pending. Charge RN and nursing supervisor aware. 0945-RN called [facility]. Necessary information provided to poison control. Per infection control representative, Vaughn Somers, patient is cleared from their standpoint. 1100-Per psych NP, patient to be evaluated by [address] [place] [address] for TDO. 1340-[facility] provided patient with outpatient resources. TDO not recommended at this time. Name band;

## 2021-07-06 NOTE — PHYSICAL EXAM
[General Appearance - Alert] : alert [General Appearance - Well-Appearing] : healthy appearing [Sclera] : the sclera and conjunctiva were normal [Outer Ear] : the ears and nose were normal in appearance [Examination Of The Oral Cavity] : the lips and gums were normal [Oropharynx] : the oropharynx was normal with no thrush [Auscultation Breath Sounds / Voice Sounds] : lungs were clear to auscultation bilaterally [Heart Rate And Rhythm] : heart rate was normal and rhythm regular [Heart Sounds] : normal S1 and S2 [Murmurs] : no murmurs [Edema] : there was no peripheral edema [Bowel Sounds] : normal bowel sounds [Abdomen Soft] : soft [Abdomen Tenderness] : non-tender [Costovertebral Angle Tenderness] : no CVA tenderness [] : no rash [FreeTextEntry1] : R hallux distal phalynx edematous/warm and tender, increased warmth R foot

## 2021-07-06 NOTE — H&P ADULT - PROBLEM SELECTOR PLAN 2
Pain likely related to osteomyelitis of toe.  -Acetaminophen 650mg q6h Pain likely related to osteomyelitis of toe.  -Acetaminophen 650mg q6h PRN  -If pain not controlled by tylenol, low dose morphine for severe pain

## 2021-07-06 NOTE — PHYSICAL THERAPY INITIAL EVALUATION ADULT - PERTINENT HX OF CURRENT PROBLEM, REHAB EVAL
71F PMH DM controlled by basal insulin with prior osteomyelitis of R toe (IV Nafcillin 6 weeks) presented for pain and swelling of R toe. X-ray reveals chronic osteomyelitis in R foot. 71F PMH DM controlled by basal insulin with prior osteomyelitis of R toe (IV Nafcillin 6 weeks) presented for pain and swelling of R toe. Frontal, lateral and oblique views of the right great toe demonstrate interval increasing sclerosis and persistent lucency and fragmentation of the distal left of the great toe with overlying skin defect, consistent with chronic osteomyelitis.

## 2021-07-06 NOTE — CONSULT NOTE ADULT - ASSESSMENT
IMPRESSION:  Recurrent infection of the right first toe, likely osteomyelitis.  Suspect this is the same bacteria (MSSA, staph lugdunensis)    Recommend:  1.  Can restart Nafcillin 2 grams IV q4hrs  2.  Follow up biopsy result/culture  3.  Check ESR/CRP    ID team 2 will follow

## 2021-07-06 NOTE — ED PROVIDER NOTE - ATTENDING CONTRIBUTION TO CARE
73 yo female, hx of htn, hdl, DM, R great toe osteo s/p treatment w nafcillin referred to ED for great toe w swelling, assoc pain and swelling in toe again. drained the toe, started on Bactrim, didn't take on cultures. Pending labs, XR,  podiatry eval.

## 2021-07-06 NOTE — H&P ADULT - NSHPLABSRESULTS_GEN_ALL_CORE
LABS:                         13.0   7.57  >-----< 222           ( 07-06-21 @ 13:11 )             39.1       141  |  103  |   11  ----------------------< 102    (07-06-21 @ 13:11)     4.6  |  25  |  0.81    Anion Gap: 13    Ca   9.7   (07-06-21 @ 13:11)  Mg   x    (07-06-21 @ 13:11)  Phos x    (07-06-21 @ 13:11)       TP 9.7     |  AST 4.6    -------------------------     Alb x     |  ALT x               (07-06-21 @ 13:11)  -------------------------     T-bili 4.6  |  AlkPh 87    D-bili x

## 2021-07-06 NOTE — H&P ADULT - PROBLEM SELECTOR PLAN 6
On Basaglar 30 at home. A1C 3 months ago 6.7.  - Inpatient ISS + lantus 23U Current smoker counseling offered. No hx hospitalizations, no SOB or wheezing. Home meds symbicoty and ellipta   -C/w home inhalers

## 2021-07-06 NOTE — H&P ADULT - PROBLEM SELECTOR PLAN 3
Home med valsartan 80mg  - c/w home valsartan QD On Basaglar 30 at home. A1C 3 months ago 6.7.  - Inpatient ISS + lantus 23U

## 2021-07-06 NOTE — H&P ADULT - NSHPSOCIALHISTORY_GEN_ALL_CORE
Lives in apartment building with elevator, able to ambulate.   Smokes 6-7cigarettes/days for 56 years  No etOH use  No drug use

## 2021-07-06 NOTE — ED PROVIDER NOTE - CLINICAL SUMMARY MEDICAL DECISION MAKING FREE TEXT BOX
70 y/o F w/ PMHx HTN, DM, hyperlipidemia presents today w/ right great toe osteomyelitis s/p 6 wks of IV Nafcillin w/ worsening swelling and pain sent in by Dr. Dominguez for podiatry evaluation and culture of toe. Patient is on Day 4 of Bactrim, afebrile w/ swelling and tenderness. No erythema, no drainage. Will check labs, x-ray, and podiatry consult.

## 2021-07-06 NOTE — CONSULT NOTE ADULT - SUBJECTIVE AND OBJECTIVE BOX
Attending: Kane Stanley DPM    Patient is a 71y old  Female who presents with a chief complaint of R great toe infection    HPI: 70 y/o F w/ PMHx HTN, DM Type 2, HLD presents today w/ right great toe osteomyelitis s/p 6 wks of IV Nafcillin (4/21-6/2) sent in by Dr. Dominguez for podiatry evaluation and culture of toe. Patient states that she saw her podiatrist Thursday 6/24 and was told that the right great toe was healing appropriately, that night pt noticed increased pain and swelling. Pt returned to outpatient podiatry clinic on Friday 6/25 during which a small incision was made to tip of right great toe and pus was expressed. Pt and patient's daughter do not recall if a culture of the pus was collected; pt was prescribed Bactrim. Pt noticed that pain and swelling did not improve and went to see ID team 7/6.     Review of systems negative except per HPI    PAST MEDICAL & SURGICAL HISTORY:  Asthma    Diabetes    High cholesterol    Hypertension    H/O laminectomy  1990, 1991      Home Medications:  atorvastatin: 40 milligram(s) orally once a day (at bedtime) (17 Apr 2021 02:09)  Basaglar KwikPen 100 units/mL subcutaneous solution: 30 unit(s) subcutaneous once a day (at bedtime) (17 Apr 2021 02:08)  Incruse Ellipta 62.5 mcg/inh inhalation powder: 1 puff(s) inhaled once a day (17 Feb 2021 14:26)  Multiple Vitamins oral tablet: 1 tab(s) orally once a day (17 Apr 2021 02:12)  raNITIdine 300 mg oral tablet: 1 tab(s) orally once a day (at bedtime) (05 Jan 2020 12:59)  Symbicort 160 mcg-4.5 mcg/inh inhalation aerosol: 2 puff(s) inhaled 2 times a day (05 Jan 2020 12:59)  valsartan 80 mg oral capsule: 1 cap(s) orally once a day (17 Apr 2021 02:11)    Allergies    No Known Allergies    Intolerances      FAMILY HISTORY:  FH: diabetes mellitus (Father, Mother)  mother and father      Social History:       LABS                        13.0   7.57  )-----------( 222      ( 06 Jul 2021 13:11 )             39.1               Vital Signs Last 24 Hrs  T(C): 36.8 (06 Jul 2021 11:03), Max: 36.8 (06 Jul 2021 11:03)  T(F): 98.2 (06 Jul 2021 11:03), Max: 98.2 (06 Jul 2021 11:03)  HR: 70 (06 Jul 2021 11:03) (70 - 70)  BP: 112/69 (06 Jul 2021 11:03) (112/69 - 112/69)  BP(mean): --  RR: 16 (06 Jul 2021 11:03) (16 - 16)  SpO2: 98% (06 Jul 2021 11:03) (98% - 98%)    PHYSICAL EXAM  General: NAD, AA0x3    Lower Extremity Focused:  Vasc:  Derm:  Neuro:  MSK:     RADIOLOGY                       Attending: Kane Stanley DPM    Patient is a 71y old  Female who presents with a chief complaint of R great toe infection    HPI: 70 y/o F w/ PMHx HTN, DM Type 2, HLD presents today w/ right great toe pain + erythema + edema - s/p 6 wks of IV Nafcillin (4/21-6/2/21) for osteomyelitis of R hallux distal phalanx sent in today 7/6 by Dr. Dominguez for podiatry evaluation. Patient states that she saw her podiatrist Thursday 6/24 and was told that the right great toe was healing appropriately, that night pt noticed increased pain and swelling. Pt returned to outpatient podiatry clinic on Friday 6/25 during which a small incision was made to tip of right great toe and pus was expressed. Pt and patient's daughter do not recall if a culture of the pus was collected; pt was prescribed Bactrim. Pt noticed that pain and swelling did not improve and went to see ID (Dr. Dominguez) 7/6 who recommended visit to [facility] ED. Pt denies F/N/V/C/CP/SOB.     Review of systems negative except per HPI    PAST MEDICAL & SURGICAL HISTORY:  Asthma    Diabetes    High cholesterol    Hypertension    H/O laminectomy  1990, 1991      Home Medications:  atorvastatin: 40 milligram(s) orally once a day (at bedtime) (17 Apr 2021 02:09)  Basaglar KwikPen 100 units/mL subcutaneous solution: 30 unit(s) subcutaneous once a day (at bedtime) (17 Apr 2021 02:08)  Incruse Ellipta 62.5 mcg/inh inhalation powder: 1 puff(s) inhaled once a day (17 Feb 2021 14:26)  Multiple Vitamins oral tablet: 1 tab(s) orally once a day (17 Apr 2021 02:12)  raNITIdine 300 mg oral tablet: 1 tab(s) orally once a day (at bedtime) (05 Jan 2020 12:59)  Symbicort 160 mcg-4.5 mcg/inh inhalation aerosol: 2 puff(s) inhaled 2 times a day (05 Jan 2020 12:59)  valsartan 80 mg oral capsule: 1 cap(s) orally once a day (17 Apr 2021 02:11)    Allergies    No Known Allergies    Intolerances    FAMILY HISTORY:  FH: diabetes mellitus (Father, Mother)  mother and father      LABS                        13.0   7.57  )-----------( 222      ( 06 Jul 2021 13:11 )             39.1               Vital Signs Last 24 Hrs  T(C): 36.8 (06 Jul 2021 11:03), Max: 36.8 (06 Jul 2021 11:03)  T(F): 98.2 (06 Jul 2021 11:03), Max: 98.2 (06 Jul 2021 11:03)  HR: 70 (06 Jul 2021 11:03) (70 - 70)  BP: 112/69 (06 Jul 2021 11:03) (112/69 - 112/69)  BP(mean): --  RR: 16 (06 Jul 2021 11:03) (16 - 16)  SpO2: 98% (06 Jul 2021 11:03) (98% - 98%)    PHYSICAL EXAM  General: NAD, AA0x3    Lower Extremity Focused:  Vasc: DP/PT 2/4 B/L. CFT brisk x 10. TG cool to cool. Mild edema to R hallux.   Derm: Mild erythema to right hallux. Healed stab incision to medial distal tip of right hallux.   Neuro: Protective sensation grossly intact b/l  MSK: 5/5 muscle strength in all crural compartments b/l. Ambulates independently w/o assistive device.     RADIOLOGY  R foot XR Resident Wet Read: Consolidation of distal phalanx bone. Area of lucency medially and laterally near tubercles of distal phalanx.                      Attending: Kane Stanley DPM    Patient is a 71y old  Female who presents with a chief complaint of R great toe infection    HPI: 70 y/o F w/ PMHx HTN, DM Type 2, HLD presents today w/ right great toe pain + erythema + edema x 11 days- s/p 6 wks of IV Nafcillin (4/21-6/2/21) for osteomyelitis of R hallux distal phalanx sent in today 7/6 by Dr. Dominguez for podiatry evaluation. Patient states that she saw her podiatrist Thursday 6/24 and was told that the right great toe was healing appropriately, that night pt noticed increased pain and swelling. Pt returned to outpatient podiatry clinic on Friday 6/25 during which a small incision was made to tip of right great toe and pus was expressed. Pt and patient's daughter do not recall if a culture of the pus was collected; pt was prescribed Bactrim. Pt noticed that pain and swelling did not improve and went to see ID (Dr. Dominguez) 7/6 who recommended visit to [facility] ED. Pt denies F/N/V/C/CP/SOB.     Review of systems negative except per HPI    PAST MEDICAL & SURGICAL HISTORY:  Asthma    Diabetes    High cholesterol    Hypertension    H/O laminectomy  1990, 1991      Home Medications:  atorvastatin: 40 milligram(s) orally once a day (at bedtime) (17 Apr 2021 02:09)  Basaglar KwikPen 100 units/mL subcutaneous solution: 30 unit(s) subcutaneous once a day (at bedtime) (17 Apr 2021 02:08)  Incruse Ellipta 62.5 mcg/inh inhalation powder: 1 puff(s) inhaled once a day (17 Feb 2021 14:26)  Multiple Vitamins oral tablet: 1 tab(s) orally once a day (17 Apr 2021 02:12)  raNITIdine 300 mg oral tablet: 1 tab(s) orally once a day (at bedtime) (05 Jan 2020 12:59)  Symbicort 160 mcg-4.5 mcg/inh inhalation aerosol: 2 puff(s) inhaled 2 times a day (05 Jan 2020 12:59)  valsartan 80 mg oral capsule: 1 cap(s) orally once a day (17 Apr 2021 02:11)    Allergies    No Known Allergies    Intolerances    FAMILY HISTORY:  FH: diabetes mellitus (Father, Mother)  mother and father      LABS                        13.0   7.57  )-----------( 222      ( 06 Jul 2021 13:11 )             39.1               Vital Signs Last 24 Hrs  T(C): 36.8 (06 Jul 2021 11:03), Max: 36.8 (06 Jul 2021 11:03)  T(F): 98.2 (06 Jul 2021 11:03), Max: 98.2 (06 Jul 2021 11:03)  HR: 70 (06 Jul 2021 11:03) (70 - 70)  BP: 112/69 (06 Jul 2021 11:03) (112/69 - 112/69)  BP(mean): --  RR: 16 (06 Jul 2021 11:03) (16 - 16)  SpO2: 98% (06 Jul 2021 11:03) (98% - 98%)    PHYSICAL EXAM  General: NAD, AA0x3    Lower Extremity Focused:  Vasc: DP/PT 2/4 B/L. CFT brisk x 10. TG cool to cool. Mild edema to R hallux.   Derm: Mild erythema to right hallux. Healed stab incision to medial distal tip of right hallux.   Neuro: Protective sensation grossly intact b/l  MSK: 5/5 muscle strength in all crural compartments b/l. Ambulates independently w/o assistive device.     RADIOLOGY  R foot XR Resident Wet Read: Consolidation of distal phalanx bone. Area of lucency medially and laterally near tubercles of distal phalanx.

## 2021-07-06 NOTE — CONSULT NOTE ADULT - SUBJECTIVE AND OBJECTIVE BOX
HPI:    71 year old female with HTN, DM2, HLD, COPD, back surgery X 5 admitted 4/17-4/21 with OM R hallux. She had ingrown nail s/p removal. She then developed worsening pain and swelling. Found to have OM R hallux. Bone biopsy was done 4/19. Surgical swab grew Staph aureus and Staph lugdunensis. She was treated with nafcillin 2 g IV q 4 h x 6 weeks (4/21 - 6/2). She had podiatry appointment on 6/24, exam WNL. That night her R foot swelled. She denies trauma or manipulation by podiatry. She returned to podiatry on 6/25 Xray of R foot and I&D done. She was started on Bactrim x 2 weeks. Symptoms initially improved on Bactrim however now pain is worsening. She denies fever/chills.     Patient had a biopsy done in the ER              PAST MEDICAL & SURGICAL HISTORY:  Asthma    Diabetes    High cholesterol    Hypertension    H/O laminectomy  1990, 1991          REVIEW OF SYSTEMS:    General:	 no weakness; no fevers, no chills  Skin/Breast: no rash  Respiratory and Thorax: no SOB, no cough  Cardiovascular:	No chest pain  Gastrointestinal:	 no nausea, vomiting , diarrhea  Genitourinary:	no dysuria, no difficulty urinating, no hematuria  Musculoskeletal:	no weakness, no joint swelling/pain  Neurological:	no focal weakness/numbness  Endocrine:	no polyuria, no polydipsia      ANTIBIOTICS:  MEDICATIONS  (STANDING):  enoxaparin Injectable 40 milliGRAM(s) SubCutaneous every 24 hours    MEDICATIONS  (PRN):      Allergies    No Known Allergies    Intolerances        SOCIAL HISTORY:    FAMILY HISTORY:  FH: diabetes mellitus (Father, Mother)  mother and father        Vital Signs Last 24 Hrs  T(C): 36.8 (06 Jul 2021 11:03), Max: 36.8 (06 Jul 2021 11:03)  T(F): 98.2 (06 Jul 2021 11:03), Max: 98.2 (06 Jul 2021 11:03)  HR: 70 (06 Jul 2021 11:03) (70 - 70)  BP: 112/69 (06 Jul 2021 11:03) (112/69 - 112/69)  BP(mean): --  RR: 16 (06 Jul 2021 11:03) (16 - 16)  SpO2: 98% (06 Jul 2021 11:03) (98% - 98%)    PHYSICAL EXAM:  Constitutional:  non-toxic, no distress  Eyes:  no icterus   Ear/Nose/Throat: no oral lesion  Neck:  supple  Respiratory: CTA mihir  Cardiovascular: S1S2 RRR, no murmurs  Gastrointestinal:soft, (+) BS, no HSM  Extremities: right first toe with swelling, + tenderness, no active drainage   Vascular: DP Pulse:	right normal; left normal            LABS:                        13.0   7.57  )-----------( 222      ( 06 Jul 2021 13:11 )             39.1     07-06    141  |  103  |  11  ----------------------------<  102<H>  4.6   |  25  |  0.81    Ca    9.7      06 Jul 2021 13:11    TPro  7.6  /  Alb  4.6  /  TBili  0.4  /  DBili  x   /  AST  27  /  ALT  21  /  AlkPhos  87  07-06          MICROBIOLOGY:    Culture - Tissue with Gram Stain (04.19.21 @ 13:15)    -  Vancomycin: S 1    Gram Stain:   Test cannot be performed on this type of specimen.    -  Cefazolin: S <=4    -  Clindamycin: S <=0.25    -  Erythromycin: S <=0.25    -  Linezolid: S 2    -  Oxacillin: S 0.5    -  RIF- Rifampin: S <=1 Should not be used as monotherapy    -  Trimethoprim/Sulfamethoxazole: S <=0.5/9.5    Specimen Source: .Tissue Other, R foot distal phalanx bone    Culture Results:   Growth in fluid media only Staphylococcus aureus    Organism Identification: Staphylococcus aureus    Organism: Staphylococcus aureus    Method Type: DAVID      RADIOLOGY & ADDITIONAL STUDIES:

## 2021-07-06 NOTE — H&P ADULT - PROBLEM SELECTOR PLAN 7
Fluids: None  Electrolytes: replete as necessary, K>4, Mg>2  Nutrition: consistent carb, DASH TLC  Bowel Regimen: None  DVT ppx: lovenox 40  GI ppx: None  Code: DNR/DNI  Disposition: medical floor Fluids: None  Electrolytes: replete as necessary, K>4, Mg>2  Nutrition: consistent carb  Bowel Regimen: None  DVT ppx: lovenox 40  GI ppx: None  Code: DNR/DNI  Disposition: medical floor

## 2021-07-06 NOTE — CONSULT NOTE ADULT - ASSESSMENT
70 y/o F w/ PMHx HTN, DM Type 2, HLD presents today w/ right great toe pain + erythema + edema; has a hx of OM s/p 6 wks of IV Nafcillin (4/21-6/2) sent in by Dr. Dominguez for podiatry evaluation. Podiatry consulted for mgmt of Right hallux cellulitis + osteomyelitis non-responsive to prior treatment. Pt is s/p bedside bone biopsy of Right hallux distal phalanx.     P:   - Admit to Medicine  - Start IV abx per ID recs - Dr. Dominguez aware of pt admission  - Right hallux anesthesized w/ 1% Lidocaine plain. [employer] bone marrow biopsy kit used to obtain Right distal phalanx sample. Pt tolerated procedure w/o AE.   - Right hallux dressed with gauze, maged   - WBAT to R heel  Podiatry following 70 y/o F w/ PMHx HTN, DM Type 2, HLD presents today w/ right great toe pain + erythema + edema; has a hx of OM s/p 6 wks of IV Nafcillin (4/21-6/2) sent in by Dr. Dominguez for podiatry evaluation. Podiatry consulted for mgmt of Right hallux cellulitis + osteomyelitis non-responsive to prior treatment. Pt is s/p bedside bone biopsy of Right hallux distal phalanx.     P:   - Admit to Medicine  - Start IV abx per ID recs - Dr. Dominguez aware of pt admission  - Right hallux anesthesized w/ 1% Lidocaine plain. [employer] bone marrow biopsy kit used to obtain Right distal phalanx sample. Pt tolerated procedure w/o AE.   - Right hallux dressed with gauze, maged   - F/U R hallux distal phalanx micro & path  - WBAT to R heel  Podiatry following 72 y/o F w/ PMHx HTN, DM Type 2, HLD presents today w/ right great toe pain + erythema + edema; has a hx of OM s/p 6 wks of IV Nafcillin (4/21-6/2) sent in by Dr. Dominguez for podiatry evaluation. Podiatry consulted for mgmt of Right hallux cellulitis + osteomyelitis non-responsive to prior treatment. Pt is s/p bedside bone biopsy of Right hallux distal phalanx.     P:   - Admit to Medicine  - Start IV abx per ID recs - Dr. Dominguez aware of pt admission  - Right hallux anesthesized w/ 1% Lidocaine plain. Stab incision to medial aspect of R hallux made with sterile #11 blade. [employer] bone marrow biopsy kit used to obtain Right distal phalanx sample. Stab incision closed primarily with 4-0 nylon. Pt tolerated procedure w/o AE.   - Right hallux dressed with gauze, maged   - F/U R hallux distal phalanx micro & path  - WBAT to R heel  Podiatry following 72 y/o F w/ PMHx HTN, DM Type 2, HLD presents today w/ right great toe pain + erythema + edema; has a hx of OM s/p 6 wks of IV Nafcillin (4/21-6/2) sent in by Dr. Dominguez for podiatry evaluation. Podiatry consulted for mgmt of Right hallux cellulitis + osteomyelitis non-responsive to prior treatment. Pt is s/p bedside bone biopsy of Right hallux distal phalanx.     P:   - Admit to Medicine  - Start IV abx per ID recs - Dr. Dominguez aware of pt admission  - Right hallux anesthesized w/ 10cc 1% Lidocaine plain. Stab incision to medial aspect of R hallux made with sterile #11 blade. [employer] bone marrow biopsy kit used to obtain Right distal phalanx sample. Stab incision closed primarily with 4-0 nylon. Pt tolerated procedure w/o AE.   - Right hallux dressed with gauze, maged   - F/U R hallux distal phalanx micro & path  - WBAT to R heel  Podiatry following

## 2021-07-06 NOTE — REVIEW OF SYSTEMS
[As Noted in HPI] : as noted in HPI [Fever] : no fever [Chills] : no chills [Eye Pain] : no eye pain [Eyesight Problems] : no eyesight problems [Nasal Discharge] : no nasal discharge [Sore Throat] : no sore throat [Chest Pain] : no chest pain [Shortness Of Breath] : no shortness of breath [Cough] : no cough [Abdominal Pain] : no abdominal pain [Pelvic Pain] : no pelvic pain [Skin Lesions] : no skin lesions

## 2021-07-06 NOTE — ASSESSMENT
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  She completed 6 week course of nafcillin on 6/2. Now with recurrence of R hallux tenderness/swelling suggesting treatment failure. Previous isolates are susceptible to Bactrim, supporting failure of po therapy.  Would recommend evaluation with podiatry to assess for additional interventions.  \par Plan:\par - Continue Bactrim 1 DS q 12 h for now\par - CBC, CMP, ESR, CRP drawn and sent from office\par - F/U with Podiatrist today - Called Dr. Carolyn Pradhan ([phone]) - message left asking for return call.\par Follow up dependent on course. Will contact patient's daughter with results.\par \par Addendum:  Podiatry office is closed for the week. She will therefore present to ED for evaluation.  Called ED to notify.

## 2021-07-06 NOTE — PHYSICAL THERAPY INITIAL EVALUATION ADULT - ADDITIONAL COMMENTS
Pt lives alone in an apartment accessible by elevator, denies using any AD/DME and was independent for prior level of function, no home health aide.

## 2021-07-06 NOTE — ED PROVIDER NOTE - IV ALTEPLASE DOOR HIDDEN
Scheduling Information  To schedule your CT/MRI scan, please contact [facility] at [phone] OR [facility] at [phone]    To schedule your Surgery, please contact our Specialty Schedulers at [phone]      ENT Clinic Locations Clinic Hours Telephone Number     [facility]  [address] [place] [address]   Monday:           1:00pm -- 5:00pm    Friday:              8:00am - 12:00pm   To schedule/reschedule an appointment with   Dr. Roe,   please contact our   Specialty Scheduling Department at:     [phone]       [facility]  [address] [place] [address] Tuesday:          8:00am -- 2:00pm         Urgent Care Locations Clinic Hours Telephone Numbers     [facility]  [address] [place] [address]     Monday-Friday:     11:00am - 9:00pm    Saturday-Sunday:  9:00am - 5:00pm   [phone]     [facility]  [address] [place] [address]     Monday-Friday:      5:00pm - 9:00pm     Saturday-Sunday:  9:00am - 5:00pm   [phone]           
show

## 2021-07-06 NOTE — H&P ADULT - NSICDXPASTMEDICALHX_GEN_ALL_CORE_FT
PAST MEDICAL HISTORY:  Asthma never hospitalized    Diabetes Basal insulin    High cholesterol     Hypertension

## 2021-07-06 NOTE — PHYSICAL THERAPY INITIAL EVALUATION ADULT - PHYSICAL ASSIST/NONPHYSICAL ASSIST: GAIT, REHAB EVAL
verbal cues/nonverbal cues (demo/gestures)/1 person assist supervision/verbal cues/nonverbal cues (demo/gestures)

## 2021-07-06 NOTE — H&P ADULT - ASSESSMENT
71F PMH DM controlled by basal insulin with prior osteomyelitis of R toe (IV Nafcillin 6 weeks) presented for pain and swelling of R toe.

## 2021-07-07 ENCOUNTER — TRANSCRIPTION ENCOUNTER (OUTPATIENT)
Age: 71
End: 2021-07-07

## 2021-07-07 LAB
A1C WITH ESTIMATED AVERAGE GLUCOSE RESULT: 5.5 % — SIGNIFICANT CHANGE UP (ref 4–5.6)
ALBUMIN SERPL ELPH-MCNC: 4.4 G/DL
ALP BLD-CCNC: 87 U/L
ALT SERPL-CCNC: 19 U/L
ANION GAP SERPL CALC-SCNC: 10 MMOL/L — SIGNIFICANT CHANGE UP (ref 5–17)
ANION GAP SERPL CALC-SCNC: 11 MMOL/L
AST SERPL-CCNC: 20 U/L
BASOPHILS # BLD AUTO: 0.04 K/UL
BASOPHILS # BLD AUTO: 0.04 K/UL — SIGNIFICANT CHANGE UP (ref 0–0.2)
BASOPHILS NFR BLD AUTO: 0.6 %
BASOPHILS NFR BLD AUTO: 0.9 % — SIGNIFICANT CHANGE UP (ref 0–2)
BILIRUB SERPL-MCNC: 0.4 MG/DL
BUN SERPL-MCNC: 11 MG/DL
BUN SERPL-MCNC: 13 MG/DL — SIGNIFICANT CHANGE UP (ref 7–23)
CALCIUM SERPL-MCNC: 9.1 MG/DL — SIGNIFICANT CHANGE UP (ref 8.4–10.5)
CALCIUM SERPL-MCNC: 9.7 MG/DL
CHLORIDE SERPL-SCNC: 105 MMOL/L
CHLORIDE SERPL-SCNC: 107 MMOL/L — SIGNIFICANT CHANGE UP (ref 96–108)
CO2 SERPL-SCNC: 24 MMOL/L
CO2 SERPL-SCNC: 26 MMOL/L — SIGNIFICANT CHANGE UP (ref 22–31)
COVID-19 SPIKE DOMAIN AB INTERP: POSITIVE
COVID-19 SPIKE DOMAIN ANTIBODY RESULT: >250 U/ML — HIGH
CREAT SERPL-MCNC: 0.8 MG/DL
CREAT SERPL-MCNC: 0.88 MG/DL — SIGNIFICANT CHANGE UP (ref 0.5–1.3)
CRP SERPL-MCNC: 0.9 MG/L — SIGNIFICANT CHANGE UP (ref 0–4)
CRP SERPL-MCNC: <3 MG/L
EOSINOPHIL # BLD AUTO: 0.12 K/UL
EOSINOPHIL # BLD AUTO: 0.13 K/UL — SIGNIFICANT CHANGE UP (ref 0–0.5)
EOSINOPHIL NFR BLD AUTO: 1.8 %
EOSINOPHIL NFR BLD AUTO: 2.9 % — SIGNIFICANT CHANGE UP (ref 0–6)
ERYTHROCYTE [SEDIMENTATION RATE] IN BLOOD BY WESTERGREN METHOD: 2 MM/HR
ERYTHROCYTE [SEDIMENTATION RATE] IN BLOOD: 7 MM/HR — SIGNIFICANT CHANGE UP
ESTIMATED AVERAGE GLUCOSE: 111 MG/DL — SIGNIFICANT CHANGE UP (ref 68–114)
GLUCOSE BLDC GLUCOMTR-MCNC: 103 MG/DL — HIGH (ref 70–99)
GLUCOSE BLDC GLUCOMTR-MCNC: 114 MG/DL — HIGH (ref 70–99)
GLUCOSE BLDC GLUCOMTR-MCNC: 150 MG/DL — HIGH (ref 70–99)
GLUCOSE BLDC GLUCOMTR-MCNC: 179 MG/DL — HIGH (ref 70–99)
GLUCOSE SERPL-MCNC: 131 MG/DL
GLUCOSE SERPL-MCNC: 84 MG/DL — SIGNIFICANT CHANGE UP (ref 70–99)
HCT VFR BLD CALC: 36.2 % — SIGNIFICANT CHANGE UP (ref 34.5–45)
HCT VFR BLD CALC: 38.3 %
HGB BLD-MCNC: 11.8 G/DL — SIGNIFICANT CHANGE UP (ref 11.5–15.5)
HGB BLD-MCNC: 12.6 G/DL
IMM GRANULOCYTES NFR BLD AUTO: 0.3 %
IMM GRANULOCYTES NFR BLD AUTO: 0.5 % — SIGNIFICANT CHANGE UP (ref 0–1.5)
LYMPHOCYTES # BLD AUTO: 0.65 K/UL — LOW (ref 1–3.3)
LYMPHOCYTES # BLD AUTO: 1.12 K/UL
LYMPHOCYTES # BLD AUTO: 14.7 % — SIGNIFICANT CHANGE UP (ref 13–44)
LYMPHOCYTES NFR BLD AUTO: 16.5 %
MAGNESIUM SERPL-MCNC: 2.1 MG/DL — SIGNIFICANT CHANGE UP (ref 1.6–2.6)
MAN DIFF?: NORMAL
MCHC RBC-ENTMCNC: 31 PG — SIGNIFICANT CHANGE UP (ref 27–34)
MCHC RBC-ENTMCNC: 31.8 PG
MCHC RBC-ENTMCNC: 32.6 GM/DL — SIGNIFICANT CHANGE UP (ref 32–36)
MCHC RBC-ENTMCNC: 32.9 GM/DL
MCV RBC AUTO: 95 FL — SIGNIFICANT CHANGE UP (ref 80–100)
MCV RBC AUTO: 96.7 FL
MONOCYTES # BLD AUTO: 0.34 K/UL — SIGNIFICANT CHANGE UP (ref 0–0.9)
MONOCYTES # BLD AUTO: 0.44 K/UL
MONOCYTES NFR BLD AUTO: 6.5 %
MONOCYTES NFR BLD AUTO: 7.7 % — SIGNIFICANT CHANGE UP (ref 2–14)
NEUTROPHILS # BLD AUTO: 3.24 K/UL — SIGNIFICANT CHANGE UP (ref 1.8–7.4)
NEUTROPHILS # BLD AUTO: 5.03 K/UL
NEUTROPHILS NFR BLD AUTO: 73.3 % — SIGNIFICANT CHANGE UP (ref 43–77)
NEUTROPHILS NFR BLD AUTO: 74.3 %
NRBC # BLD: 0 /100 WBCS — SIGNIFICANT CHANGE UP (ref 0–0)
PLATELET # BLD AUTO: 171 K/UL — SIGNIFICANT CHANGE UP (ref 150–400)
PLATELET # BLD AUTO: 207 K/UL
POTASSIUM SERPL-MCNC: 4.4 MMOL/L — SIGNIFICANT CHANGE UP (ref 3.5–5.3)
POTASSIUM SERPL-SCNC: 4.4 MMOL/L — SIGNIFICANT CHANGE UP (ref 3.5–5.3)
POTASSIUM SERPL-SCNC: 4.8 MMOL/L
PROT SERPL-MCNC: 6.6 G/DL
RBC # BLD: 3.81 M/UL — SIGNIFICANT CHANGE UP (ref 3.8–5.2)
RBC # BLD: 3.96 M/UL
RBC # FLD: 12.9 %
RBC # FLD: 12.9 % — SIGNIFICANT CHANGE UP (ref 10.3–14.5)
SARS-COV-2 IGG+IGM SERPL QL IA: >250 U/ML — HIGH
SARS-COV-2 IGG+IGM SERPL QL IA: POSITIVE
SODIUM SERPL-SCNC: 140 MMOL/L
SODIUM SERPL-SCNC: 143 MMOL/L — SIGNIFICANT CHANGE UP (ref 135–145)
SURGICAL PATHOLOGY STUDY: SIGNIFICANT CHANGE UP
WBC # BLD: 4.42 K/UL — SIGNIFICANT CHANGE UP (ref 3.8–10.5)
WBC # FLD AUTO: 4.42 K/UL — SIGNIFICANT CHANGE UP (ref 3.8–10.5)
WBC # FLD AUTO: 6.77 K/UL

## 2021-07-07 PROCEDURE — 99232 SBSQ HOSP IP/OBS MODERATE 35: CPT

## 2021-07-07 PROCEDURE — 99233 SBSQ HOSP IP/OBS HIGH 50: CPT | Mod: GC

## 2021-07-07 RX ADMIN — INSULIN GLARGINE 23 UNIT(S): 100 INJECTION, SOLUTION SUBCUTANEOUS at 22:24

## 2021-07-07 RX ADMIN — Medication 650 MILLIGRAM(S): at 01:15

## 2021-07-07 RX ADMIN — NAFCILLIN 200 GRAM(S): 10 INJECTION, POWDER, FOR SOLUTION INTRAVENOUS at 14:15

## 2021-07-07 RX ADMIN — Medication 650 MILLIGRAM(S): at 02:15

## 2021-07-07 RX ADMIN — ATORVASTATIN CALCIUM 40 MILLIGRAM(S): 80 TABLET, FILM COATED ORAL at 21:43

## 2021-07-07 RX ADMIN — ENOXAPARIN SODIUM 40 MILLIGRAM(S): 100 INJECTION SUBCUTANEOUS at 14:15

## 2021-07-07 RX ADMIN — NAFCILLIN 200 GRAM(S): 10 INJECTION, POWDER, FOR SOLUTION INTRAVENOUS at 01:11

## 2021-07-07 RX ADMIN — NAFCILLIN 200 GRAM(S): 10 INJECTION, POWDER, FOR SOLUTION INTRAVENOUS at 21:43

## 2021-07-07 RX ADMIN — NAFCILLIN 200 GRAM(S): 10 INJECTION, POWDER, FOR SOLUTION INTRAVENOUS at 18:50

## 2021-07-07 RX ADMIN — NAFCILLIN 200 GRAM(S): 10 INJECTION, POWDER, FOR SOLUTION INTRAVENOUS at 10:13

## 2021-07-07 RX ADMIN — NAFCILLIN 200 GRAM(S): 10 INJECTION, POWDER, FOR SOLUTION INTRAVENOUS at 06:20

## 2021-07-07 RX ADMIN — Medication 2: at 22:23

## 2021-07-07 NOTE — PROGRESS NOTE ADULT - PROBLEM SELECTOR PLAN 3
On Basaglar 30 at home  A1C 3 months ago was 6.7; seems well controlled  -Inpatient ISS + lantus 23U

## 2021-07-07 NOTE — DISCHARGE NOTE PROVIDER - PROVIDER TOKENS
PROVIDER:[TOKEN:[[id number]]] PROVIDER:[TOKEN:[[id number]]],PROVIDER:[TOKEN:[[id number]],FOLLOWUP:[1 week]] PROVIDER:[TOKEN:[[id number]],FOLLOWUP:[2 weeks]],FREE:[LAST:[Jaden],FIRST:[Kane],PHONE:[phone]],FAX:[(   )    -],ADDRESS:[[address] [place] [address]],FOLLOWUP:[1 week]]

## 2021-07-07 NOTE — DISCHARGE NOTE PROVIDER - CARE PROVIDERS DIRECT ADDRESSES
,[email or web address]

## 2021-07-07 NOTE — PROGRESS NOTE ADULT - ASSESSMENT
A 71F with PMH of HTN, HLD, asthma, DM, and previous diagnosis of R great toe osteomyelitis treated with IV Nafcillin for 6 weeks presents with pain of the R toe. X ray showed chronic osteomyelitis and wound cultures expressed no growth.  A 71F with PMH of HTN, HLD, asthma, DM, and previous diagnosis of R great toe osteomyelitis treated with IV Nafcillin for 6 weeks presents with pain of the R toe. X ray showed chronic osteomyelitis and wound cultures expressed no growth. Possible acute on chronic osteomyelitis  A 71F with PMH of HTN, HLD, asthma, DM, and previous diagnosis of R great toe osteomyelitis sp antibiotics presents with pain of the R toe being treated for possible acute on chronic osteomyelitis.

## 2021-07-07 NOTE — DISCHARGE NOTE PROVIDER - HOSPITAL COURSE
#Discharge: do not delete    Patient is 70 yo F with past medical history of T2DM, OM (last admission in April), HLD and HTN   Presented with right toe pain worsening on outpatient PO antibiotics, found to have chronic OM of R Toe.     Problem List/Main Diagnoses (system-based):   #R/O Osteomyelitis of R Foot   Pt was previously diagnosed with acute osteomyelitis in April and was given 6 weeks of Nafcillin for MSSA.   On this admission X-ray showed destruction of the R great toe consistent with chronic osteomyelitis. I&D and bone biopsy was done in the ED.  ID and podiatry was consulted during this admission.   Pt was started on nafcillin 2g q4hr for the osteomyelitis.  Pt was given Acetaminophen 650mg q6h for pain control.   Bone biopsy was negative   Wound culture was negative      Inpatient treatment course: As above   New medications:   Labs to be followed outpatient:   Exam to be followed outpatient:    #Discharge: do not delete    Patient is 72 yo F with past medical history of T2DM, OM (last admission in April), HLD and HTN   Presented with right toe pain worsening on outpatient PO antibiotics, found to have chronic OM of R Toe.     Problem List/Main Diagnoses (system-based):   #R/O Osteomyelitis of R Foot   Pt was previously diagnosed with acute osteomyelitis in April and was given 6 weeks of Nafcillin for MSSA.   On this admission X-ray showed destruction of the R great toe consistent with chronic osteomyelitis. I&D and bone biopsy was done in the ED.  ID and podiatry was consulted during this admission.   Pt was started on nafcillin 2g q4hr for the osteomyelitis.  Pt was given Acetaminophen 650mg q6h for pain control.   Bone biopsy was negative   Wound culture was negative    #Diabetes   Basaglar 30 at home and A1C 3 months ago 6.7  Inpatient was treated with ISS + Lantis 23U    #Hypertension   Home meds valsartan 80mg  Use home meds if hypertensive      #High Cholesterol  Home med atorvastatin 40mg QD  continued home meds    #Asthma  Home med    Inpatient treatment course: As above   New medications:   Labs to be followed outpatient:   Exam to be followed outpatient:    #Discharge: do not delete    Patient is 72 yo F with past medical history of T2DM, OM (last admission in April), HLD and HTN   Presented with right toe pain worsening on outpatient PO antibiotics, found to have chronic OM of R Toe.     Problem List/Main Diagnoses (system-based):   #R/O Osteomyelitis of R Foot   Pt was previously diagnosed with acute osteomyelitis in April and was given 6 weeks of Nafcillin for MSSA.   On this admission X-ray showed destruction of the R great toe consistent with chronic osteomyelitis. I&D and bone biopsy was done in the ED.  ID and podiatry was consulted during this admission.   Pt was started on nafcillin 2g q4hr for the osteomyelitis and continued fro 4 days.   Pt was given Acetaminophen 650mg q6h for pain control.   Bone biopsy was negative   Wound culture was negative  MRI     #Diabetes   Basaglar 30 at home and A1C 3 months ago 6.7  Initially Inpatient was treated with ISS + Lantis 23U  Lantis was changed to 18U on day 3 of admission 7/8/21.    #Hypertension   Home meds valsartan 80mg  Use home meds if hypertensive      #High Cholesterol  Home med atorvastatin 40mg QD  -continued home meds    #Asthma  Home meds symbicoty and ellipta   -Continued home inhalers.    Inpatient treatment course: As above   New medications:   Labs to be followed outpatient:   Exam to be followed outpatient:    #Discharge: do not delete    Patient is 70 yo F with past medical history of T2DM, OM (last admission in April), HLD and HTN   Presented with right toe pain worsening on outpatient PO antibiotics, found to have chronic OM of R Toe.     Problem List/Main Diagnoses (system-based):   #R/O Osteomyelitis of R Foot   Pt was previously diagnosed with acute osteomyelitis in April and was given 6 weeks of Nafcillin for MSSA.   On this admission X-ray showed destruction of the R great toe consistent with chronic osteomyelitis. I&D and bone biopsy was done in the ED.  ID and podiatry was consulted during this admission.   Pt was started on nafcillin 2g q4hr for the osteomyelitis and continued fro 4 days.   Pt was given Acetaminophen 650mg q6h for pain control.   Bone biopsy was negative   Wound culture was negative    On discharge  -MRI outpatient  -Change to antibiotics to PO     #Diabetes   Basaglar 30 at home and A1C 3 months ago 6.7  Initially Inpatient was treated with ISS + Lantis 23U  Lantis was changed to 18U on day 3 of admission 7/8/21.    #Hypertension   Home meds valsartan 80mg  Use home meds if hypertensive      #High Cholesterol  Home med atorvastatin 40mg QD  -continued home meds    #Asthma  Home meds symbicoty and ellipta   -Continued home inhalers.    Inpatient treatment course: As above   New medications:   Labs to be followed outpatient:   Exam to be followed outpatient:    #Discharge: do not delete    Patient is 72 yo F with past medical history of T2DM, OM (last admission in April), HLD and HTN   Presented with right toe pain worsening on outpatient PO antibiotics, found to have chronic OM of R Toe.     Problem List/Main Diagnoses (system-based):   #R/O Osteomyelitis of R Foot   Pt was previously diagnosed with acute osteomyelitis in April and was given 6 weeks of Nafcillin for MSSA.   On this admission X-ray showed destruction of the R great toe consistent with chronic osteomyelitis. I&D and bone biopsy was done in the ED.  ID and podiatry was consulted during this admission.   Pt was started on nafcillin 2g q4hr for the osteomyelitis and continued for X days.   Pt was given Acetaminophen 650mg q6h for pain control.   Bone biopsy was negative   Wound culture was negative  MRI was done on _____    #Diabetes   Basaglar 30 at home and A1C 3 months ago 6.7  Initially Inpatient was treated with ISS + Lantis 23U  Lantis was changed to 18U on day 3 of admission 7/8/21.    #Hypertension   Home meds valsartan 80mg  Use home meds if hypertensive      #High Cholesterol  Home med atorvastatin 40mg QD  -continued home meds    #Asthma  Home meds symbicoty and ellipta   -Continued home inhalers.    Inpatient treatment course: As above   New medications:   Labs to be followed outpatient:   Exam to be followed outpatient:    #Discharge: do not delete    Patient is 72 yo F with past medical history of T2DM, OM (last admission in April), HLD and HTN   Presented with right toe pain worsening on outpatient PO antibiotics, found to have chronic OM of R Toe.     Problem List/Main Diagnoses (system-based):   #R/O Osteomyelitis of R Foot   Pt was previously diagnosed with acute osteomyelitis in April and was given 6 weeks of Nafcillin for MSSA.   On this admission X-ray showed destruction of the R great toe consistent with chronic osteomyelitis. I&D and bone biopsy was done in the ED.  ID and podiatry was consulted during this admission.   Pt was started on nafcillin 2g q4hr for the osteomyelitis and continued for X days.   Pt was given Acetaminophen 650mg q6h for pain control.   Bone biopsy was negative   Wound culture was negative  MRI was done on 7/10    #Diabetes   Basaglar 30 at home and A1C 3 months ago 6.7  Initially Inpatient was treated with ISS + Lantis 23U  Lantis was changed to 18U on day 3 of admission 7/8/21.    #Hypertension   Home meds valsartan 80mg  Use home meds if hypertensive    #High Cholesterol  Home med atorvastatin 40mg QD  -continued home meds    #Asthma  Home meds symbicoty and ellipta   -Continued home inhalers.    Inpatient treatment course: As above   New medications: keflex 500mg PO q6h until 8/16/2021  Labs to be followed outpatient: weekly CBC, CMP, ESR, CRP  Exam to be followed outpatient: appt w/ Dr. Dominguez (ID)   #Discharge: do not delete    Patient is 72 yo F with past medical history of T2DM, OM (last admission in April), HLD and HTN   Presented with right toe pain worsening on outpatient PO antibiotics, found to have chronic OM of R Toe.     Problem List/Main Diagnoses (system-based):   #R/O Osteomyelitis of R Foot   Pt was previously diagnosed with acute osteomyelitis in April and was given 6 weeks of Nafcillin for MSSA.   On this admission X-ray showed destruction of the R great toe consistent with chronic osteomyelitis. I&D and bone biopsy was done in the ED.  ID and podiatry was consulted during this admission.   Pt was started on nafcillin 2g q4hr for the osteomyelitis   Pt was given Acetaminophen 650mg q6h for pain control.   Bone biopsy was negative   Wound culture was negative  MRI was done on 7/10 with signs of OM, however prior abscess now resolved  Transition to PO keflex upon discharge to complete course until 8/16     #Diabetes Mellitis   Basaglar 30 at home and A1C 3 months ago 6.7  Initially Inpatient was treated with ISS + Lantis 23U  Lantis was changed to 18U on day 3 of admission 7/8/21.    #Hypertension   Home meds valsartan 80mg    #High Cholesterol  Home med atorvastatin 40mg QD  -continued home meds    #Asthma  Home meds symbicoty and ellipta   -Continued home inhalers.    Inpatient treatment course: As above   New medications: Keflex 500mg PO q6h until 8/16/2021  Labs to be followed outpatient: Weekly CBC, CMP, ESR, CRP  Exam to be followed outpatient: Appt w/ Dr. Dominguez (ID) and Podiatry    #Discharge: do not delete    Patient is 72 yo F with past medical history of T2DM, OM (last admission in April), HLD and HTN   Presented with right toe pain worsening on outpatient PO antibiotics, found to have chronic OM of R Toe.     Problem List/Main Diagnoses (system-based):   #R/O Osteomyelitis of R Foot   Pt was previously diagnosed with acute osteomyelitis in April and was given 6 weeks of Nafcillin for MSSA.   On this admission X-ray showed destruction of the R great toe consistent with chronic osteomyelitis. I&D and bone biopsy was done in the ED.  ID and podiatry was consulted during this admission.   Pt was started on nafcillin 2g q4hr for the osteomyelitis while inpatient   Bone biopsy was negative   Wound culture was negative  MRI was done on 7/10 with signs of OM, however prior abscess now resolved  Transition to PO keflex upon discharge to complete course until 8/16     #Diabetes Mellitis   Basaglar 30 at home and A1C 3 months ago 6.7  Initially Inpatient was treated with ISS + Lantis 23U  Lantis was changed to 18U on day 3 of admission 7/8/21.    #Hypertension   Home meds valsartan 80mg    #High Cholesterol  Home med atorvastatin 40mg QD  -continued home meds    #Asthma  Home meds symbicoty and ellipta   -Continued home inhalers.    Inpatient treatment course: As above     New medications: Keflex 500mg PO q6h until 8/16/2021  Labs to be followed outpatient: Weekly CBC, CMP, ESR, CRP (Fax to [phone])  Exam to be followed outpatient: Appt w/ Dr. Dominguez (ID) and Podiatry, Remove toe stitches with Dr. Stanley on 7/20.

## 2021-07-07 NOTE — DISCHARGE NOTE PROVIDER - NSDCFUADDAPPT_GEN_ALL_CORE_FT
Please schedule appointments with Dr. Dominguez and your podiatrist for within 1 week og leaving the hospital Dr. Dominguez's office will reach out to you upon discharge to schedule a follow up appointment.     Please make sure you make an appointment with your Podiatrist within 1-2 weeks of discharge.  Dr. Dominguez's office will reach out to you upon discharge to schedule a follow up appointment.     Dr. Stanley's office is closed at the time of your discharge, therefore we were unable to schedule you the appointment. Please make sure you make an appointment with your Podiatrist, Dr. Stanley, on 7/20/2021. He will continue to monitor your foot infection and remove your stitches at that time.

## 2021-07-07 NOTE — DISCHARGE NOTE PROVIDER - CARE PROVIDER_API CALL
Kathy Dominguez)  Infectious Disease; Internal Medicine  [address]  [place], [place] [address]  Phone: [phone]  Fax: [phone]  Follow Up Time:    Kathy Dominguez)  Infectious Disease; Internal Medicine  [address], [address] [place] [address]  Phone: [phone]  Fax: [phone]  Follow Up Time:     Kane Stanley (DPM)  Surgery Orthopaedic Surgery  [address] [facility], [address]  [place] [address]  Phone: [phone]  Fax: [phone]  Follow Up Time: 1 week   Kathy Dominguez)  Infectious Disease; Internal Medicine  [address], [address] [place] [address]  Phone: [phone]  Fax: [phone]  Follow Up Time: 2 weeks    Kane Stanley  [address] [address]  Phone: [phone]  Fax: (   )    -  Follow Up Time: 1 week

## 2021-07-07 NOTE — DISCHARGE NOTE PROVIDER - NSDCCPTREATMENT_GEN_ALL_CORE_FT
PRINCIPAL PROCEDURE  Procedure: MRI foot right  Findings and Treatment: Osteomyelitis of the first digit distal phalanx with bony destruction of the tuft.  Prior intraosseous abscess has resolved compared to previous MRI. No soft tissue abscess

## 2021-07-07 NOTE — PROGRESS NOTE ADULT - SUBJECTIVE AND OBJECTIVE BOX
Patient is a 71y old  Female who presents with a chief complaint of Toe pain (07 Jul 2021 11:15)      INTERVAL HPI/ OVERNIGHT EVENTS: KIRAN O/N. Pt states that she is feeling well this afternoon, denies pain to right foot.       LABS                        11.8   4.42  )-----------( 171      ( 07 Jul 2021 09:29 )             36.2     07-07    143  |  107  |  13  ----------------------------<  84  4.4   |  26  |  0.88    Ca    9.1      07 Jul 2021 09:29  Mg     2.1     07-07    TPro  7.6  /  Alb  4.6  /  TBili  0.4  /  DBili  x   /  AST  27  /  ALT  21  /  AlkPhos  87  07-06      ESR: 7  CRP: --  07-07 @ 09:29  ESR: 5  CRP: --  07-06 @ 13:11    ICU Vital Signs Last 24 Hrs  T(C): 37 (07 Jul 2021 09:06), Max: 37.1 (06 Jul 2021 17:31)  T(F): 98.6 (07 Jul 2021 09:06), Max: 98.7 (06 Jul 2021 17:31)  HR: 67 (07 Jul 2021 10:50) (59 - 72)  BP: 122/66 (07 Jul 2021 10:50) (92/46 - 126/87)  BP(mean): --  ABP: --  ABP(mean): --  RR: 18 (07 Jul 2021 09:06) (17 - 18)  SpO2: 97% (07 Jul 2021 09:06) (95% - 99%)      RADIOLOGY  < from: Xray Toes, Right Foot (07.06.21 @ 12:21) >    IMPRESSION: Frontal, lateral and oblique views of the right great toe demonstrate interval increasing sclerosis and persistent lucency and fragmentation of the distal left of the great toe with overlying skin defect, consistent with chronic osteomyelitis.    MRI is more sensitive examination for detection of osteomyelitis.    < end of copied text >      MICROBIOLOGY    Culture - Tissue with Gram Stain (collected 06 Jul 2021 14:33)  Source: .Tissue Right hallux bone  Gram Stain (06 Jul 2021 16:27):    No organisms seen    No WBC's seen.  Preliminary Report (07 Jul 2021 08:12):    No growth to date      PHYSICAL EXAM  Lower Extremity Focused  Vasc: DP/PT 2/4 B/L. CFT brisk x 10. TG cool to cool. Mild edema to R hallux.   Derm: Mild erythema to right hallux. Healed stab incision to medial distal tip of right hallux.   Neuro: Protective sensation grossly intact b/l  MSK: 5/5 muscle strength in all crural compartments b/l. Ambulates independently w/o assistive device.

## 2021-07-07 NOTE — DISCHARGE NOTE PROVIDER - NSDCCPCAREPLAN_GEN_ALL_CORE_FT
PRINCIPAL DISCHARGE DIAGNOSIS  Diagnosis: Osteomyelitis of toe of right foot  Assessment and Plan of Treatment: The foot pain was due to an infection in the bone of your right toe. You were treated with antibiotics to help the foot heal and you will continue to take the antibiotics as you leave the hospital. The name of the antibiotic is nafcillin and you will take it for x number of days every _ hours. Follow up with your podiatrist outpatient on ___  If the pain in the foot gets worse or if there is increased swelling, come back to the hospital.       PRINCIPAL DISCHARGE DIAGNOSIS  Diagnosis: Osteomyelitis of toe of right foot  Assessment and Plan of Treatment: The foot pain was due to an infection in the bone of your right toe. You were treated with antibiotics to help the foot heal for 4 days. The name of the antibiotic was Nafcillin.   Follow up with your podiatrist outpatient.  If the pain in the foot gets worse or if there is increased swelling, come back to the hospital.       PRINCIPAL DISCHARGE DIAGNOSIS  Diagnosis: Osteomyelitis of toe of right foot  Assessment and Plan of Treatment: The foot pain was due to an infection in the bone of your right toe. You were treated with antibiotics to help the foot heal for 4 days. The name of the antibiotic was Nafcillin.   We gave you ____ when you left the hospital.   Follow up with your podiatrist outpatient.  We are also sending you to get an MRI after you leave the hospital.  If the pain in the foot gets worse or if there is increased swelling, come back to the hospital.       PRINCIPAL DISCHARGE DIAGNOSIS  Diagnosis: Osteomyelitis of toe of right foot  Assessment and Plan of Treatment: The foot pain was due to an infection in the bone of your right toe. You were treated with antibiotics to help the foot heal for 4 days. The name of the antibiotic was Nafcillin.   We gave you ____ when you left the hospital.   Follow up with your podiatrist outpatient. An appointment was made for ____  If the pain in the foot gets worse or if there is increased swelling, come back to the hospital.       PRINCIPAL DISCHARGE DIAGNOSIS  Diagnosis: Osteomyelitis of toe of right foot  Assessment and Plan of Treatment: Your foot pain was due to an infection in the bone of your first right toe called osteomyelitis. Osteomyelitis is an infection in a bone caused by bacteria or fungi. Infections can reach a bone by traveling through the bloodstream or spreading from nearby tissue. This type of infection can also begin in the bone itself if an injury exposes the bone to germs.  For this infection you were treated with the IV antibiotic nafcillin for 4 days to help the toe heal.  We will continue to treat your infection with an oral antibiotic called Keflex (cefalexin) that you should continue to take every 8 hours for the next 36 days. Your last dose of this medication will be on August 16.   Please make an ppointment with both your podiatrist and Dr. Dominguez for within 1 week of leaving the hospital.   Please call your podiatrist or return to the hospital if the pain in your toe gets worse, you notice more swelling or redness, or you develop a fever above 100.4.       PRINCIPAL DISCHARGE DIAGNOSIS  Diagnosis: Osteomyelitis of toe of right foot  Assessment and Plan of Treatment: Your foot pain was due to an infection in the bone of your first right toe called osteomyelitis. Osteomyelitis is an infection in a bone caused by bacteria or fungi. Infections can reach a bone by traveling through the bloodstream or spreading from nearby tissue. This type of infection can also begin in the bone itself if an injury exposes the bone to germs.  For this infection you were treated with the IV antibiotic nafcillin for 4 days to help the toe heal.  We will continue to treat your infection with an oral antibiotic called Keflex (cefalexin) that you should continue to take by mouth every 8 hours for the next 36 days. Your last dose of this medication will be on August 16.   We will also send you home with a prescription for weekly labs, which include CBC, CMP, ESR, CRP. These labs should be faxed to Dr. Dominguez's office at [phone].  Dr. Dominguez's office will call you to schedule an appointment for within 1-2 weeks of leaving the hospital.   Please follow up with Podiatry within 1-2 weeks of discharge as well.   Please call your Podiatrist or return to the hospital if the pain in your toe gets worse, you notice more swelling or redness, or you develop new fevers.       PRINCIPAL DISCHARGE DIAGNOSIS  Diagnosis: Osteomyelitis of toe of right foot  Assessment and Plan of Treatment: Your foot pain was due to an infection in the bone of your first right toe called osteomyelitis. Osteomyelitis is an infection in a bone caused by bacteria or fungi. Infections can reach a bone by traveling through the bloodstream or spreading from nearby tissue. This type of infection can also begin in the bone itself if an injury exposes the bone to germs.  For this infection you were treated with the IV antibiotic nafcillin for 4 days to help the toe heal.  We will continue to treat your infection with an oral antibiotic called Keflex (cefalexin) that you should continue to take by mouth every 8 hours for the next 36 days. Your last dose of this medication will be on August 16.   We will also send you home with a prescription for weekly labs, which include CBC, CMP, ESR, CRP. These labs should be faxed to Dr. Dominguez's office at [phone].  --Dr. Dominguez's office will call you to schedule an appointment for within 1-2 weeks of leaving the hospital.   --Dr. Stanley's office is closed at the time of your discharge, therefore we were unable to schedule you the appointment. Please make sure you make an appointment with your Podiatrist, Dr. Stanley, on 7/20/2021. Office number is [phone] (extention [phone]). He will continue to monitor your foot infection and remove your stitches at that time.   Please call your Podiatrist or return to the hospital if the pain in your toe gets worse, you notice more swelling or redness, or you develop new fevers.

## 2021-07-07 NOTE — PROGRESS NOTE ADULT - PROBLEM SELECTOR PLAN 2
Pain associated with osteomyelitis of R toe  - Acetaminophen 650mg q6h PRN  - D/C morphine Pain associated with osteomyelitis of R toe  - Acetaminophen 650mg q6h PRN

## 2021-07-07 NOTE — PROGRESS NOTE ADULT - SUBJECTIVE AND OBJECTIVE BOX
INTERVAL HPI/OVERNIGHT EVENTS:    Patient was seen and examined at bedside.  Feels much better.  Has decreased pain     CONSTITUTIONAL:  Negative fever or chills, feels well, good appetite  EYES:  Negative  blurry vision or double vision  CARDIOVASCULAR:  Negative for chest pain or palpitations  RESPIRATORY:  Negative for cough, wheezing, or SOB   GASTROINTESTINAL:  Negative for nausea, vomiting, diarrhea, constipation, or abdominal pain  GENITOURINARY:  Negative frequency, urgency or dysuria  NEUROLOGIC:  No headache, confusion, dizziness, lightheadedness      ANTIBIOTICS/RELEVANT:    MEDICATIONS  (STANDING):  atorvastatin 40 milliGRAM(s) Oral at bedtime  dextrose 40% Gel 15 Gram(s) Oral once  dextrose 5%. 1000 milliLiter(s) (50 mL/Hr) IV Continuous <Continuous>  dextrose 5%. 1000 milliLiter(s) (100 mL/Hr) IV Continuous <Continuous>  dextrose 50% Injectable 25 Gram(s) IV Push once  dextrose 50% Injectable 12.5 Gram(s) IV Push once  dextrose 50% Injectable 25 Gram(s) IV Push once  enoxaparin Injectable 40 milliGRAM(s) SubCutaneous every 24 hours  glucagon  Injectable 1 milliGRAM(s) IntraMuscular once  insulin glargine Injectable (LANTUS) 23 Unit(s) SubCutaneous at bedtime  insulin lispro (ADMELOG) corrective regimen sliding scale   SubCutaneous Before meals and at bedtime  nafcillin  IVPB 2 Gram(s) IV Intermittent every 4 hours    MEDICATIONS  (PRN):  acetaminophen   Tablet .. 650 milliGRAM(s) Oral every 6 hours PRN Mild Pain (1 - 3), Moderate Pain (4 - 6)        Vital Signs Last 24 Hrs  T(C): 37 (07 Jul 2021 09:06), Max: 37.1 (06 Jul 2021 17:31)  T(F): 98.6 (07 Jul 2021 09:06), Max: 98.7 (06 Jul 2021 17:31)  HR: 67 (07 Jul 2021 10:50) (59 - 72)  BP: 122/66 (07 Jul 2021 10:50) (92/46 - 126/87)  BP(mean): --  RR: 18 (07 Jul 2021 09:06) (17 - 18)  SpO2: 97% (07 Jul 2021 09:06) (95% - 99%)    PHYSICAL EXAM:  Constitutional:  non-toxic, no distress  Eyes: no icterus   Ear/Nose/Throat: no oral lesion  Neck:  supple  Respiratory: CTA mihir  Cardiovascular: S1S2 RRR, no murmurs  Gastrointestinal:soft, (+) BS, no HSM  Extremities:  right foot with dressing in place  Vascular: DP Pulse:	right normal; left normal      LABS:                        11.8   4.42  )-----------( 171      ( 07 Jul 2021 09:29 )             36.2     07-07    143  |  107  |  13  ----------------------------<  84  4.4   |  26  |  0.88    Ca    9.1      07 Jul 2021 09:29  Mg     2.1     07-07    TPro  7.6  /  Alb  4.6  /  TBili  0.4  /  DBili  x   /  AST  27  /  ALT  21  /  AlkPhos  87  07-06          MICROBIOLOGY:    Culture - Tissue with Gram Stain (07.06.21 @ 14:33)    Gram Stain:   No organisms seen  No WBC's seen.    Specimen Source: .Tissue Right hallux bone    Culture Results:   No growth to date    Culture - Blood (07.06.21 @ 13:31)    Specimen Source: .Blood Blood-Peripheral    Culture Results:   No growth at 1 day.        RADIOLOGY & ADDITIONAL STUDIES:

## 2021-07-07 NOTE — PROGRESS NOTE ADULT - ASSESSMENT
70 y/o F w/ PMHx HTN, DM Type 2, HLD presents today w/ right great toe pain + erythema + edema; has a hx of OM s/p 6 wks of IV Nafcillin (4/21-6/2) sent in by Dr. Dominguez for podiatry evaluation. Podiatry consulted for mgmt of Right hallux cellulitis + osteomyelitis non-responsive to prior treatment. Pt is s/p bedside bone biopsy of Right hallux distal phalanx; stab incision closed w/ nylon suture.     P:   - Continue IV abx per ID recs  - Right hallux dressed with gauze, kerlix, ACE  - F/U R hallux distal phalanx micro & path - cx NGTD  - WBAT to R heel     Podiatry following

## 2021-07-07 NOTE — CONSULT NOTE ADULT - ASSESSMENT
per Internal Medicine    70 yo F PMH DM controlled by basal insulin with prior osteomyelitis of R toe (IV Nafcillin 6 weeks) presented for pain and swelling of R toe.     Problem/Plan - 1   ·  Problem: R/O Osteomyelitis of toe of right foot.  Plan: Does not meet SIRS. XR showed destruction of R great toe consistent with chronic OM, no gas. Treated at [facility] for infection of R foot (MSSA) 6 weeks of Nafcillin. April MRI Osteomyelitis of the first distal phalanx with osseous destruction and intraosseous abscess and mild synovitis of first interphalangeal joint without osseous changes of the proximal phalanx. XR was negative for OM. ESR wnl. No organisms seen on gram stain.  Possible surrounding soft tissue infection vs active osteo.  -Nafcillin 2g q4hr   -F/u wound culture and bone biopsy  - Follow ID and podiatry recs.     Problem/Plan - 2   ·  Problem: Toe pain, right.  Plan: Pain likely related to osteomyelitis of toe.  -Acetaminophen 650mg q6h PRN  -If pain not controlled by tylenol, low dose morphine for severe pain.     Problem/Plan - 3   ·  Problem: Diabetes.  Plan: On Basaglar 30 at home. A1C 3 months ago 6.7.  - Inpatient ISS + lantus 23U.     Problem/Plan - 4   ·  Problem: Hypertension.  Plan: Home med valsartan 80mg  -Hold home valsartan QD. Start if hypertensive.     Problem/Plan - 5   ·  Problem: High cholesterol.  Plan: -Home med atorvastatin 40mg QD  -Continue with home med.     Problem/Plan - 6   Problem: Asthma. Plan: Current smoker counseling offered. No hx hospitalizations, no SOB or wheezing. Home meds symbicoty and ellipta   -C/w home inhalers.    Problem/Plan - 7   ·  Problem: Prophylactic measure.  Plan: Fluids: None  Electrolytes: replete as necessary, K>4, Mg>2  Nutrition: consistent carb  Bowel Regimen: None  DVT ppx: lovenox 40  GI ppx: None  Code: DNR/DNI  Disposition: medical floor.

## 2021-07-07 NOTE — PROGRESS NOTE ADULT - PROBLEM SELECTOR PLAN 1
Pt was previously treated with 6 weeks for OM of the R toe for MSSA of Nafcillin.   X-ray showed R great toe deterioration consistent with chronic OM with no gas. ESR wnl. Pt did not meet SIRS criteria.   -Nafcillin 2g q4hr  -Would culture of right hallux showed no growth   -Blood cultures showed no growth  -Follow ID and podiatry recs

## 2021-07-07 NOTE — CONSULT NOTE ADULT - SUBJECTIVE AND OBJECTIVE BOX
Patient is a 71y old  Female who presents with a chief complaint of Toe pain (06 Jul 2021 15:27)       HPI:  70F PMHx HTN, DM (A1c 6.7), HLD, asthma with prior R great toe osteomyelitis in April treated with 6 week IV Nafcilin here for recurrence of toe pain and swelling. Describes pain as a "pulling" sensation 6-7/10 pain.  Around 26th evening patient noticed R toe was swollen and painful. Next morning, podiatrist performed I&D, drained pus, no culture taken, given Bactrim (1 week). Followed by Dr. Dominguez (ID) seen today who recommended pt come to ED, as podiatrist office closed.   Pt denied fever, chills, n/v, CP, SOB.        In the ED:    - VS: Tmax: 98.2, HR: 70, BP: 112/69, RR: 16, O2: 98% RA     - Pertinent Labs: WBC wnl, CRP wnl    - Imaging: CXR: XR toe showed destruction of R great toe consistent with chronic OM. CT: US: Cath: EKG:     - Treatment/interventions: ibuprofen 600mg x1, nafcillin 2g x1, loxenox 40mg x1, lidocaine local and ethyl chloride spray, I&D, Bone biopsy         (06 Jul 2021 14:13)      PAST MEDICAL & SURGICAL HISTORY:  Asthma  never hospitalized    Diabetes  Basal insulin    High cholesterol    Hypertension    H/O laminectomy  1990, 1991        MEDICATIONS  (STANDING):  atorvastatin 40 milliGRAM(s) Oral at bedtime  dextrose 40% Gel 15 Gram(s) Oral once  dextrose 5%. 1000 milliLiter(s) (50 mL/Hr) IV Continuous <Continuous>  dextrose 5%. 1000 milliLiter(s) (100 mL/Hr) IV Continuous <Continuous>  dextrose 50% Injectable 25 Gram(s) IV Push once  dextrose 50% Injectable 12.5 Gram(s) IV Push once  dextrose 50% Injectable 25 Gram(s) IV Push once  enoxaparin Injectable 40 milliGRAM(s) SubCutaneous every 24 hours  glucagon  Injectable 1 milliGRAM(s) IntraMuscular once  insulin glargine Injectable (LANTUS) 23 Unit(s) SubCutaneous at bedtime  insulin lispro (ADMELOG) corrective regimen sliding scale   SubCutaneous Before meals and at bedtime  nafcillin  IVPB 2 Gram(s) IV Intermittent every 4 hours    MEDICATIONS  (PRN):  acetaminophen   Tablet .. 650 milliGRAM(s) Oral every 6 hours PRN Mild Pain (1 - 3), Moderate Pain (4 - 6)  morphine  - Injectable 1 milliGRAM(s) IV Push every 6 hours PRN Severe Pain (7 - 10)        Social History:                -  Home Living Status:  lives alone in an elevator accessible apartment building         -  Prior Home Care Services:   none          Baseline Functional Level Prior to Admission:             - ADL's/ IADL's:  independent         - ambulatory status PTA:  walked without assistive devices    FAMILY HISTORY:  FH: diabetes mellitus (Father, Mother)  mother and father        CBC Full  -  ( 06 Jul 2021 13:11 )  WBC Count : 7.57 K/uL  RBC Count : 4.18 M/uL  Hemoglobin : 13.0 g/dL  Hematocrit : 39.1 %  Platelet Count - Automated : 222 K/uL  Mean Cell Volume : 93.5 fl  Mean Cell Hemoglobin : 31.1 pg  Mean Cell Hemoglobin Concentration : 33.2 gm/dL  Auto Neutrophil # : 5.25 K/uL  Auto Lymphocyte # : 1.78 K/uL  Auto Monocyte # : 0.36 K/uL  Auto Eosinophil # : 0.12 K/uL  Auto Basophil # : 0.03 K/uL  Auto Neutrophil % : 69.3 %  Auto Lymphocyte % : 23.5 %  Auto Monocyte % : 4.8 %  Auto Eosinophil % : 1.6 %  Auto Basophil % : 0.4 %      07-06    141  |  103  |  11  ----------------------------<  102<H>  4.6   |  25  |  0.81    Ca    9.7      06 Jul 2021 13:11    TPro  7.6  /  Alb  4.6  /  TBili  0.4  /  DBili  x   /  AST  27  /  ALT  21  /  AlkPhos  87  07-06            Radiology:    < from: Xray Toes, Right Foot (07.06.21 @ 12:21) >  EXAM:  XR TOE(S)-RIGHT MIN 2 VIEWS                          PROCEDURE DATE:  07/06/2021          INTERPRETATION:  CLINICAL HISTORY: 71-year-old with history of osteomyelitis. Swelling.        IMPRESSION: Frontal, lateral and oblique views of the right great toe demonstrate interval increasing sclerosis and persistent lucency and fragmentation of the distal left of the great toe with overlying skin defect, consistent with chronic osteomyelitis.    MRI is more sensitive examination for detection of osteomyelitis.                  Vital Signs Last 24 Hrs  T(C): 36.4 (07 Jul 2021 05:44), Max: 37.1 (06 Jul 2021 17:31)  T(F): 97.5 (07 Jul 2021 05:44), Max: 98.7 (06 Jul 2021 17:31)  HR: 61 (07 Jul 2021 05:44) (59 - 72)  BP: 97/58 (07 Jul 2021 05:44) (92/46 - 126/87)  BP(mean): --  RR: 17 (07 Jul 2021 05:44) (16 - 17)  SpO2: 97% (07 Jul 2021 05:44) (95% - 99%)        REVIEW OF SYSTEMS:    CONSTITUTIONAL: No fever, weight loss, or fatigue  EYES: No eye pain, visual disturbances, or discharge  ENMT:  No difficulty hearing, tinnitus, vertigo; No sinus or throat pain  NECK: No pain or stiffness  BREASTS: No pain, masses, or nipple discharge  RESPIRATORY: No cough, wheezing, chills or hemoptysis; No shortness of breath  CARDIOVASCULAR: No chest pain, palpitations, dizziness, or leg swelling  GASTROINTESTINAL: No abdominal or epigastric pain. No nausea, vomiting, or hematemesis; No diarrhea or constipation. No melena or hematochezia.  GENITOURINARY: No dysuria, frequency, hematuria, or incontinence  NEUROLOGICAL: No headaches, memory loss, loss of strength, numbness, or tremors  SKIN: No itching, burning, rashes, or lesions   LYMPH NODES: No enlarged glands  ENDOCRINE: No heat or cold intolerance; No hair loss  MUSCULOSKELETAL: No joint pain or swelling; No muscle, back, or extremity pain  PSYCHIATRIC: No depression, anxiety, mood swings, or difficulty sleeping  HEME/LYMPH: No easy bruising, or bleeding gums  ALLERGY AND IMMUNOLOGIC: No hives or eczema  VASCULAR: R I toe pain  : no dysuria, hematuria, frequency        Physical Exam:  WDWN 72 yo  woman lying in semi Matias's position, awake/alert, c/o R I toe pain    Head: normocephalic, atraumatic    Eyes: PERRLA, EOMI, no nystagmus, sclera anicteric    ENT: nasal discharge, uvula midline, no oropharyngeal erythema/exudate    Neck: supple, negative JVD, negative carotid bruits, no thyromegaly    Chest: CTA bilaterally, neg wheeze/ rhonchi/ rales/ crackles/ egophany    Cardiovascular: regular rate and rhythm, neg murmurs/rubs/gallops    Abdomen: soft, non distended, non tender to palpation in all 4 quadrants, negative rebound/guarding, normal bowel sounds    Extremities:  R  hallux erythema/ mild swelling, TTP    Neurologic Exam:    Motor Exam:    Right UE:              5/5                              Left UE:                5/5                                                            Right LE:              5/5                              Left LE:                5/5                                           Sensation:           intact to light touch x 4 extremities                                                    DTR:                  biceps/brachioradialis: equal bilaterally                                                  patella/ankle: equal bilaterally                            Gait:  not tested        PM&R Impression:    1) r/o OM R I toe   2) no focal weakness    Plan:    1) Physical therapy focusing on therapeutic exercises, bed mobility/transfer out of bed evaluation, progressive ambulation with assistive devices prn.    2) Anticipated Disposition Plan/Recs:   d/c home with home physical therapy

## 2021-07-07 NOTE — PROGRESS NOTE ADULT - ASSESSMENT
IMPRESSION:  Diabetic foot infection; likely underlying osteomyelitis.  The pathology report comments that no bone was present.  She likely has an underlying acute on chronic OM.  Culture is still pending    Recommend:  1.  Continue Nafcillin 2 grams IV q4hrs for now  2.  Follow up wound culture    ID team 2 will follow.  Dr. Dominguez will take over the team on 7/8/21

## 2021-07-07 NOTE — DISCHARGE NOTE PROVIDER - NSDCMRMEDTOKEN_GEN_ALL_CORE_FT
atorvastatin: 40 milligram(s) orally once a day (at bedtime)  Basaglar KwikPen 100 units/mL subcutaneous solution: 30 unit(s) subcutaneous once a day (at bedtime)  Incruse Ellipta 62.5 mcg/inh inhalation powder: 1 puff(s) inhaled once a day  Multiple Vitamins oral tablet: 1 tab(s) orally once a day  nafcillin 2 g/100 mL intravenous solution: 100 milliliter(s) intravenous every 4 hours x 42d, end date June 2nd  Symbicort 160 mcg-4.5 mcg/inh inhalation aerosol: 2 puff(s) inhaled 2 times a day  valsartan 80 mg oral capsule: 1 cap(s) orally once a day   atorvastatin: 40 milligram(s) orally once a day (at bedtime)  Basaglar KwikPen 100 units/mL subcutaneous solution: 25 unit(s) subcutaneous once a day (at bedtime)  gabapentin 100 mg oral capsule: 1 cap(s) orally 3 times a day  Incruse Ellipta 62.5 mcg/inh inhalation powder: 1 puff(s) inhaled once a day  Multiple Vitamins oral tablet: 1 tab(s) orally once a day  nafcillin 2 g/100 mL intravenous solution: 100 milliliter(s) intravenous every 4 hours x 42d, end date June 2nd  Symbicort 160 mcg-4.5 mcg/inh inhalation aerosol: 2 puff(s) inhaled 2 times a day  valsartan 80 mg oral capsule: 1 cap(s) orally once a day   atorvastatin: 40 milligram(s) orally once a day (at bedtime)  Basaglar KwikPen 100 units/mL subcutaneous solution: 25 unit(s) subcutaneous once a day (at bedtime)  gabapentin 100 mg oral capsule: 1 cap(s) orally 3 times a day  Incruse Ellipta 62.5 mcg/inh inhalation powder: 1 puff(s) inhaled once a day  MRI of R foot w/ w/o IV Contrast : 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, unspecified ankle and foot 1  Multiple Vitamins oral tablet: 1 tab(s) orally once a day  nafcillin 2 g/100 mL intravenous solution: 100 milliliter(s) intravenous every 4 hours x 42d, end date June 2nd  Symbicort 160 mcg-4.5 mcg/inh inhalation aerosol: 2 puff(s) inhaled 2 times a day  valsartan 80 mg oral capsule: 1 cap(s) orally once a day   atorvastatin: 40 milligram(s) orally once a day (at bedtime)  Basaglar KwikPen 100 units/mL subcutaneous solution: 25 unit(s) subcutaneous once a day (at bedtime)  gabapentin 100 mg oral capsule: 1 cap(s) orally 3 times a day  Incruse Ellipta 62.5 mcg/inh inhalation powder: 1 puff(s) inhaled once a day  Keflex 500 mg oral capsule: 1 cap(s) orally every 6 hours every day for 36 days - last dose 8/16  Multiple Vitamins oral tablet: 1 tab(s) orally once a day  Symbicort 160 mcg-4.5 mcg/inh inhalation aerosol: 2 puff(s) inhaled 2 times a day  valsartan 80 mg oral capsule: 1 cap(s) orally once a day

## 2021-07-07 NOTE — PROGRESS NOTE ADULT - SUBJECTIVE AND OBJECTIVE BOX
Subjective:   A 71 yr old female w/ PMH of HTN, HLD, Asthma and DM controlled with basal insulin and prior osteomyelitis diagnosed on last admission in April and treated with IV nafcillin for 6 weeks presents with pain and swelling of her right big toe. Pt pain began in Feburary and her right big toe nail was removed. In April pt was diagnosed with osteomyelitis after a biopsy and cultures grew S. aureus and S. lugdunensis and was treated with the IV nafcillin 4g IV q4.  Pt visited the podiatrist on 6/24 and the examination was normal. Later she began to feel pain and on 6/25 I&D of the right toe expressed pus and an X ray was done. Pt was prescribed with ce      Objective:    **INCOMPLETE NOTE    OVERNIGHT EVENTS: Nothing overnight    SUBJECTIVE:  Patient seen and examined at bedside. Pt stated that the pain in her toes had improved from a 6/10 to a 5/10 and feels better.    Vital Signs Last 12 Hrs  T(F): 98.6 (07-07-21 @ 09:06), Max: 98.6 (07-07-21 @ 09:06)  HR: 67 (07-07-21 @ 10:50) (61 - 67)  BP: 122/66 (07-07-21 @ 10:50) (97/58 - 122/66)  BP(mean): --  RR: 18 (07-07-21 @ 09:06) (17 - 18)  SpO2: 97% (07-07-21 @ 09:06) (97% - 97%)  I&O's Summary      PHYSICAL EXAM:  Constitutional: NAD, comfortable in bed.  HEENT: NC/AT, PERRLA, EOMI, no conjunctival pallor or scleral icterus, MMM  Neck: Supple, no JVD  Respiratory: CTA B/L. No w/r/r.   Cardiovascular: RRR, normal S1 and S2, no m/r/g.   Gastrointestinal: +BS, soft NTND, no guarding or rebound tenderness, no palpable masses   Extremities: wwp; no cyanosis, clubbing or edema.   Vascular: Pulses equal and strong throughout.   Neurological: AAOx3, no CN deficits, strength and sensation intact throughout.   Skin: No gross skin abnormalities or rashes        LABS:                        11.8   4.42  )-----------( 171      ( 07 Jul 2021 09:29 )             36.2     07-07    143  |  107  |  13  ----------------------------<  84  4.4   |  26  |  0.88    Ca    9.1      07 Jul 2021 09:29  Mg     2.1     07-07    TPro  7.6  /  Alb  4.6  /  TBili  0.4  /  DBili  x   /  AST  27  /  ALT  21  /  AlkPhos  87  07-06        RADIOLOGY & ADDITIONAL TESTS:    MEDICATIONS  (STANDING):  atorvastatin 40 milliGRAM(s) Oral at bedtime  dextrose 40% Gel 15 Gram(s) Oral once  dextrose 5%. 1000 milliLiter(s) (50 mL/Hr) IV Continuous <Continuous>  dextrose 5%. 1000 milliLiter(s) (100 mL/Hr) IV Continuous <Continuous>  dextrose 50% Injectable 25 Gram(s) IV Push once  dextrose 50% Injectable 12.5 Gram(s) IV Push once  dextrose 50% Injectable 25 Gram(s) IV Push once  enoxaparin Injectable 40 milliGRAM(s) SubCutaneous every 24 hours  glucagon  Injectable 1 milliGRAM(s) IntraMuscular once  insulin glargine Injectable (LANTUS) 23 Unit(s) SubCutaneous at bedtime  insulin lispro (ADMELOG) corrective regimen sliding scale   SubCutaneous Before meals and at bedtime  nafcillin  IVPB 2 Gram(s) IV Intermittent every 4 hours    MEDICATIONS  (PRN):  acetaminophen   Tablet .. 650 milliGRAM(s) Oral every 6 hours PRN Mild Pain (1 - 3), Moderate Pain (4 - 6)     **INCOMPLETE NOTE    OVERNIGHT EVENTS: Nothing overnight    SUBJECTIVE:  Patient seen and examined at bedside. Pt stated that the pain in her toes had improved from a 6/10 to a 5/10 and feels better.    Vital Signs Last 12 Hrs  T(F): 98.6 (07-07-21 @ 09:06), Max: 98.6 (07-07-21 @ 09:06)  HR: 67 (07-07-21 @ 10:50) (61 - 67)  BP: 122/66 (07-07-21 @ 10:50) (97/58 - 122/66)  BP(mean): --  RR: 18 (07-07-21 @ 09:06) (17 - 18)  SpO2: 97% (07-07-21 @ 09:06) (97% - 97%)  I&O's Summary      PHYSICAL EXAM:  Constitutional: NAD, comfortable in bed.  HEENT: NC/AT, PERRLA, EOMI, no conjunctival pallor or scleral icterus, MMM  Neck: Supple, no JVD  Respiratory: CTA B/L. No w/r/r.   Cardiovascular: RRR, normal S1 and S2, no m/r/g.   Gastrointestinal: +BS, soft NTND, no guarding or rebound tenderness, no palpable masses   Extremities: wwp; no cyanosis, clubbing or edema. R toe mildly erythematous with an absent toe nail and an incision. No purulent fluid.   Vascular: Pulses equal and strong throughout.   Neurological: AAOx3, no CN deficits, strength and sensation intact throughout.   Skin: No gross skin abnormalities or rashes        LABS:                        11.8   4.42  )-----------( 171      ( 07 Jul 2021 09:29 )             36.2     07-07    143  |  107  |  13  ----------------------------<  84  4.4   |  26  |  0.88    Ca    9.1      07 Jul 2021 09:29  Mg     2.1     07-07    TPro  7.6  /  Alb  4.6  /  TBili  0.4  /  DBili  x   /  AST  27  /  ALT  21  /  AlkPhos  87  07-06      RADIOLOGY  Xray Toes, Right Foot (07.06.21 @ 12:21)    IMPRESSION: Frontal, lateral and oblique views of the right great toe demonstrate interval increasing sclerosis and persistent lucency and fragmentation of the distal left of the great toe with overlying skin defect, consistent with chronic osteomyelitis.    MRI is more sensitive examination for detection of osteomyelitis.    MICROBIOLOGY    Culture - Tissue with Gram Stain (collected 06 Jul 2021 14:33)  Source: .Tissue Right hallux bone  Gram Stain (06 Jul 2021 16:27):    No organisms seen    No WBC's seen.  Preliminary Report (07 Jul 2021 08:12):    No growth to date    MEDICATIONS  (STANDING):  atorvastatin 40 milliGRAM(s) Oral at bedtime  dextrose 40% Gel 15 Gram(s) Oral once  dextrose 5%. 1000 milliLiter(s) (50 mL/Hr) IV Continuous <Continuous>  dextrose 5%. 1000 milliLiter(s) (100 mL/Hr) IV Continuous <Continuous>  dextrose 50% Injectable 25 Gram(s) IV Push once  dextrose 50% Injectable 12.5 Gram(s) IV Push once  dextrose 50% Injectable 25 Gram(s) IV Push once  enoxaparin Injectable 40 milliGRAM(s) SubCutaneous every 24 hours  glucagon  Injectable 1 milliGRAM(s) IntraMuscular once  insulin glargine Injectable (LANTUS) 23 Unit(s) SubCutaneous at bedtime  insulin lispro (ADMELOG) corrective regimen sliding scale   SubCutaneous Before meals and at bedtime  nafcillin  IVPB 2 Gram(s) IV Intermittent every 4 hours    MEDICATIONS  (PRN):  acetaminophen   Tablet .. 650 milliGRAM(s) Oral every 6 hours PRN Mild Pain (1 - 3), Moderate Pain (4 - 6)     **INCOMPLETE NOTE    OVERNIGHT EVENTS: Nothing overnight    SUBJECTIVE:  Patient seen and examined at bedside. Pt stated that the pain in her toes had improved from a 6/10 to a 5/10 and feels better. No fever, chills, or SOB.     Vital Signs Last 12 Hrs  T(F): 98.6 (07-07-21 @ 09:06), Max: 98.6 (07-07-21 @ 09:06)  HR: 67 (07-07-21 @ 10:50) (61 - 67)  BP: 122/66 (07-07-21 @ 10:50) (97/58 - 122/66)  BP(mean): --  RR: 18 (07-07-21 @ 09:06) (17 - 18)  SpO2: 97% (07-07-21 @ 09:06) (97% - 97%)  I&O's Summary      PHYSICAL EXAM:  Constitutional: NAD, comfortable in bed.  HEENT: NC/AT, PERRLA, EOMI, no conjunctival pallor or scleral icterus, MMM  Neck: Supple, no JVD  Respiratory: CTA B/L. No w/r/r.   Cardiovascular: RRR, normal S1 and S2, no m/r/g.   Gastrointestinal: +BS, soft NTND, no guarding or rebound tenderness, no palpable masses   Extremities: wwp; no cyanosis, clubbing or edema. R toe mildly erythematous with an absent toe nail and an incision. No purulent fluid.   Vascular: Pulses equal and strong throughout.   Neurological: AAOx3, no CN deficits, strength and sensation intact throughout.   Skin: No gross skin abnormalities or rashes        LABS:                        11.8   4.42  )-----------( 171      ( 07 Jul 2021 09:29 )             36.2     07-07    143  |  107  |  13  ----------------------------<  84  4.4   |  26  |  0.88    Ca    9.1      07 Jul 2021 09:29  Mg     2.1     07-07    TPro  7.6  /  Alb  4.6  /  TBili  0.4  /  DBili  x   /  AST  27  /  ALT  21  /  AlkPhos  87  07-06      RADIOLOGY  Xray Toes, Right Foot (07.06.21 @ 12:21)    IMPRESSION: Frontal, lateral and oblique views of the right great toe demonstrate interval increasing sclerosis and persistent lucency and fragmentation of the distal left of the great toe with overlying skin defect, consistent with chronic osteomyelitis.    MRI is more sensitive examination for detection of osteomyelitis.    MICROBIOLOGY    Culture - Tissue with Gram Stain (collected 06 Jul 2021 14:33)  Source: .Tissue Right hallux bone  Gram Stain (06 Jul 2021 16:27):    No organisms seen    No WBC's seen.  Preliminary Report (07 Jul 2021 08:12):    No growth to date    MEDICATIONS  (STANDING):  atorvastatin 40 milliGRAM(s) Oral at bedtime  dextrose 40% Gel 15 Gram(s) Oral once  dextrose 5%. 1000 milliLiter(s) (50 mL/Hr) IV Continuous <Continuous>  dextrose 5%. 1000 milliLiter(s) (100 mL/Hr) IV Continuous <Continuous>  dextrose 50% Injectable 25 Gram(s) IV Push once  dextrose 50% Injectable 12.5 Gram(s) IV Push once  dextrose 50% Injectable 25 Gram(s) IV Push once  enoxaparin Injectable 40 milliGRAM(s) SubCutaneous every 24 hours  glucagon  Injectable 1 milliGRAM(s) IntraMuscular once  insulin glargine Injectable (LANTUS) 23 Unit(s) SubCutaneous at bedtime  insulin lispro (ADMELOG) corrective regimen sliding scale   SubCutaneous Before meals and at bedtime  nafcillin  IVPB 2 Gram(s) IV Intermittent every 4 hours    MEDICATIONS  (PRN):  acetaminophen   Tablet .. 650 milliGRAM(s) Oral every 6 hours PRN Mild Pain (1 - 3), Moderate Pain (4 - 6)     **INCOMPLETE NOTE   used: [id number]    OVERNIGHT EVENTS: Nothing overnight    SUBJECTIVE:  Patient seen and examined at bedside. Pt stated that the pain in her toes had improved from a 6/10 to a 5/10 and feels better. No fever, chills, or SOB.     Vital Signs Last 12 Hrs  T(F): 98.6 (07-07-21 @ 09:06), Max: 98.6 (07-07-21 @ 09:06)  HR: 67 (07-07-21 @ 10:50) (61 - 67)  BP: 122/66 (07-07-21 @ 10:50) (97/58 - 122/66)  BP(mean): --  RR: 18 (07-07-21 @ 09:06) (17 - 18)  SpO2: 97% (07-07-21 @ 09:06) (97% - 97%)  I&O's Summary      PHYSICAL EXAM:  Constitutional: NAD, comfortable in bed.  HEENT: NC/AT, PERRLA, EOMI, no conjunctival pallor or scleral icterus, MMM  Neck: Supple, no JVD  Respiratory: CTA B/L. No w/r/r.   Cardiovascular: RRR, normal S1 and S2, no m/r/g.   Gastrointestinal: +BS, soft NTND, no guarding or rebound tenderness, no palpable masses   Extremities: wwp; no cyanosis, clubbing or edema. R toe mildly erythematous with an absent toe nail and an incision. No purulent fluid.   Vascular: Pulses equal and strong throughout.   Neurological: AAOx3, no CN deficits, strength and sensation intact throughout.   Skin: No gross skin abnormalities or rashes        LABS:                        11.8   4.42  )-----------( 171      ( 07 Jul 2021 09:29 )             36.2     07-07    143  |  107  |  13  ----------------------------<  84  4.4   |  26  |  0.88    Ca    9.1      07 Jul 2021 09:29  Mg     2.1     07-07    TPro  7.6  /  Alb  4.6  /  TBili  0.4  /  DBili  x   /  AST  27  /  ALT  21  /  AlkPhos  87  07-06      RADIOLOGY  Xray Toes, Right Foot (07.06.21 @ 12:21)    IMPRESSION: Frontal, lateral and oblique views of the right great toe demonstrate interval increasing sclerosis and persistent lucency and fragmentation of the distal left of the great toe with overlying skin defect, consistent with chronic osteomyelitis.    MRI is more sensitive examination for detection of osteomyelitis.    MICROBIOLOGY    Culture - Tissue with Gram Stain (collected 06 Jul 2021 14:33)  Source: .Tissue Right hallux bone  Gram Stain (06 Jul 2021 16:27):    No organisms seen    No WBC's seen.  Preliminary Report (07 Jul 2021 08:12):    No growth to date    MEDICATIONS  (STANDING):  atorvastatin 40 milliGRAM(s) Oral at bedtime  dextrose 40% Gel 15 Gram(s) Oral once  dextrose 5%. 1000 milliLiter(s) (50 mL/Hr) IV Continuous <Continuous>  dextrose 5%. 1000 milliLiter(s) (100 mL/Hr) IV Continuous <Continuous>  dextrose 50% Injectable 25 Gram(s) IV Push once  dextrose 50% Injectable 12.5 Gram(s) IV Push once  dextrose 50% Injectable 25 Gram(s) IV Push once  enoxaparin Injectable 40 milliGRAM(s) SubCutaneous every 24 hours  glucagon  Injectable 1 milliGRAM(s) IntraMuscular once  insulin glargine Injectable (LANTUS) 23 Unit(s) SubCutaneous at bedtime  insulin lispro (ADMELOG) corrective regimen sliding scale   SubCutaneous Before meals and at bedtime  nafcillin  IVPB 2 Gram(s) IV Intermittent every 4 hours    MEDICATIONS  (PRN):  acetaminophen   Tablet .. 650 milliGRAM(s) Oral every 6 hours PRN Mild Pain (1 - 3), Moderate Pain (4 - 6)      used: [id number]    OVERNIGHT EVENTS: Nothing overnight    SUBJECTIVE:  Patient seen and examined at bedside. Pt stated that the pain in her toes had improved from a 6/10 to a 5/10 and feels better. No fever, chills, or SOB.     Vital Signs Last 12 Hrs  T(F): 98.6 (07-07-21 @ 09:06), Max: 98.6 (07-07-21 @ 09:06)  HR: 67 (07-07-21 @ 10:50) (61 - 67)  BP: 122/66 (07-07-21 @ 10:50) (97/58 - 122/66)  BP(mean): --  RR: 18 (07-07-21 @ 09:06) (17 - 18)  SpO2: 97% (07-07-21 @ 09:06) (97% - 97%)  I&O's Summary      PHYSICAL EXAM:  Constitutional: NAD, comfortable in bed.  HEENT: NC/AT, PERRLA, EOMI, no conjunctival pallor or scleral icterus, MMM  Neck: Supple, no JVD  Respiratory: CTA B/L. No w/r/r.   Cardiovascular: RRR, normal S1 and S2, no m/r/g.   Gastrointestinal: +BS, soft NTND, no guarding or rebound tenderness, no palpable masses   Extremities: wwp; no cyanosis, clubbing or edema. R toe mildly erythematous with an absent toe nail and an incision. No purulent fluid.   Vascular: Pulses equal and strong throughout.   Neurological: AAOx3, no CN deficits, strength and sensation intact throughout.   Skin: No gross skin abnormalities or rashes        LABS:                        11.8   4.42  )-----------( 171      ( 07 Jul 2021 09:29 )             36.2     07-07    143  |  107  |  13  ----------------------------<  84  4.4   |  26  |  0.88    Ca    9.1      07 Jul 2021 09:29  Mg     2.1     07-07    TPro  7.6  /  Alb  4.6  /  TBili  0.4  /  DBili  x   /  AST  27  /  ALT  21  /  AlkPhos  87  07-06      RADIOLOGY  Xray Toes, Right Foot (07.06.21 @ 12:21)    IMPRESSION: Frontal, lateral and oblique views of the right great toe demonstrate interval increasing sclerosis and persistent lucency and fragmentation of the distal left of the great toe with overlying skin defect, consistent with chronic osteomyelitis.    MRI is more sensitive examination for detection of osteomyelitis.    MICROBIOLOGY    Culture - Tissue with Gram Stain (collected 06 Jul 2021 14:33)  Source: .Tissue Right hallux bone  Gram Stain (06 Jul 2021 16:27):    No organisms seen    No WBC's seen.  Preliminary Report (07 Jul 2021 08:12):    No growth to date    MEDICATIONS  (STANDING):  atorvastatin 40 milliGRAM(s) Oral at bedtime  dextrose 40% Gel 15 Gram(s) Oral once  dextrose 5%. 1000 milliLiter(s) (50 mL/Hr) IV Continuous <Continuous>  dextrose 5%. 1000 milliLiter(s) (100 mL/Hr) IV Continuous <Continuous>  dextrose 50% Injectable 25 Gram(s) IV Push once  dextrose 50% Injectable 12.5 Gram(s) IV Push once  dextrose 50% Injectable 25 Gram(s) IV Push once  enoxaparin Injectable 40 milliGRAM(s) SubCutaneous every 24 hours  glucagon  Injectable 1 milliGRAM(s) IntraMuscular once  insulin glargine Injectable (LANTUS) 23 Unit(s) SubCutaneous at bedtime  insulin lispro (ADMELOG) corrective regimen sliding scale   SubCutaneous Before meals and at bedtime  nafcillin  IVPB 2 Gram(s) IV Intermittent every 4 hours    MEDICATIONS  (PRN):  acetaminophen   Tablet .. 650 milliGRAM(s) Oral every 6 hours PRN Mild Pain (1 - 3), Moderate Pain (4 - 6)

## 2021-07-08 DIAGNOSIS — M86.9 OSTEOMYELITIS, UNSPECIFIED: ICD-10-CM

## 2021-07-08 LAB
GLUCOSE BLDC GLUCOMTR-MCNC: 107 MG/DL — HIGH (ref 70–99)
GLUCOSE BLDC GLUCOMTR-MCNC: 174 MG/DL — HIGH (ref 70–99)
GLUCOSE BLDC GLUCOMTR-MCNC: 201 MG/DL — HIGH (ref 70–99)
GLUCOSE BLDC GLUCOMTR-MCNC: 69 MG/DL — LOW (ref 70–99)
GLUCOSE BLDC GLUCOMTR-MCNC: 85 MG/DL — SIGNIFICANT CHANGE UP (ref 70–99)

## 2021-07-08 PROCEDURE — 99233 SBSQ HOSP IP/OBS HIGH 50: CPT | Mod: GC

## 2021-07-08 PROCEDURE — 99232 SBSQ HOSP IP/OBS MODERATE 35: CPT

## 2021-07-08 RX ORDER — INSULIN GLARGINE 100 [IU]/ML
25 INJECTION, SOLUTION SUBCUTANEOUS
Qty: 0 | Refills: 0 | DISCHARGE

## 2021-07-08 RX ORDER — VALSARTAN 80 MG/1
1 TABLET ORAL
Qty: 0 | Refills: 0 | DISCHARGE

## 2021-07-08 RX ORDER — ATORVASTATIN CALCIUM 80 MG/1
40 TABLET, FILM COATED ORAL
Qty: 0 | Refills: 0 | DISCHARGE

## 2021-07-08 RX ORDER — INSULIN GLARGINE 100 [IU]/ML
18 INJECTION, SOLUTION SUBCUTANEOUS AT BEDTIME
Refills: 0 | Status: DISCONTINUED | OUTPATIENT
Start: 2021-07-08 | End: 2021-07-11

## 2021-07-08 RX ORDER — ACETAMINOPHEN 500 MG
650 TABLET ORAL EVERY 6 HOURS
Refills: 0 | Status: DISCONTINUED | OUTPATIENT
Start: 2021-07-08 | End: 2021-07-11

## 2021-07-08 RX ORDER — INSULIN GLARGINE 100 [IU]/ML
30 INJECTION, SOLUTION SUBCUTANEOUS
Qty: 0 | Refills: 0 | DISCHARGE

## 2021-07-08 RX ORDER — GABAPENTIN 400 MG/1
1 CAPSULE ORAL
Qty: 0 | Refills: 0 | DISCHARGE

## 2021-07-08 RX ORDER — UMECLIDINIUM 62.5 UG/1
1 AEROSOL, POWDER ORAL
Qty: 0 | Refills: 0 | DISCHARGE

## 2021-07-08 RX ORDER — BUDESONIDE AND FORMOTEROL FUMARATE DIHYDRATE 160; 4.5 UG/1; UG/1
2 AEROSOL RESPIRATORY (INHALATION)
Qty: 0 | Refills: 0 | DISCHARGE

## 2021-07-08 RX ORDER — ACETAMINOPHEN 500 MG
650 TABLET ORAL EVERY 6 HOURS
Refills: 0 | Status: DISCONTINUED | OUTPATIENT
Start: 2021-07-08 | End: 2021-07-08

## 2021-07-08 RX ADMIN — NAFCILLIN 200 GRAM(S): 10 INJECTION, POWDER, FOR SOLUTION INTRAVENOUS at 18:00

## 2021-07-08 RX ADMIN — NAFCILLIN 200 GRAM(S): 10 INJECTION, POWDER, FOR SOLUTION INTRAVENOUS at 01:27

## 2021-07-08 RX ADMIN — Medication 2: at 22:07

## 2021-07-08 RX ADMIN — Medication 4: at 17:07

## 2021-07-08 RX ADMIN — NAFCILLIN 200 GRAM(S): 10 INJECTION, POWDER, FOR SOLUTION INTRAVENOUS at 10:00

## 2021-07-08 RX ADMIN — NAFCILLIN 200 GRAM(S): 10 INJECTION, POWDER, FOR SOLUTION INTRAVENOUS at 05:55

## 2021-07-08 RX ADMIN — NAFCILLIN 200 GRAM(S): 10 INJECTION, POWDER, FOR SOLUTION INTRAVENOUS at 14:00

## 2021-07-08 RX ADMIN — ATORVASTATIN CALCIUM 40 MILLIGRAM(S): 80 TABLET, FILM COATED ORAL at 22:01

## 2021-07-08 RX ADMIN — INSULIN GLARGINE 18 UNIT(S): 100 INJECTION, SOLUTION SUBCUTANEOUS at 22:01

## 2021-07-08 RX ADMIN — ENOXAPARIN SODIUM 40 MILLIGRAM(S): 100 INJECTION SUBCUTANEOUS at 14:00

## 2021-07-08 RX ADMIN — NAFCILLIN 200 GRAM(S): 10 INJECTION, POWDER, FOR SOLUTION INTRAVENOUS at 22:00

## 2021-07-08 NOTE — PROGRESS NOTE ADULT - PROBLEM SELECTOR PLAN 3
On Basaglar 30 at home  A1C 3 months ago was 6.7; seems well controlled  -Inpatient ISS   -lantus changed from 23U to 18U due to POCT Blood Glucose of 69 in the morning

## 2021-07-08 NOTE — PROGRESS NOTE ADULT - ASSESSMENT
A 71F with PMH of HTN, HLD, asthma, DM, and previous diagnosis of R great toe osteomyelitis status post antibiotics presents with pain of the R toe being treated for possible acute on chronic osteomyelitis.

## 2021-07-08 NOTE — PROGRESS NOTE ADULT - SUBJECTIVE AND OBJECTIVE BOX
INTERVAL HPI/OVERNIGHT EVENTS:  Remains afebrile.  Per Ms. Daley, toe pain has steadily improved since admission.    CONSTITUTIONAL:  No fever, chills, night sweats  EYES:  No photophobia or visual changes  CARDIOVASCULAR:  No chest pain  RESPIRATORY:  No cough, wheezing, or SOB   GASTROINTESTINAL:  No nausea, vomiting, diarrhea, constipation, or abdominal pain  GENITOURINARY:  No frequency, urgency, dysuria or hematuria  NEUROLOGIC:  No headache, lightheadedness      ANTIBIOTICS/RELEVANT:    Nafcillin 2 g IV q4h (7/5-present)      Vital Signs Last 24 Hrs  T(C): 36.6 (08 Jul 2021 16:42), Max: 36.8 (08 Jul 2021 06:16)  T(F): 97.8 (08 Jul 2021 16:42), Max: 98.2 (08 Jul 2021 06:16)  HR: 70 (08 Jul 2021 16:42) (59 - 70)  BP: 131/74 (08 Jul 2021 16:42) (100/62 - 131/74)  BP(mean): --  RR: 16 (08 Jul 2021 16:42) (14 - 18)  SpO2: 95% (08 Jul 2021 16:42) (95% - 96%)    PHYSICAL EXAM:  Constitutional:  Well-developed, well nourished  Eyes:  Sclerae anicteric, conjunctivae clear, PERRL  Ear/Nose/Throat:  No nasal exudate or sinus tenderness;  No buccal mucosal lesions, no pharyngeal erythema or exudate	  Neck:  Supple, no adenopathy  Respiratory:  Clear bilaterally  Cardiovascular:  RRR, S1S2, no murmur appreciated  Gastrointestinal:  Symmetric, normoactive BS, soft, NT, no masses, guarding or rebound.  No HSM  Extremities:  No edema.  RLE wrapped by Podiatry      LABS:                        11.8   4.42  )-----------( 171      ( 07 Jul 2021 09:29 )             36.2         07-07    143  |  107  |  13  ----------------------------<  84  4.4   |  26  |  0.88    Ca    9.1      07 Jul 2021 09:29  Mg     2.1     07-07            MICROBIOLOGY:    Blood cultures:  7/6 X 2 - NGTD    Tissue R hallux bone 7/6 - NGTD    RADIOLOGY & ADDITIONAL STUDIES:  Surgical Pathology Report:   ACCESSION No:  [id number]    CHERELLE DALEY 2        Surgical Final Report          Final Diagnosis  Hallux, distal phalanx bone, right; biopsy:  - Benign skin adnexal glands, fibrous and adipose tissue with  focal mild chronic  inflammation.    Note: Bone not present.    Verified by: Preethi Up MD  (Electronic Signature)  Reported on: 07/07/21 14:28 EDT, [facility], [address] [place] [id number]  Phone: [phone]   Fax: [phone]  _________________________________________________________________    Clinical History  Right hallux osteomyelitis    Specimen(s) Submitted    1     Right hallux distal phalanx bone    Gross Description  Received fresh labeled "Path right hallux bone" is a 0.5 x 0.1 x  <0.1 cm in greatest dimension tan-pink, soft to slightly firm  tissue fragment. Entirely in one cassette: 1A.    Margareth Reed 07/06/2021 05:00 PM    Disclaimer  In addition to other data that may appear on the specimen  containers, all labelshave been inspected to confirm the  presence of the patient's name and date of birth.    Histological Processing Performed at [facility],  Department of Pathology, [address] [place] [address].      Surgical Consult Report          Disclaimer  In addition to other data that may appear on the specimen  containers, all labels have been inspected to confirm the  presence of the patient's name and date of birth.              CHERELLE DALEY                          2        Surgical Consult Report          Histological Processing Performed at [facility],  Department of Pathology, [address] [place] [address]. (07.06.21 @ 14:04)

## 2021-07-08 NOTE — PROGRESS NOTE ADULT - SUBJECTIVE AND OBJECTIVE BOX
**INCOMPLETE NOTE    OVERNIGHT EVENTS: No acute events overnight. Pt slept well.     SUBJECTIVE:  Patient seen and examined at bedside. Pt stated pain has decreased from a 5/10 to 4/10 today. Pt states that she is feeling much better since her admission. No fevers, chills, or SOB. No new complaints.     Vital Signs Last 12 Hrs  T(F): 97.9 (07-08-21 @ 09:48), Max: 98.2 (07-08-21 @ 06:16)  HR: 66 (07-08-21 @ 09:48) (61 - 66)  BP: 111/68 (07-08-21 @ 09:48) (111/68 - 114/67)  BP(mean): --  RR: 14 (07-08-21 @ 09:48) (14 - 17)  SpO2: 96% (07-08-21 @ 09:48) (95% - 96%)        PHYSICAL EXAM:  Constitutional: NAD, comfortable in bed.  HEENT: NC/AT, PERRLA, EOMI, no conjunctival pallor or scleral icterus, MMM  Neck: Supple, no JVD  Respiratory: CTA B/L. No w/r/r.   Cardiovascular: RRR, normal S1 and S2, no m/r/g.   Gastrointestinal: +BS, soft NTND, no guarding or rebound tenderness, no palpable masses   Extremities: wwp; no cyanosis, clubbing or edema. R toe mildly erythematous with an absent toe nail and an incision. No purulent fluid.   Vascular: Pulses equal and strong throughout.   Neurological: AAOx3, no CN deficits, strength and sensation intact throughout.   Skin: No gross skin abnormalities or rashes        LABS:                        11.8   4.42  )-----------( 171      ( 07 Jul 2021 09:29 )             36.2     07-07    143  |  107  |  13  ----------------------------<  84  4.4   |  26  |  0.88    Ca    9.1      07 Jul 2021 09:29  Mg     2.1     07-07    TPro  7.6  /  Alb  4.6  /  TBili  0.4  /  DBili  x   /  AST  27  /  ALT  21  /  AlkPhos  87  07-06            RADIOLOGY  Xray Toes, Right Foot (07.06.21 @ 12:21)    IMPRESSION: Frontal, lateral and oblique views of the right great toe demonstrate interval increasing sclerosis and persistent lucency and fragmentation of the distal left of the great toe with overlying skin defect, consistent with chronic osteomyelitis.    MRI is more sensitive examination for detection of osteomyelitis.    MICROBIOLOGY    Culture - Tissue with Gram Stain (collected 06 Jul 2021 14:33)  Source: .Tissue Right hallux bone  Gram Stain (06 Jul 2021 16:27):    No organisms seen    No WBC's seen.  Preliminary Report (07 Jul 2021 08:12):    No growth to date    Note: Pathology report states that there was no bone on the tissue sample    Culture Results:   No growth to date    Culture - Blood (07.06.21 @ 13:31)    Specimen Source: .Blood Blood-Peripheral    Culture Results:   No growth at 1 day.      MEDICATIONS  (STANDING):  atorvastatin 40 milliGRAM(s) Oral at bedtime  dextrose 40% Gel 15 Gram(s) Oral once  dextrose 5%. 1000 milliLiter(s) (50 mL/Hr) IV Continuous <Continuous>  dextrose 5%. 1000 milliLiter(s) (100 mL/Hr) IV Continuous <Continuous>  dextrose 50% Injectable 25 Gram(s) IV Push once  dextrose 50% Injectable 12.5 Gram(s) IV Push once  dextrose 50% Injectable 25 Gram(s) IV Push once  enoxaparin Injectable 40 milliGRAM(s) SubCutaneous every 24 hours  glucagon  Injectable 1 milliGRAM(s) IntraMuscular once  insulin glargine Injectable (LANTUS) 23 Unit(s) SubCutaneous at bedtime  insulin lispro (ADMELOG) corrective regimen sliding scale   SubCutaneous Before meals and at bedtime  nafcillin  IVPB 2 Gram(s) IV Intermittent every 4 hours    MEDICATIONS  (PRN):  acetaminophen   Tablet .. 650 milliGRAM(s) Oral every 6 hours PRN Mild Pain (1 - 3), Moderate Pain (4 - 6)   **INCOMPLETE NOTE    OVERNIGHT EVENTS: No acute events overnight. Pt slept well.     SUBJECTIVE:  Patient seen and examined at bedside. Pt stated pain has decreased from a 5/10 to 4/10 today. Pt states that she is feeling much better since her admission. No fevers, chills, or SOB. No new complaints.     Vital Signs Last 12 Hrs  T(F): 97.9 (07-08-21 @ 09:48), Max: 98.2 (07-08-21 @ 06:16)  HR: 66 (07-08-21 @ 09:48) (61 - 66)  BP: 111/68 (07-08-21 @ 09:48) (111/68 - 114/67)  BP(mean): --  RR: 14 (07-08-21 @ 09:48) (14 - 17)  SpO2: 96% (07-08-21 @ 09:48) (95% - 96%)        PHYSICAL EXAM:  Constitutional: NAD, comfortable in bed.  HEENT: NC/AT, PERRLA, EOMI, no conjunctival pallor or scleral icterus, MMM  Neck: Supple, no JVD  Respiratory: CTA B/L. No w/r/r.   Cardiovascular: RRR, normal S1 and S2, no m/r/g.   Gastrointestinal: +BS, soft NTND, no guarding or rebound tenderness, no palpable masses   Extremities: wwp; no cyanosis, clubbing or edema. R toe mildly erythematous with an absent toe nail and an incision. No purulent fluid. Pt has tenderness only in the first metatarsal. There is no tenderness proximal to the first metatarsal.   Vascular: Pulses equal and strong throughout.   Neurological: AAOx3, no CN deficits, strength and sensation intact throughout.   Skin: No gross skin abnormalities or rashes        LABS:                        11.8   4.42  )-----------( 171      ( 07 Jul 2021 09:29 )             36.2     07-07    143  |  107  |  13  ----------------------------<  84  4.4   |  26  |  0.88    Ca    9.1      07 Jul 2021 09:29  Mg     2.1     07-07    TPro  7.6  /  Alb  4.6  /  TBili  0.4  /  DBili  x   /  AST  27  /  ALT  21  /  AlkPhos  87  07-06            RADIOLOGY  Xray Toes, Right Foot (07.06.21 @ 12:21)    IMPRESSION: Frontal, lateral and oblique views of the right great toe demonstrate interval increasing sclerosis and persistent lucency and fragmentation of the distal left of the great toe with overlying skin defect, consistent with chronic osteomyelitis.    MRI is more sensitive examination for detection of osteomyelitis.    MICROBIOLOGY    Culture - Tissue with Gram Stain (collected 06 Jul 2021 14:33)  Source: .Tissue Right hallux bone  Gram Stain (06 Jul 2021 16:27):    No organisms seen    No WBC's seen.  Preliminary Report (07 Jul 2021 08:12):    No growth to date    Note: Pathology report states that there was no bone on the tissue sample    Culture Results:   No growth to date    Culture - Blood (07.06.21 @ 13:31)    Specimen Source: .Blood Blood-Peripheral    Culture Results:   No growth at 1 day.      MEDICATIONS  (STANDING):  atorvastatin 40 milliGRAM(s) Oral at bedtime  dextrose 40% Gel 15 Gram(s) Oral once  dextrose 5%. 1000 milliLiter(s) (50 mL/Hr) IV Continuous <Continuous>  dextrose 5%. 1000 milliLiter(s) (100 mL/Hr) IV Continuous <Continuous>  dextrose 50% Injectable 25 Gram(s) IV Push once  dextrose 50% Injectable 12.5 Gram(s) IV Push once  dextrose 50% Injectable 25 Gram(s) IV Push once  enoxaparin Injectable 40 milliGRAM(s) SubCutaneous every 24 hours  glucagon  Injectable 1 milliGRAM(s) IntraMuscular once  insulin glargine Injectable (LANTUS) 23 Unit(s) SubCutaneous at bedtime  insulin lispro (ADMELOG) corrective regimen sliding scale   SubCutaneous Before meals and at bedtime  nafcillin  IVPB 2 Gram(s) IV Intermittent every 4 hours    MEDICATIONS  (PRN):  acetaminophen   Tablet .. 650 milliGRAM(s) Oral every 6 hours PRN Mild Pain (1 - 3), Moderate Pain (4 - 6)   **INCOMPLETE NOTE   used: [id number]    OVERNIGHT EVENTS: No acute events overnight. Pt slept well.     SUBJECTIVE:  Patient seen and examined at bedside. Pt stated pain has decreased from a 5/10 to 4/10 today. Pt states that she is feeling much better since her admission. No fevers, chills, or SOB. No new complaints.     Vital Signs Last 12 Hrs  T(F): 97.9 (07-08-21 @ 09:48), Max: 98.2 (07-08-21 @ 06:16)  HR: 66 (07-08-21 @ 09:48) (61 - 66)  BP: 111/68 (07-08-21 @ 09:48) (111/68 - 114/67)  BP(mean): --  RR: 14 (07-08-21 @ 09:48) (14 - 17)  SpO2: 96% (07-08-21 @ 09:48) (95% - 96%)        PHYSICAL EXAM:  Constitutional: NAD, comfortable in bed.  HEENT: NC/AT, PERRLA, EOMI, no conjunctival pallor or scleral icterus, MMM  Neck: Supple, no JVD  Respiratory: CTA B/L. No w/r/r.   Cardiovascular: RRR, normal S1 and S2, no m/r/g.   Gastrointestinal: +BS, soft NTND, no guarding or rebound tenderness, no palpable masses   Extremities: wwp; no cyanosis, clubbing or edema. R toe mildly erythematous with an absent toe nail and an incision. No purulent fluid. Pt has tenderness only in the first metatarsal. There is no tenderness proximal to the first metatarsal.   Vascular: Pulses equal and strong throughout.   Neurological: AAOx3, no CN deficits, strength and sensation intact throughout.   Skin: No gross skin abnormalities or rashes        LABS:                        11.8   4.42  )-----------( 171      ( 07 Jul 2021 09:29 )             36.2     07-07    143  |  107  |  13  ----------------------------<  84  4.4   |  26  |  0.88    Ca    9.1      07 Jul 2021 09:29  Mg     2.1     07-07    TPro  7.6  /  Alb  4.6  /  TBili  0.4  /  DBili  x   /  AST  27  /  ALT  21  /  AlkPhos  87  07-06            RADIOLOGY  Xray Toes, Right Foot (07.06.21 @ 12:21)    IMPRESSION: Frontal, lateral and oblique views of the right great toe demonstrate interval increasing sclerosis and persistent lucency and fragmentation of the distal left of the great toe with overlying skin defect, consistent with chronic osteomyelitis.    MRI is more sensitive examination for detection of osteomyelitis.    MICROBIOLOGY    Culture - Tissue with Gram Stain (collected 06 Jul 2021 14:33)  Source: .Tissue Right hallux bone  Gram Stain (06 Jul 2021 16:27):    No organisms seen    No WBC's seen.  Preliminary Report (07 Jul 2021 08:12):    No growth to date    Note: Pathology report states that there was no bone on the tissue sample    Culture Results:   No growth to date    Culture - Blood (07.06.21 @ 13:31)    Specimen Source: .Blood Blood-Peripheral    Culture Results:   No growth at 1 day.      MEDICATIONS  (STANDING):  atorvastatin 40 milliGRAM(s) Oral at bedtime  dextrose 40% Gel 15 Gram(s) Oral once  dextrose 5%. 1000 milliLiter(s) (50 mL/Hr) IV Continuous <Continuous>  dextrose 5%. 1000 milliLiter(s) (100 mL/Hr) IV Continuous <Continuous>  dextrose 50% Injectable 25 Gram(s) IV Push once  dextrose 50% Injectable 12.5 Gram(s) IV Push once  dextrose 50% Injectable 25 Gram(s) IV Push once  enoxaparin Injectable 40 milliGRAM(s) SubCutaneous every 24 hours  glucagon  Injectable 1 milliGRAM(s) IntraMuscular once  insulin glargine Injectable (LANTUS) 23 Unit(s) SubCutaneous at bedtime  insulin lispro (ADMELOG) corrective regimen sliding scale   SubCutaneous Before meals and at bedtime  nafcillin  IVPB 2 Gram(s) IV Intermittent every 4 hours    MEDICATIONS  (PRN):  acetaminophen   Tablet .. 650 milliGRAM(s) Oral every 6 hours PRN Mild Pain (1 - 3), Moderate Pain (4 - 6)      used: [id number]    OVERNIGHT EVENTS: No acute events overnight. Pt slept well.     SUBJECTIVE:  Patient seen and examined at bedside. Pt stated pain has decreased from a 5/10 to 4/10 today. Pt states that she is feeling much better since her admission. No fevers, chills, or SOB. No new complaints.     Vital Signs Last 12 Hrs  T(F): 97.9 (07-08-21 @ 09:48), Max: 98.2 (07-08-21 @ 06:16)  HR: 66 (07-08-21 @ 09:48) (61 - 66)  BP: 111/68 (07-08-21 @ 09:48) (111/68 - 114/67)  BP(mean): --  RR: 14 (07-08-21 @ 09:48) (14 - 17)  SpO2: 96% (07-08-21 @ 09:48) (95% - 96%)        PHYSICAL EXAM:  Constitutional: NAD, comfortable in bed.  HEENT: NC/AT, PERRLA, EOMI, no conjunctival pallor or scleral icterus, MMM  Neck: Supple, no JVD  Respiratory: CTA B/L. No w/r/r.   Cardiovascular: RRR, normal S1 and S2, no m/r/g.   Gastrointestinal: +BS, soft NTND, no guarding or rebound tenderness, no palpable masses   Extremities: wwp; no cyanosis, clubbing or edema. R toe mildly erythematous with an absent toe nail and an incision. No purulent fluid. Pt has tenderness only in the first metatarsal. There is no tenderness proximal to the first metatarsal.   Vascular: Pulses equal and strong throughout.   Neurological: AAOx3, no CN deficits, strength and sensation intact throughout.   Skin: No gross skin abnormalities or rashes        LABS:                        11.8   4.42  )-----------( 171      ( 07 Jul 2021 09:29 )             36.2     07-07    143  |  107  |  13  ----------------------------<  84  4.4   |  26  |  0.88    Ca    9.1      07 Jul 2021 09:29  Mg     2.1     07-07    TPro  7.6  /  Alb  4.6  /  TBili  0.4  /  DBili  x   /  AST  27  /  ALT  21  /  AlkPhos  87  07-06            RADIOLOGY  Xray Toes, Right Foot (07.06.21 @ 12:21)    IMPRESSION: Frontal, lateral and oblique views of the right great toe demonstrate interval increasing sclerosis and persistent lucency and fragmentation of the distal left of the great toe with overlying skin defect, consistent with chronic osteomyelitis.    MRI is more sensitive examination for detection of osteomyelitis.    MICROBIOLOGY    Culture - Tissue with Gram Stain (collected 06 Jul 2021 14:33)  Source: .Tissue Right hallux bone  Gram Stain (06 Jul 2021 16:27):    No organisms seen    No WBC's seen.  Preliminary Report (07 Jul 2021 08:12):    No growth to date    Note: Pathology report states that there was no bone on the tissue sample    Culture Results:   No growth to date    Culture - Blood (07.06.21 @ 13:31)    Specimen Source: .Blood Blood-Peripheral    Culture Results:   No growth at 1 day.      MEDICATIONS  (STANDING):  atorvastatin 40 milliGRAM(s) Oral at bedtime  dextrose 40% Gel 15 Gram(s) Oral once  dextrose 5%. 1000 milliLiter(s) (50 mL/Hr) IV Continuous <Continuous>  dextrose 5%. 1000 milliLiter(s) (100 mL/Hr) IV Continuous <Continuous>  dextrose 50% Injectable 25 Gram(s) IV Push once  dextrose 50% Injectable 12.5 Gram(s) IV Push once  dextrose 50% Injectable 25 Gram(s) IV Push once  enoxaparin Injectable 40 milliGRAM(s) SubCutaneous every 24 hours  glucagon  Injectable 1 milliGRAM(s) IntraMuscular once  insulin glargine Injectable (LANTUS) 23 Unit(s) SubCutaneous at bedtime  insulin lispro (ADMELOG) corrective regimen sliding scale   SubCutaneous Before meals and at bedtime  nafcillin  IVPB 2 Gram(s) IV Intermittent every 4 hours    MEDICATIONS  (PRN):  acetaminophen   Tablet .. 650 milliGRAM(s) Oral every 6 hours PRN Mild Pain (1 - 3), Moderate Pain (4 - 6)

## 2021-07-08 NOTE — PROGRESS NOTE ADULT - ASSESSMENT
70 y/o F w/ PMHx HTN, DM Type 2, HLD presents today w/ right great toe pain + erythema + edema; has a hx of OM s/p 6 wks of IV Nafcillin (4/21-6/2) sent in by Dr. Dominguez for podiatry evaluation. Podiatry consulted for mgmt of Right hallux cellulitis + osteomyelitis non-responsive to prior treatment. Pt is s/p bedside bone biopsy of Right hallux distal phalanx; stab incision closed w/ nylon suture (7/6).     P:   - Continue IV abx per ID recs  - Right hallux dressed with gauze, kerlix, ACE  - F/U R hallux distal phalanx micro & path - cx NGTD  - Pathology sample is hard fibrous tissue, no bone was identified  - WBAT to R foot    Podiatry following

## 2021-07-08 NOTE — PROGRESS NOTE ADULT - PROBLEM SELECTOR PLAN 1
Pt was previously treated with 6 weeks for OM of the R toe for MSSA of Nafcillin.   X-ray showed R great toe deterioration consistent with chronic OM with no gas. ESR wnl. Pt did not meet SIRS criteria.   -Nafcillin 2g q4hr  -Would culture of right hallux showed no growth  -Pathology report noted no bone in wound culture  -Blood cultures showed no growth  -Follow podiatry recs  -ID recs Pt was previously treated with 6 weeks for OM of the R toe for MSSA of Nafcillin.   X-ray showed R great toe deterioration consistent with chronic OM with no gas. ESR wnl. Pt did not meet SIRS criteria.   -Nafcillin 2g q4hr  -Would culture of right hallux showed no growth  -Pathology report noted no bone in wound culture  -Blood cultures showed no growth  -Follow podiatry recs  -Follow ID recs Pt was previously treated with 6 weeks for OM of the R toe for MSSA of Nafcillin.   X-ray showed R great toe deterioration consistent with chronic OM with no gas. ESR wnl. Pt did not meet SIRS criteria.   -Nafcillin 2g q4hr  -Would culture of right hallux showed no growth  -Pathology report noted no bone in wound culture  -Blood cultures showed no growth  -Podiatry consulted  -ID consulted  -Pt does not want another 2 week course of Nafcillin  -MRI planned to R/O OM; findings may help Pt agree to treatment course

## 2021-07-08 NOTE — PROGRESS NOTE ADULT - SUBJECTIVE AND OBJECTIVE BOX
Patient is a 71y old  Female who presents with a chief complaint of Toe pain (07 Jul 2021 15:19)      INTERVAL HPI/ OVERNIGHT EVENTS: KIRAN O/N.       LABS                        11.8   4.42  )-----------( 171      ( 07 Jul 2021 09:29 )             36.2     07-07    143  |  107  |  13  ----------------------------<  84  4.4   |  26  |  0.88    Ca    9.1      07 Jul 2021 09:29  Mg     2.1     07-07    TPro  7.6  /  Alb  4.6  /  TBili  0.4  /  DBili  x   /  AST  27  /  ALT  21  /  AlkPhos  87  07-06      ESR: 7  CRP: --  07-07 @ 09:29    ICU Vital Signs Last 24 Hrs  T(C): 36.8 (08 Jul 2021 06:16), Max: 37 (07 Jul 2021 09:06)  T(F): 98.2 (08 Jul 2021 06:16), Max: 98.6 (07 Jul 2021 09:06)  HR: 61 (08 Jul 2021 06:16) (59 - 67)  BP: 114/67 (08 Jul 2021 06:16) (100/61 - 122/66)  BP(mean): --  ABP: --  ABP(mean): --  RR: 17 (08 Jul 2021 06:16) (17 - 18)  SpO2: 95% (08 Jul 2021 06:16) (94% - 97%)      RADIOLOGY  < from: Xray Toes, Right Foot (07.06.21 @ 12:21) >  IMPRESSION: Frontal, lateral and oblique views of the right great toe demonstrate interval increasing sclerosis and persistent lucency and fragmentation of the distal left of the great toe with overlying skin defect, consistent with chronic osteomyelitis.    MRI is more sensitive examination for detection of osteomyelitis.    < end of copied text >      MICROBIOLOGY  Culture - Tissue with Gram Stain (collected 06 Jul 2021 14:33)  Source: .Tissue Right hallux bone  Gram Stain (06 Jul 2021 16:27):    No organisms seen    No WBC's seen.  Preliminary Report (07 Jul 2021 08:12):    No growth to date      PHYSICAL EXAM  Lower Extremity Focused  Vasc: DP/PT 2/4 B/L. CFT brisk x 10. TG cool to cool. Mild edema to R hallux.   Derm: Mild erythema to right hallux. Healed stab incision to medial distal tip of right hallux.   Neuro: Protective sensation grossly intact b/l  MSK: 5/5 muscle strength in all crural compartments b/l. Ambulates independently w/o assistive device.  Patient is a 71y old  Female who presents with a chief complaint of Toe pain (07 Jul 2021 15:19)      INTERVAL HPI/ OVERNIGHT EVENTS: KIRAN O/N.       LABS                        11.8   4.42  )-----------( 171      ( 07 Jul 2021 09:29 )             36.2     07-07    143  |  107  |  13  ----------------------------<  84  4.4   |  26  |  0.88    Ca    9.1      07 Jul 2021 09:29  Mg     2.1     07-07    TPro  7.6  /  Alb  4.6  /  TBili  0.4  /  DBili  x   /  AST  27  /  ALT  21  /  AlkPhos  87  07-06      ESR: 7  CRP: --  07-07 @ 09:29    ICU Vital Signs Last 24 Hrs  T(C): 36.8 (08 Jul 2021 06:16), Max: 37 (07 Jul 2021 09:06)  T(F): 98.2 (08 Jul 2021 06:16), Max: 98.6 (07 Jul 2021 09:06)  HR: 61 (08 Jul 2021 06:16) (59 - 67)  BP: 114/67 (08 Jul 2021 06:16) (100/61 - 122/66)  BP(mean): --  ABP: --  ABP(mean): --  RR: 17 (08 Jul 2021 06:16) (17 - 18)  SpO2: 95% (08 Jul 2021 06:16) (94% - 97%)      RADIOLOGY  < from: Xray Toes, Right Foot (07.06.21 @ 12:21) >  IMPRESSION: Frontal, lateral and oblique views of the right great toe demonstrate interval increasing sclerosis and persistent lucency and fragmentation of the distal left of the great toe with overlying skin defect, consistent with chronic osteomyelitis.    MRI is more sensitive examination for detection of osteomyelitis.    < end of copied text >      MICROBIOLOGY  Culture - Tissue with Gram Stain (collected 06 Jul 2021 14:33)  Source: .Tissue Right hallux bone  Gram Stain (06 Jul 2021 16:27):    No organisms seen    No WBC's seen.  Preliminary Report (07 Jul 2021 08:12):    No growth to date      PHYSICAL EXAM  Lower Extremity Focused  Vasc: DP/PT 2/4 B/L. CFT brisk x 10. TG cool to cool. Mild edema to R hallux.   Derm: Mild erythema to right hallux. Healed stab incision to medial distal tip of right hallux, nylon suture in place.   Neuro: Protective sensation grossly intact b/l  MSK: 5/5 muscle strength in all crural compartments b/l. Ambulates independently w/o assistive device.

## 2021-07-08 NOTE — PROGRESS NOTE ADULT - ASSESSMENT
70 yo F with HTN, DM, HLD, likely recurrent OM R hallux, initial culture with MSSA and Staph lugdunensis.  Absence of fever, leukocytosis and elevation in inflammatory markers does not exclude.  Biopsy (done on TMP/SX) without bone, culture NGTD.  She initially improved following I&D and initiation of TMP/SX, then worsened.  She appears to have improved on nafcillin.  Discussed at length with Ms. Daley recommendation to repeat course of antibiotics.  She does not want to repeat IV course. In view of chronic course, can try to treat with po.  Is reasonable to repeat MRI to better delineate extent of infection.    Suggest:  - Continue to f/u blood cultures from 7/6  - Continue to f/u biopsy culture from 7/6  - MRI w/wo reynaldo  - Continue nafcillin 2 g IV q4h for now.  Recommendations discussed with Dr. Gomes.  Will follow with you - team 2.

## 2021-07-09 LAB
ANION GAP SERPL CALC-SCNC: 12 MMOL/L — SIGNIFICANT CHANGE UP (ref 5–17)
BUN SERPL-MCNC: 13 MG/DL — SIGNIFICANT CHANGE UP (ref 7–23)
CALCIUM SERPL-MCNC: 9 MG/DL — SIGNIFICANT CHANGE UP (ref 8.4–10.5)
CHLORIDE SERPL-SCNC: 104 MMOL/L — SIGNIFICANT CHANGE UP (ref 96–108)
CO2 SERPL-SCNC: 25 MMOL/L — SIGNIFICANT CHANGE UP (ref 22–31)
CREAT SERPL-MCNC: 0.65 MG/DL — SIGNIFICANT CHANGE UP (ref 0.5–1.3)
GLUCOSE BLDC GLUCOMTR-MCNC: 118 MG/DL — HIGH (ref 70–99)
GLUCOSE BLDC GLUCOMTR-MCNC: 138 MG/DL — HIGH (ref 70–99)
GLUCOSE BLDC GLUCOMTR-MCNC: 152 MG/DL — HIGH (ref 70–99)
GLUCOSE BLDC GLUCOMTR-MCNC: 205 MG/DL — HIGH (ref 70–99)
GLUCOSE SERPL-MCNC: 246 MG/DL — HIGH (ref 70–99)
POTASSIUM SERPL-MCNC: 4.1 MMOL/L — SIGNIFICANT CHANGE UP (ref 3.5–5.3)
POTASSIUM SERPL-SCNC: 4.1 MMOL/L — SIGNIFICANT CHANGE UP (ref 3.5–5.3)
SODIUM SERPL-SCNC: 141 MMOL/L — SIGNIFICANT CHANGE UP (ref 135–145)

## 2021-07-09 PROCEDURE — 99232 SBSQ HOSP IP/OBS MODERATE 35: CPT

## 2021-07-09 PROCEDURE — 99233 SBSQ HOSP IP/OBS HIGH 50: CPT | Mod: GC

## 2021-07-09 RX ORDER — BUDESONIDE AND FORMOTEROL FUMARATE DIHYDRATE 160; 4.5 UG/1; UG/1
2 AEROSOL RESPIRATORY (INHALATION)
Refills: 0 | Status: DISCONTINUED | OUTPATIENT
Start: 2021-07-09 | End: 2021-07-11

## 2021-07-09 RX ORDER — TIOTROPIUM BROMIDE 18 UG/1
1 CAPSULE ORAL; RESPIRATORY (INHALATION) AT BEDTIME
Refills: 0 | Status: DISCONTINUED | OUTPATIENT
Start: 2021-07-09 | End: 2021-07-11

## 2021-07-09 RX ADMIN — NAFCILLIN 200 GRAM(S): 10 INJECTION, POWDER, FOR SOLUTION INTRAVENOUS at 14:21

## 2021-07-09 RX ADMIN — NAFCILLIN 200 GRAM(S): 10 INJECTION, POWDER, FOR SOLUTION INTRAVENOUS at 02:00

## 2021-07-09 RX ADMIN — Medication 2: at 22:23

## 2021-07-09 RX ADMIN — NAFCILLIN 200 GRAM(S): 10 INJECTION, POWDER, FOR SOLUTION INTRAVENOUS at 21:44

## 2021-07-09 RX ADMIN — ENOXAPARIN SODIUM 40 MILLIGRAM(S): 100 INJECTION SUBCUTANEOUS at 14:21

## 2021-07-09 RX ADMIN — Medication 650 MILLIGRAM(S): at 19:52

## 2021-07-09 RX ADMIN — TIOTROPIUM BROMIDE 1 CAPSULE(S): 18 CAPSULE ORAL; RESPIRATORY (INHALATION) at 21:47

## 2021-07-09 RX ADMIN — Medication 4: at 17:09

## 2021-07-09 RX ADMIN — NAFCILLIN 200 GRAM(S): 10 INJECTION, POWDER, FOR SOLUTION INTRAVENOUS at 06:24

## 2021-07-09 RX ADMIN — INSULIN GLARGINE 18 UNIT(S): 100 INJECTION, SOLUTION SUBCUTANEOUS at 22:23

## 2021-07-09 RX ADMIN — ATORVASTATIN CALCIUM 40 MILLIGRAM(S): 80 TABLET, FILM COATED ORAL at 21:47

## 2021-07-09 RX ADMIN — BUDESONIDE AND FORMOTEROL FUMARATE DIHYDRATE 2 PUFF(S): 160; 4.5 AEROSOL RESPIRATORY (INHALATION) at 18:15

## 2021-07-09 RX ADMIN — Medication 650 MILLIGRAM(S): at 20:30

## 2021-07-09 RX ADMIN — NAFCILLIN 200 GRAM(S): 10 INJECTION, POWDER, FOR SOLUTION INTRAVENOUS at 09:58

## 2021-07-09 RX ADMIN — NAFCILLIN 200 GRAM(S): 10 INJECTION, POWDER, FOR SOLUTION INTRAVENOUS at 18:15

## 2021-07-09 NOTE — PROGRESS NOTE ADULT - PROBLEM SELECTOR PLAN 3
On Basaglar 30 at home  A1C 3 months ago was 6.7; seems well controlled  -Inpatient [facility]   -lantus 18 U On Basaglar 30 at home  A1C 3 months ago was 6.7; seems well controlled  -Inpatient [facility]   -continue w/ lantus 18 U; has helped control POCT

## 2021-07-09 NOTE — PROGRESS NOTE ADULT - SUBJECTIVE AND OBJECTIVE BOX
Patient is a 71y old  Female who presents with a chief complaint of Toe pain (09 Jul 2021 08:08)      INTERVAL HPI/ OVERNIGHT EVENTS: No overnight events      LABS                        11.8   4.42  )-----------( 171      ( 07 Jul 2021 09:29 )             36.2     07-07    143  |  107  |  13  ----------------------------<  84  4.4   |  26  |  0.88    Ca    9.1      07 Jul 2021 09:29  Mg     2.1     07-07          ICU Vital Signs Last 24 Hrs  T(C): 36.9 (09 Jul 2021 05:14), Max: 36.9 (09 Jul 2021 05:14)  T(F): 98.5 (09 Jul 2021 05:14), Max: 98.5 (09 Jul 2021 05:14)  HR: 60 (09 Jul 2021 05:14) (60 - 70)  BP: 108/65 (09 Jul 2021 05:14) (108/65 - 131/74)  BP(mean): --  ABP: --  ABP(mean): --  RR: 16 (09 Jul 2021 05:14) (14 - 17)  SpO2: 95% (09 Jul 2021 05:14) (94% - 96%)        PHYSICAL EXAM  Lower Extremity Focused  Vasc: DP/PT 2/4 B/L. CFT brisk x 10. TG cool to cool. Mild edema to R hallux.   Derm: Mild erythema to right hallux. Healed stab incision to medial distal tip of right hallux, nylon suture in place.   Neuro: Protective sensation grossly intact b/l  MSK: 5/5 muscle strength in all crural compartments b/l. Ambulates independently w/o assistive device.

## 2021-07-09 NOTE — DIETITIAN INITIAL EVALUATION ADULT. - PROBLEM SELECTOR PLAN 7
Fluids: None  Electrolytes: replete as necessary, K>4, Mg>2  Nutrition: consistent carb  Bowel Regimen: None  DVT ppx: lovenox 40  GI ppx: None  Code: DNR/DNI  Disposition: medical floor

## 2021-07-09 NOTE — DIETITIAN INITIAL EVALUATION ADULT. - ADD RECOMMEND
1. Continue consistent carbohydrate diet 2. Trend wts 3. Monitor lytes, glucose, skin 4. Provide education prn

## 2021-07-09 NOTE — DIETITIAN INITIAL EVALUATION ADULT. - PROBLEM SELECTOR PLAN 6
Current smoker counseling offered. No hx hospitalizations, no SOB or wheezing. Home meds symbicoty and ellipta   -C/w home inhalers

## 2021-07-09 NOTE — PROGRESS NOTE ADULT - ASSESSMENT
70 yo F with HTN, DM, HLD, likely recurrent OM R hallux, initial culture with MSSA and Staph lugdunensis.  Absence of fever, leukocytosis and elevation in inflammatory markers does not exclude.  Biopsy (done on TMP/SX) without bone, culture NGTD.  She initially improved following I&D and initiation of TMP/SX, then worsened.  She appears to have improved on nafcillin.  Spoke today with outpatient podiastrist who did I&D - she did not obtain significant purulence but was concerned that she had a collection    Suggest:  - Continue to f/u blood cultures from 7/6  - Continue to f/u biopsy culture from 7/6  - MRI w/wo reynaldo  - Continue nafcillin 2 g IV q4h for now.    - If evidence for ongoing infection on MRI and no collection, can d/c on prolonged course of cephalexin 500 mg po q6h.  Recommendations discussed with Dr. Gomes.  Will follow with you - team 2.  Dr. Murray will cover from [facility] through 7/11, I will return 7/12.

## 2021-07-09 NOTE — PROGRESS NOTE ADULT - SUBJECTIVE AND OBJECTIVE BOX
**INCOMPLETE NOTE    OVERNIGHT EVENTS: no acute events over night. Pt slept well. No fevers, chills, or SOB.     SUBJECTIVE:  Pt stated that her pain is now minimal rating it a 4/10. Pt has not other complaints at the time. Patient seen and examined at bedside.    Vital Signs Last 12 Hrs  T(F): 98.5 (07-09-21 @ 05:14), Max: 98.5 (07-09-21 @ 05:14)  HR: 60 (07-09-21 @ 05:14) (60 - 66)  BP: 108/65 (07-09-21 @ 05:14) (108/65 - 109/63)  BP(mean): --  RR: 16 (07-09-21 @ 05:14) (16 - 17)  SpO2: 95% (07-09-21 @ 05:14) (94% - 95%)  I&O's Summary    08 Jul 2021 07:01  -  09 Jul 2021 07:00  --------------------------------------------------------  IN: 600 mL / OUT: 0 mL / NET: 600 mL        PHYSICAL EXAM:  Constitutional: NAD, comfortable in bed.  HEENT: NC/AT, PERRLA, EOMI, no conjunctival pallor or scleral icterus, MMM  Neck: Supple, no JVD  Respiratory: CTA B/L. No w/r/r.   Cardiovascular: RRR, normal S1 and S2, no m/r/g.   Gastrointestinal: +BS, soft NTND, no guarding or rebound tenderness, no palpable masses   Extremities: no cyanosis, clubbing or edema. Bandage wrap on R foot up to ankle.  Vascular: Pulses equal and strong throughout.   Neurological: AAOx3, no CN deficits, strength and sensation intact throughout.   Skin: No gross skin abnormalities or rashes        LABS:                        11.8   4.42  )-----------( 171      ( 07 Jul 2021 09:29 )             36.2     07-07    143  |  107  |  13  ----------------------------<  84  4.4   |  26  |  0.88    Ca    9.1      07 Jul 2021 09:29  Mg     2.1     07-07      RADIOLOGY  Xray Toes, Right Foot (07.06.21 @ 12:21)    IMPRESSION: Frontal, lateral and oblique views of the right great toe demonstrate interval increasing sclerosis and persistent lucency and fragmentation of the distal left of the great toe with overlying skin defect, consistent with chronic osteomyelitis.    MRI is more sensitive examination for detection of osteomyelitis.    MICROBIOLOGY    Culture - Tissue with Gram Stain (collected 06 Jul 2021 14:33)  Source: .Tissue Right hallux bone  Gram Stain (06 Jul 2021 16:27):    No organisms seen    No WBC's seen.  Preliminary Report (07 Jul 2021 08:12):    No growth to date    Note: Pathology report states that there was no bone on the tissue sample    Culture Results:   No growth to date    Culture - Blood (07.06.21 @ 13:31)    Specimen Source: .Blood Blood-Peripheral    Culture Results:   No growth to date    MEDICATIONS  (STANDING):  atorvastatin 40 milliGRAM(s) Oral at bedtime  dextrose 40% Gel 15 Gram(s) Oral once  dextrose 5%. 1000 milliLiter(s) (50 mL/Hr) IV Continuous <Continuous>  dextrose 5%. 1000 milliLiter(s) (100 mL/Hr) IV Continuous <Continuous>  dextrose 50% Injectable 25 Gram(s) IV Push once  dextrose 50% Injectable 12.5 Gram(s) IV Push once  dextrose 50% Injectable 25 Gram(s) IV Push once  enoxaparin Injectable 40 milliGRAM(s) SubCutaneous every 24 hours  glucagon  Injectable 1 milliGRAM(s) IntraMuscular once  insulin glargine Injectable (LANTUS) 18 Unit(s) SubCutaneous at bedtime  insulin lispro (ADMELOG) corrective regimen sliding scale   SubCutaneous Before meals and at bedtime  nafcillin  IVPB 2 Gram(s) IV Intermittent every 4 hours    MEDICATIONS  (PRN):  acetaminophen   Tablet .. 650 milliGRAM(s) Oral every 6 hours PRN Mild Pain (1 - 3), Moderate Pain (4 - 6)   **INCOMPLETE NOTE   used: Moris #[id number]    OVERNIGHT EVENTS: no acute events over night. Pt slept well. No fevers, chills, or SOB.     SUBJECTIVE:  Pt stated that her pain is now minimal rating it a 4/10. Pt has not other complaints at the time. Patient seen and examined at bedside.    Vital Signs Last 12 Hrs  T(F): 98.5 (07-09-21 @ 05:14), Max: 98.5 (07-09-21 @ 05:14)  HR: 60 (07-09-21 @ 05:14) (60 - 66)  BP: 108/65 (07-09-21 @ 05:14) (108/65 - 109/63)  BP(mean): --  RR: 16 (07-09-21 @ 05:14) (16 - 17)  SpO2: 95% (07-09-21 @ 05:14) (94% - 95%)  I&O's Summary    08 Jul 2021 07:01  -  09 Jul 2021 07:00  --------------------------------------------------------  IN: 600 mL / OUT: 0 mL / NET: 600 mL        PHYSICAL EXAM:  Constitutional: NAD, comfortable in bed.  HEENT: NC/AT, PERRLA, EOMI, no conjunctival pallor or scleral icterus, MMM  Neck: Supple, no JVD  Respiratory: CTA B/L. No w/r/r.   Cardiovascular: RRR, normal S1 and S2, no m/r/g.   Gastrointestinal: +BS, soft NTND, no guarding or rebound tenderness, no palpable masses   Extremities: no cyanosis, clubbing or edema. Bandage wrap on R foot up to ankle.  Vascular: Pulses equal and strong throughout.   Neurological: AAOx3, no CN deficits, strength and sensation intact throughout.   Skin: No gross skin abnormalities or rashes        LABS:                        11.8   4.42  )-----------( 171      ( 07 Jul 2021 09:29 )             36.2     07-07    143  |  107  |  13  ----------------------------<  84  4.4   |  26  |  0.88    Ca    9.1      07 Jul 2021 09:29  Mg     2.1     07-07      RADIOLOGY  Xray Toes, Right Foot (07.06.21 @ 12:21)    IMPRESSION: Frontal, lateral and oblique views of the right great toe demonstrate interval increasing sclerosis and persistent lucency and fragmentation of the distal left of the great toe with overlying skin defect, consistent with chronic osteomyelitis.    MRI is more sensitive examination for detection of osteomyelitis.    MICROBIOLOGY    Culture - Tissue with Gram Stain (collected 06 Jul 2021 14:33)  Source: .Tissue Right hallux bone  Gram Stain (06 Jul 2021 16:27):    No organisms seen    No WBC's seen.  Preliminary Report (07 Jul 2021 08:12):    No growth to date    Note: Pathology report states that there was no bone on the tissue sample    Culture Results:   No growth to date    Culture - Blood (07.06.21 @ 13:31)    Specimen Source: .Blood Blood-Peripheral    Culture Results:   No growth to date    MEDICATIONS  (STANDING):  atorvastatin 40 milliGRAM(s) Oral at bedtime  dextrose 40% Gel 15 Gram(s) Oral once  dextrose 5%. 1000 milliLiter(s) (50 mL/Hr) IV Continuous <Continuous>  dextrose 5%. 1000 milliLiter(s) (100 mL/Hr) IV Continuous <Continuous>  dextrose 50% Injectable 25 Gram(s) IV Push once  dextrose 50% Injectable 12.5 Gram(s) IV Push once  dextrose 50% Injectable 25 Gram(s) IV Push once  enoxaparin Injectable 40 milliGRAM(s) SubCutaneous every 24 hours  glucagon  Injectable 1 milliGRAM(s) IntraMuscular once  insulin glargine Injectable (LANTUS) 18 Unit(s) SubCutaneous at bedtime  insulin lispro (ADMELOG) corrective regimen sliding scale   SubCutaneous Before meals and at bedtime  nafcillin  IVPB 2 Gram(s) IV Intermittent every 4 hours    MEDICATIONS  (PRN):  acetaminophen   Tablet .. 650 milliGRAM(s) Oral every 6 hours PRN Mild Pain (1 - 3), Moderate Pain (4 - 6)   **INCOMPLETE NOTE   used: Moris #[id number]    OVERNIGHT EVENTS: no acute events over night. Pt slept well. No fevers, chills, or SOB.     SUBJECTIVE:  Pt stated that her R hallux pain is now minimal rating it a 4/10. Pt has no other complaints at the time. Patient seen and examined at bedside.    Vital Signs Last 12 Hrs  T(F): 98.5 (07-09-21 @ 05:14), Max: 98.5 (07-09-21 @ 05:14)  HR: 60 (07-09-21 @ 05:14) (60 - 66)  BP: 108/65 (07-09-21 @ 05:14) (108/65 - 109/63)  BP(mean): --  RR: 16 (07-09-21 @ 05:14) (16 - 17)  SpO2: 95% (07-09-21 @ 05:14) (94% - 95%)  I&O's Summary    08 Jul 2021 07:01  -  09 Jul 2021 07:00  --------------------------------------------------------  IN: 600 mL / OUT: 0 mL / NET: 600 mL        PHYSICAL EXAM:  Constitutional: NAD, comfortable in bed.  HEENT: NC/AT, PERRLA, EOMI, no conjunctival pallor or scleral icterus, MMM  Neck: Supple, no JVD  Respiratory: CTA B/L. No w/r/r.   Cardiovascular: RRR, normal S1 and S2, no m/r/g.   Gastrointestinal: +BS, soft NTND, no guarding or rebound tenderness, no palpable masses   Extremities: no cyanosis, clubbing or edema. Bandage wrap on R foot up to ankle. Toe nail removed.   Vascular: Pulses equal and strong throughout.   Neurological: AAOx3, no CN deficits, strength and sensation intact throughout.   Skin: No gross skin abnormalities or rashes        LABS:                        11.8   4.42  )-----------( 171      ( 07 Jul 2021 09:29 )             36.2     07-07    143  |  107  |  13  ----------------------------<  84  4.4   |  26  |  0.88    Ca    9.1      07 Jul 2021 09:29  Mg     2.1     07-07      RADIOLOGY  Xray Toes, Right Foot (07.06.21 @ 12:21)    IMPRESSION: Frontal, lateral and oblique views of the right great toe demonstrate interval increasing sclerosis and persistent lucency and fragmentation of the distal left of the great toe with overlying skin defect, consistent with chronic osteomyelitis.    MRI is more sensitive examination for detection of osteomyelitis.    MICROBIOLOGY    Culture - Tissue with Gram Stain (collected 06 Jul 2021 14:33)  Source: .Tissue Right hallux bone  Gram Stain (06 Jul 2021 16:27):    No organisms seen    No WBC's seen.  Preliminary Report (07 Jul 2021 08:12):    No growth to date    Note: Pathology report states that there was no bone on the tissue sample    Culture Results:   No growth to date    Culture - Blood (07.06.21 @ 13:31)    Specimen Source: .Blood Blood-Peripheral    Culture Results:   No growth to date    MEDICATIONS  (STANDING):  atorvastatin 40 milliGRAM(s) Oral at bedtime  dextrose 40% Gel 15 Gram(s) Oral once  dextrose 5%. 1000 milliLiter(s) (50 mL/Hr) IV Continuous <Continuous>  dextrose 5%. 1000 milliLiter(s) (100 mL/Hr) IV Continuous <Continuous>  dextrose 50% Injectable 25 Gram(s) IV Push once  dextrose 50% Injectable 12.5 Gram(s) IV Push once  dextrose 50% Injectable 25 Gram(s) IV Push once  enoxaparin Injectable 40 milliGRAM(s) SubCutaneous every 24 hours  glucagon  Injectable 1 milliGRAM(s) IntraMuscular once  insulin glargine Injectable (LANTUS) 18 Unit(s) SubCutaneous at bedtime  insulin lispro (ADMELOG) corrective regimen sliding scale   SubCutaneous Before meals and at bedtime  nafcillin  IVPB 2 Gram(s) IV Intermittent every 4 hours    MEDICATIONS  (PRN):  acetaminophen   Tablet .. 650 milliGRAM(s) Oral every 6 hours PRN Mild Pain (1 - 3), Moderate Pain (4 - 6)    used: Kiddy #[id number]    OVERNIGHT EVENTS: no acute events over night. Pt slept well. No fevers, chills, or SOB.     SUBJECTIVE:  Pt stated that her R hallux pain is now minimal rating it a 4/10. Pt has no other complaints at the time. Patient seen and examined at bedside.    Vital Signs Last 12 Hrs  T(F): 98.5 (07-09-21 @ 05:14), Max: 98.5 (07-09-21 @ 05:14)  HR: 60 (07-09-21 @ 05:14) (60 - 66)  BP: 108/65 (07-09-21 @ 05:14) (108/65 - 109/63)  BP(mean): --  RR: 16 (07-09-21 @ 05:14) (16 - 17)  SpO2: 95% (07-09-21 @ 05:14) (94% - 95%)  I&O's Summary    08 Jul 2021 07:01  -  09 Jul 2021 07:00  --------------------------------------------------------  IN: 600 mL / OUT: 0 mL / NET: 600 mL        PHYSICAL EXAM:  Constitutional: NAD, comfortable in bed.  HEENT: NC/AT, PERRLA, EOMI, no conjunctival pallor or scleral icterus, MMM  Neck: Supple, no JVD  Respiratory: CTA B/L. No w/r/r.   Cardiovascular: RRR, normal S1 and S2, no m/r/g.   Gastrointestinal: +BS, soft NTND, no guarding or rebound tenderness, no palpable masses   Extremities: no cyanosis, clubbing or edema. Bandage wrap on R foot up to ankle. Toe nail removed.   Vascular: Pulses equal and strong throughout.   Neurological: AAOx3, no CN deficits, strength and sensation intact throughout.   Skin: No gross skin abnormalities or rashes        LABS:                        11.8   4.42  )-----------( 171      ( 07 Jul 2021 09:29 )             36.2     07-07    143  |  107  |  13  ----------------------------<  84  4.4   |  26  |  0.88    Ca    9.1      07 Jul 2021 09:29  Mg     2.1     07-07      RADIOLOGY  Xray Toes, Right Foot (07.06.21 @ 12:21)    IMPRESSION: Frontal, lateral and oblique views of the right great toe demonstrate interval increasing sclerosis and persistent lucency and fragmentation of the distal left of the great toe with overlying skin defect, consistent with chronic osteomyelitis.    MRI is more sensitive examination for detection of osteomyelitis.    MICROBIOLOGY    Culture - Tissue with Gram Stain (collected 06 Jul 2021 14:33)  Source: .Tissue Right hallux bone  Gram Stain (06 Jul 2021 16:27):    No organisms seen    No WBC's seen.  Preliminary Report (07 Jul 2021 08:12):    No growth to date    Note: Pathology report states that there was no bone on the tissue sample    Culture Results:   No growth to date    Culture - Blood (07.06.21 @ 13:31)    Specimen Source: .Blood Blood-Peripheral    Culture Results:   No growth to date    MEDICATIONS  (STANDING):  atorvastatin 40 milliGRAM(s) Oral at bedtime  dextrose 40% Gel 15 Gram(s) Oral once  dextrose 5%. 1000 milliLiter(s) (50 mL/Hr) IV Continuous <Continuous>  dextrose 5%. 1000 milliLiter(s) (100 mL/Hr) IV Continuous <Continuous>  dextrose 50% Injectable 25 Gram(s) IV Push once  dextrose 50% Injectable 12.5 Gram(s) IV Push once  dextrose 50% Injectable 25 Gram(s) IV Push once  enoxaparin Injectable 40 milliGRAM(s) SubCutaneous every 24 hours  glucagon  Injectable 1 milliGRAM(s) IntraMuscular once  insulin glargine Injectable (LANTUS) 18 Unit(s) SubCutaneous at bedtime  insulin lispro (ADMELOG) corrective regimen sliding scale   SubCutaneous Before meals and at bedtime  nafcillin  IVPB 2 Gram(s) IV Intermittent every 4 hours    MEDICATIONS  (PRN):  acetaminophen   Tablet .. 650 milliGRAM(s) Oral every 6 hours PRN Mild Pain (1 - 3), Moderate Pain (4 - 6)

## 2021-07-09 NOTE — PROGRESS NOTE ADULT - ASSESSMENT
70 y/o F w/ PMHx HTN, DM Type 2, HLD presents today w/ right great toe pain + erythema + edema; has a hx of OM s/p 6 wks of IV Nafcillin (4/21-6/2) sent in by Dr. Dominguez for podiatry evaluation. Podiatry consulted for mgmt of Right hallux cellulitis + osteomyelitis non-responsive to prior treatment. Pt is s/p bedside bone biopsy of Right hallux distal phalanx; stab incision closed w/ nylon suture (7/6).     P:   - Continue IV abx per ID recs  - Right hallux dressed with gauze, kerlix, ACE  - F/U R hallux distal phalanx micro - cx NGTD  - Pathology sample is hard fibrous tissue, no bone was identified  - WBAT to R foot    Podiatry following

## 2021-07-09 NOTE — PROGRESS NOTE ADULT - PROBLEM SELECTOR PLAN 1
Pt was previously treated with 6 weeks for OM of the R toe for MSSA of Nafcillin.   X-ray showed R great toe deterioration consistent with chronic OM with no gas. ESR wnl. Pt did not meet SIRS criteria.   -continue Nafcillin 2g q4hr day 4  -Would culture of right hallux showed no growth to date  -Pathology report noted no bone in wound culture  -Blood cultures showed no growth to date  -Podiatry consulted  -ID consulted  -MRI today to R/O OM  -Talk to Pt about continuation of nafcillin after discharge pending MRI results Pt was previously treated with 6 weeks for OM of the R toe for MSSA of Nafcillin.   X-ray showed R great toe deterioration consistent with chronic OM with no gas. ESR wnl. Pt did not meet SIRS criteria.   -continue Nafcillin 2g q4hr day 4  -Would culture of right hallux showed no growth to date  -Pathology report noted no bone in wound culture  -Blood cultures showed no growth to date  -Podiatry consulted  -ID consulted  -MRI outpatient  -Talk to Pt about continuation of nafcillin after discharge pending MRI results Pt was previously treated with 6 weeks for OM of the R toe for MSSA of Nafcillin.   X-ray showed R great toe deterioration consistent with chronic OM with no gas. ESR wnl. Pt did not meet SIRS criteria.   -continue Nafcillin 2g q4hr day 4  -Would culture of right hallux showed no growth to date  -Pathology report noted no bone in wound culture  -Blood cultures showed no growth to date  -Podiatry consulted  -ID consulted Pt was previously treated with 6 weeks for OM of the R toe for MSSA of Nafcillin.   X-ray showed R great toe deterioration consistent with chronic OM with no gas. ESR wnl. Pt did not meet SIRS criteria.   -continue Nafcillin 2g q4hr day 4  -Would culture of right hallux showed no growth to date  -Pathology report noted no bone in wound culture  -Blood cultures showed no growth to date  -Podiatry consulted  -ID consulted  -F/u MRI to confirm diagnosis of osteomyelitis

## 2021-07-09 NOTE — DIETITIAN INITIAL EVALUATION ADULT. - PROBLEM SELECTOR PLAN 2
Pain likely related to osteomyelitis of toe.  -Acetaminophen 650mg q6h PRN  -If pain not controlled by tylenol, low dose morphine for severe pain

## 2021-07-09 NOTE — DIETITIAN INITIAL EVALUATION ADULT. - PROBLEM SELECTOR PLAN 1
Does not meet SIRS. XR showed destruction of R great toe consistent with chronic OM, no gas. Treated at [facility] for infection of R foot (MSSA) 6 weeks of Nafcillin. April MRI Osteomyelitis of the first distal phalanx with osseous destruction and intraosseous abscess and mild synovitis of first interphalangeal joint without osseous changes of the proximal phalanx. XR was negative for OM. ESR wnl. No organisms seen on gram stain.  Possible surrounding soft tissue infection vs active osteo.  -Nafcillin 2g q4hr   -F/u wound culture and bone biopsy  - Follow ID and podiatry recs

## 2021-07-09 NOTE — PROGRESS NOTE ADULT - SUBJECTIVE AND OBJECTIVE BOX
INTERVAL HPI/OVERNIGHT EVENTS:  Afebrile.  R hallux pain is 4/10 - unchanged from yesterday    CONSTITUTIONAL:  No fever, chills, night sweats  EYES:  No photophobia or visual changes  CARDIOVASCULAR:  No chest pain  RESPIRATORY:  No cough, wheezing, or SOB   GASTROINTESTINAL:  No nausea, vomiting, diarrhea, constipation, or abdominal pain  GENITOURINARY:  No frequency, urgency, dysuria or hematuria  NEUROLOGIC:  No headache, lightheadedness      ANTIBIOTICS/RELEVANT:    Nafcillin 2 g IV q4h (7/5-present)      Vital Signs Last 24 Hrs  T(C): 36.6 (09 Jul 2021 16:39), Max: 36.9 (09 Jul 2021 05:14)  T(F): 97.9 (09 Jul 2021 16:39), Max: 98.5 (09 Jul 2021 05:14)  HR: 65 (09 Jul 2021 16:39) (60 - 78)  BP: 111/63 (09 Jul 2021 16:39) (108/65 - 111/63)  BP(mean): --  RR: 18 (09 Jul 2021 16:39) (16 - 18)  SpO2: 96% (09 Jul 2021 16:39) (94% - 97%)    PHYSICAL EXAM:  Constitutional:  Well-developed, well nourished  Eyes:  Sclerae anicteric, conjunctivae clear, PERRL  Ear/Nose/Throat:  No nasal exudate or sinus tenderness;  No buccal mucosal lesions, no pharyngeal erythema or exudate	  Neck:  Supple, no adenopathy  Respiratory:  Clear bilaterally  Cardiovascular:  RRR, S1S2, no murmur appreciated  Gastrointestinal:  Symmetric, normoactive BS, soft, NT, no masses, guarding or rebound.  No HSM  Extremities:  No edema.  R foot wrapped by Podiatry      LABS:        07-09    141  |  104  |  13  ----------------------------<  246<H>  4.1   |  25  |  0.65    Ca    9.0      09 Jul 2021 11:04            MICROBIOLOGY:  Blood cultures:  7/6 X 2 - NGTD    Tissue R hallux bone 7/6 - NGTD        RADIOLOGY & ADDITIONAL STUDIES:

## 2021-07-09 NOTE — PROGRESS NOTE ADULT - ASSESSMENT
71F w/ PMH HTN, HLD, asthma, DM, and prior diagnosis of R. hallux OM s/p antibiotics presented with pain in R. toe being treated for possible acute on chronic OM.

## 2021-07-09 NOTE — DIETITIAN INITIAL EVALUATION ADULT. - OTHER INFO
71F w/ PMH HTN, HLD, asthma, DM, and prior diagnosis of R. hallux OM s/p antibiotics presented with pain in R. toe being treated for possible acute on chronic OM.    Pt seen in room, resting in bed. Family member present. Pt reports good appetite PTA and currently. On consistent carbohydrate diet, eating >50% of breakfast this AM. No N/V/D/C noted. Pt reports 7lb wt loss since March 2/2 being in hospital and not feeling well while on antibiotics, (141-134lbs reflects 4.9% non significant wt loss). RD discussed current diet as well as encouraged PO intake, pt expressed understanding. No pain noted. Please see full recs below. Will continue to follow per RD protocol.

## 2021-07-10 LAB
GLUCOSE BLDC GLUCOMTR-MCNC: 110 MG/DL — HIGH (ref 70–99)
GLUCOSE BLDC GLUCOMTR-MCNC: 118 MG/DL — HIGH (ref 70–99)
GLUCOSE BLDC GLUCOMTR-MCNC: 182 MG/DL — HIGH (ref 70–99)
GLUCOSE BLDC GLUCOMTR-MCNC: 253 MG/DL — HIGH (ref 70–99)

## 2021-07-10 PROCEDURE — 73720 MRI LWR EXTREMITY W/O&W/DYE: CPT | Mod: 26,RT

## 2021-07-10 PROCEDURE — 99233 SBSQ HOSP IP/OBS HIGH 50: CPT | Mod: GC

## 2021-07-10 RX ADMIN — BUDESONIDE AND FORMOTEROL FUMARATE DIHYDRATE 2 PUFF(S): 160; 4.5 AEROSOL RESPIRATORY (INHALATION) at 05:44

## 2021-07-10 RX ADMIN — NAFCILLIN 200 GRAM(S): 10 INJECTION, POWDER, FOR SOLUTION INTRAVENOUS at 19:43

## 2021-07-10 RX ADMIN — ENOXAPARIN SODIUM 40 MILLIGRAM(S): 100 INJECTION SUBCUTANEOUS at 14:39

## 2021-07-10 RX ADMIN — NAFCILLIN 200 GRAM(S): 10 INJECTION, POWDER, FOR SOLUTION INTRAVENOUS at 10:17

## 2021-07-10 RX ADMIN — NAFCILLIN 200 GRAM(S): 10 INJECTION, POWDER, FOR SOLUTION INTRAVENOUS at 15:54

## 2021-07-10 RX ADMIN — NAFCILLIN 200 GRAM(S): 10 INJECTION, POWDER, FOR SOLUTION INTRAVENOUS at 01:35

## 2021-07-10 RX ADMIN — BUDESONIDE AND FORMOTEROL FUMARATE DIHYDRATE 2 PUFF(S): 160; 4.5 AEROSOL RESPIRATORY (INHALATION) at 19:43

## 2021-07-10 RX ADMIN — TIOTROPIUM BROMIDE 1 CAPSULE(S): 18 CAPSULE ORAL; RESPIRATORY (INHALATION) at 22:47

## 2021-07-10 RX ADMIN — Medication 2: at 22:45

## 2021-07-10 RX ADMIN — Medication 6: at 12:07

## 2021-07-10 RX ADMIN — INSULIN GLARGINE 18 UNIT(S): 100 INJECTION, SOLUTION SUBCUTANEOUS at 22:47

## 2021-07-10 RX ADMIN — ATORVASTATIN CALCIUM 40 MILLIGRAM(S): 80 TABLET, FILM COATED ORAL at 22:47

## 2021-07-10 RX ADMIN — NAFCILLIN 200 GRAM(S): 10 INJECTION, POWDER, FOR SOLUTION INTRAVENOUS at 05:44

## 2021-07-10 NOTE — PROGRESS NOTE ADULT - SUBJECTIVE AND OBJECTIVE BOX
OVERNIGHT EVENTS: melissa    SUBJECTIVE:  Patient seen and examined at bedside. Reports no increase in pain, cp, sob, n/v/d, fevers or chills.  Toe wrapping was changed. Patient is pending MRI    Vital Signs Last 12 Hrs  T(F): 98.1 (07-10-21 @ 09:52), Max: 98.1 (07-10-21 @ 09:52)  HR: 60 (07-10-21 @ 09:52) (60 - 66)  BP: 118/71 (07-10-21 @ 09:52) (117/65 - 118/71)  BP(mean): --  RR: 17 (07-10-21 @ 09:52) (16 - 17)  SpO2: 95% (07-10-21 @ 09:52) (95% - 98%)  I&O's Summary    09 Jul 2021 07:01  -  10 Jul 2021 07:00  --------------------------------------------------------  IN: 200 mL / OUT: 0 mL / NET: 200 mL        PHYSICAL EXAM:  Constitutional: NAD, comfortable in bed.  HEENT: NC/AT, PERRLA, EOMI, no conjunctival pallor or scleral icterus, MMM  Neck: Supple, no JVD  Respiratory: CTA B/L. No w/r/r.   Cardiovascular: RRR, normal S1 and S2, no m/r/g.   Gastrointestinal: +BS, soft NTND, no guarding or rebound tenderness, no palpable masses   Extremities: no cyanosis, clubbing or edema. Bandage wrap on R foot up to ankle. Toe nail removed.   Vascular: Pulses equal and strong throughout.   Neurological: AAOx3, no CN deficits, strength and sensation intact throughout.   Skin: No gross skin abnormalities or rashes      LABS:    07-09    141  |  104  |  13  ----------------------------<  246<H>  4.1   |  25  |  0.65    Ca    9.0      09 Jul 2021 11:04              RADIOLOGY & ADDITIONAL TESTS:    MEDICATIONS  (STANDING):  atorvastatin 40 milliGRAM(s) Oral at bedtime  budesonide 160 MICROgram(s)/formoterol 4.5 MICROgram(s) Inhaler 2 Puff(s) Inhalation two times a day  dextrose 40% Gel 15 Gram(s) Oral once  dextrose 5%. 1000 milliLiter(s) (50 mL/Hr) IV Continuous <Continuous>  dextrose 5%. 1000 milliLiter(s) (100 mL/Hr) IV Continuous <Continuous>  dextrose 50% Injectable 25 Gram(s) IV Push once  dextrose 50% Injectable 12.5 Gram(s) IV Push once  dextrose 50% Injectable 25 Gram(s) IV Push once  enoxaparin Injectable 40 milliGRAM(s) SubCutaneous every 24 hours  glucagon  Injectable 1 milliGRAM(s) IntraMuscular once  insulin glargine Injectable (LANTUS) 18 Unit(s) SubCutaneous at bedtime  insulin lispro (ADMELOG) corrective regimen sliding scale   SubCutaneous Before meals and at bedtime  nafcillin  IVPB 2 Gram(s) IV Intermittent every 4 hours  tiotropium 18 MICROgram(s) Capsule 1 Capsule(s) Inhalation at bedtime    MEDICATIONS  (PRN):  acetaminophen   Tablet .. 650 milliGRAM(s) Oral every 6 hours PRN Mild Pain (1 - 3), Moderate Pain (4 - 6)

## 2021-07-10 NOTE — PROGRESS NOTE ADULT - PROBLEM SELECTOR PROBLEM 1
Osteomyelitis of toe of right foot
R/O Osteomyelitis of toe of right foot

## 2021-07-10 NOTE — PROGRESS NOTE ADULT - ATTENDING COMMENTS
Seen by me this afternoon  Continue with present care as outlined above
Patient seen and examined at bedside. Agree with exam, a/p as above with the following addendum/edits:     Case d/w Dr. Dominguez (ID). Concern for treatment failure of osteomyelitis after initial 6 week IV nafcillin course, pain worsened on Bactrim as well. Patient is adamantly refusing another IV course. Will get MRI for further characterization (expect to show residual/chronic osteo) but if worsening may aid in convincing for another round of IV abx. If refuses, will likely be discharged on oral regimen recommended by ID. If osteo does not heal may ultimately need amputation - appreciate podiatry recs. Decrease lantus given hypoglycemia this AM.
Patient seen and examined at bedside. Agree with exam, a/p as above with the following addendum/edits:     MRI to assess for osteomyelitis but also for possible drainable collection (per Dr. Dominguez discussion w/ outpatient podiatrist). Will keep in house in case drainage is necessary. Plan d/w daughter at bedside. No hypoglycemia on lower lantus dosing.

## 2021-07-10 NOTE — PROGRESS NOTE ADULT - PROBLEM SELECTOR PLAN 6
Current smoker since 14yr old, 4-6 cigarettes a day  No prior hospitalization, no SOB or wheezing  Home meds Symbicort and Ellipta  -continue with home meds

## 2021-07-10 NOTE — PROGRESS NOTE ADULT - PROBLEM SELECTOR PLAN 7
Fluids: none  Electrolytes: replete as necessary, K>4, Mg>2  Nutrition: consistent carb  Bowel Regimen: none  DVT ppx: levenox 40  GI ppx: None  Code: DNR/DNI  Disposition: medical floor

## 2021-07-10 NOTE — PROGRESS NOTE ADULT - PROBLEM SELECTOR PLAN 4
Home med valsartan 80mg  -Hold home meds and continue if hypertensive

## 2021-07-10 NOTE — PROGRESS NOTE ADULT - SUBJECTIVE AND OBJECTIVE BOX
Patient is a 71y old  Female who presents with a chief complaint of Toe pain (10 Jul 2021 11:09)      INTERVAL HPI/ OVERNIGHT EVENTS: INCOMPLETE       LABS    07-09    141  |  104  |  13  ----------------------------<  246<H>  4.1   |  25  |  0.65    Ca    9.0      09 Jul 2021 11:04          ICU Vital Signs Last 24 Hrs  T(C): 36.7 (10 Jul 2021 09:52), Max: 36.8 (09 Jul 2021 22:31)  T(F): 98.1 (10 Jul 2021 09:52), Max: 98.3 (09 Jul 2021 22:31)  HR: 60 (10 Jul 2021 09:52) (60 - 67)  BP: 118/71 (10 Jul 2021 09:52) (111/63 - 118/71)  BP(mean): --  ABP: --  ABP(mean): --  RR: 17 (10 Jul 2021 09:52) (16 - 18)  SpO2: 95% (10 Jul 2021 09:52) (95% - 98%)      RADIOLOGY    MICROBIOLOGY    PHYSICAL EXAM  Lower Extremity Focused  Vasc: DP/PT 2/4 B/L. CFT brisk x 10. TG cool to cool. Mild edema to R hallux.   Derm: Mild erythema to right hallux. Healed stab incision to medial distal tip of right hallux, nylon suture in place.   Neuro: Protective sensation grossly intact b/l  MSK: 5/5 muscle strength in all crural compartments b/l. Ambulates independently w/o assistive device. Patient is a 71y old  Female who presents with a chief complaint of Toe pain (10 Jul 2021 11:09)      INTERVAL HPI/ OVERNIGHT EVENTS: No ON AE. Pt seen and examined at bedside. Pt denies N/V/F/SOB.      LABS    07-09    141  |  104  |  13  ----------------------------<  246<H>  4.1   |  25  |  0.65    Ca    9.0      09 Jul 2021 11:04          ICU Vital Signs Last 24 Hrs  T(C): 36.7 (10 Jul 2021 09:52), Max: 36.8 (09 Jul 2021 22:31)  T(F): 98.1 (10 Jul 2021 09:52), Max: 98.3 (09 Jul 2021 22:31)  HR: 60 (10 Jul 2021 09:52) (60 - 67)  BP: 118/71 (10 Jul 2021 09:52) (111/63 - 118/71)  BP(mean): --  ABP: --  ABP(mean): --  RR: 17 (10 Jul 2021 09:52) (16 - 18)  SpO2: 95% (10 Jul 2021 09:52) (95% - 98%)      RADIOLOGY    MICROBIOLOGY    PHYSICAL EXAM  Lower Extremity Focused  Vasc: DP/PT 2/4 B/L. CFT brisk x 10. TG cool to cool. Mild edema to R hallux.   Derm: Mild erythema to right hallux. Healed stab incision to medial distal tip of right hallux, nylon suture in place.   Neuro: Protective sensation grossly intact b/l  MSK: 5/5 muscle strength in all crural compartments b/l. Ambulates independently w/o assistive device. Normal rate, regular rhythm, normal S1, S2 heart sounds heard.

## 2021-07-10 NOTE — PROGRESS NOTE ADULT - PROBLEM SELECTOR PLAN 3
On Basaglar 30 at home  A1C 3 months ago was 6.7; seems well controlled  -Inpatient ISS   -continue w/ lantus 18 U; has helped control POCT

## 2021-07-10 NOTE — PROGRESS NOTE ADULT - ASSESSMENT
70 y/o F w/ PMHx HTN, DM Type 2, HLD presents today w/ right great toe pain + erythema + edema; has a hx of OM s/p 6 wks of IV Nafcillin (4/21-6/2) sent in by Dr. Dominguez for podiatry evaluation. Podiatry consulted for mgmt of Right hallux cellulitis + osteomyelitis non-responsive to prior treatment. Pt is s/p bedside bone biopsy of Right hallux distal phalanx; stab incision closed w/ nylon suture (7/6). Pathology sample is hard fibrous tissue, no bone was identified    P:   - Continue IV abx per ID recs  - Right hallux dressed with gauze, kerlix, ACE  - F/U R hallux distal phalanx micro - cx NGTD  - WBAT to R foot    Podiatry following 70 y/o F w/ PMHx HTN, DM Type 2, HLD presents today w/ right great toe pain + erythema + edema; has a hx of OM s/p 6 wks of IV Nafcillin (4/21-6/2) sent in by Dr. Dominguez for podiatry evaluation. Podiatry consulted for mgmt of Right hallux cellulitis + osteomyelitis non-responsive to prior treatment. Pt is s/p bedside bone biopsy of Right hallux distal phalanx; stab incision closed w/ nylon suture (7/6). Pathology sample is hard fibrous tissue, no bone was identified    P:   - Continue IV abx per ID recs  - F/U MRI  - Right hallux dressed with gauze, kerlix, ACE  - F/U R hallux distal phalanx micro - cx NGTD  - WBAT to R foot    Podiatry following

## 2021-07-10 NOTE — PROGRESS NOTE ADULT - PROBLEM SELECTOR PLAN 1
Pt was previously treated with 6 weeks for OM of the R toe for MSSA of Nafcillin.   X-ray showed R great toe deterioration consistent with chronic OM with no gas. ESR wnl. Pt did not meet SIRS criteria.   -continue Nafcillin 2g q4hr day 4  -F/u MRI to confirm diagnosis of osteomyelitis

## 2021-07-10 NOTE — PROGRESS NOTE ADULT - PROBLEM SELECTOR PLAN 5
Home med atorvastatin 40mg  -Continue with home meds

## 2021-07-11 ENCOUNTER — TRANSCRIPTION ENCOUNTER (OUTPATIENT)
Age: 71
End: 2021-07-11

## 2021-07-11 VITALS
TEMPERATURE: 98 F | DIASTOLIC BLOOD PRESSURE: 76 MMHG | OXYGEN SATURATION: 95 % | SYSTOLIC BLOOD PRESSURE: 125 MMHG | HEART RATE: 71 BPM | RESPIRATION RATE: 18 BRPM

## 2021-07-11 LAB
CULTURE RESULTS: SIGNIFICANT CHANGE UP
CULTURE RESULTS: SIGNIFICANT CHANGE UP
GLUCOSE BLDC GLUCOMTR-MCNC: 108 MG/DL — HIGH (ref 70–99)
GLUCOSE BLDC GLUCOMTR-MCNC: 126 MG/DL — HIGH (ref 70–99)
SPECIMEN SOURCE: SIGNIFICANT CHANGE UP
SPECIMEN SOURCE: SIGNIFICANT CHANGE UP

## 2021-07-11 PROCEDURE — 73660 X-RAY EXAM OF TOE(S): CPT

## 2021-07-11 PROCEDURE — 36415 COLL VENOUS BLD VENIPUNCTURE: CPT

## 2021-07-11 PROCEDURE — 80048 BASIC METABOLIC PNL TOTAL CA: CPT

## 2021-07-11 PROCEDURE — 86769 SARS-COV-2 COVID-19 ANTIBODY: CPT

## 2021-07-11 PROCEDURE — 85025 COMPLETE CBC W/AUTO DIFF WBC: CPT

## 2021-07-11 PROCEDURE — 87075 CULTR BACTERIA EXCEPT BLOOD: CPT

## 2021-07-11 PROCEDURE — 97161 PT EVAL LOW COMPLEX 20 MIN: CPT

## 2021-07-11 PROCEDURE — 97116 GAIT TRAINING THERAPY: CPT

## 2021-07-11 PROCEDURE — 82962 GLUCOSE BLOOD TEST: CPT

## 2021-07-11 PROCEDURE — 73720 MRI LWR EXTREMITY W/O&W/DYE: CPT

## 2021-07-11 PROCEDURE — 99232 SBSQ HOSP IP/OBS MODERATE 35: CPT

## 2021-07-11 PROCEDURE — 87040 BLOOD CULTURE FOR BACTERIA: CPT

## 2021-07-11 PROCEDURE — 83036 HEMOGLOBIN GLYCOSYLATED A1C: CPT

## 2021-07-11 PROCEDURE — 80053 COMPREHEN METABOLIC PANEL: CPT

## 2021-07-11 PROCEDURE — 94640 AIRWAY INHALATION TREATMENT: CPT

## 2021-07-11 PROCEDURE — 99239 HOSP IP/OBS DSCHRG MGMT >30: CPT

## 2021-07-11 PROCEDURE — 87070 CULTURE OTHR SPECIMN AEROBIC: CPT

## 2021-07-11 PROCEDURE — 88311 DECALCIFY TISSUE: CPT

## 2021-07-11 PROCEDURE — 83735 ASSAY OF MAGNESIUM: CPT

## 2021-07-11 PROCEDURE — 99285 EMERGENCY DEPT VISIT HI MDM: CPT | Mod: 25

## 2021-07-11 PROCEDURE — 88305 TISSUE EXAM BY PATHOLOGIST: CPT

## 2021-07-11 PROCEDURE — 85652 RBC SED RATE AUTOMATED: CPT

## 2021-07-11 PROCEDURE — A9585: CPT

## 2021-07-11 PROCEDURE — 86140 C-REACTIVE PROTEIN: CPT

## 2021-07-11 PROCEDURE — 87635 SARS-COV-2 COVID-19 AMP PRB: CPT

## 2021-07-11 RX ADMIN — NAFCILLIN 200 GRAM(S): 10 INJECTION, POWDER, FOR SOLUTION INTRAVENOUS at 13:06

## 2021-07-11 RX ADMIN — BUDESONIDE AND FORMOTEROL FUMARATE DIHYDRATE 2 PUFF(S): 160; 4.5 AEROSOL RESPIRATORY (INHALATION) at 05:23

## 2021-07-11 RX ADMIN — ENOXAPARIN SODIUM 40 MILLIGRAM(S): 100 INJECTION SUBCUTANEOUS at 13:06

## 2021-07-11 RX ADMIN — NAFCILLIN 200 GRAM(S): 10 INJECTION, POWDER, FOR SOLUTION INTRAVENOUS at 00:42

## 2021-07-11 RX ADMIN — NAFCILLIN 200 GRAM(S): 10 INJECTION, POWDER, FOR SOLUTION INTRAVENOUS at 05:23

## 2021-07-11 RX ADMIN — NAFCILLIN 200 GRAM(S): 10 INJECTION, POWDER, FOR SOLUTION INTRAVENOUS at 09:23

## 2021-07-11 NOTE — PROGRESS NOTE ADULT - SUBJECTIVE AND OBJECTIVE BOX
INTERVAL HPI/OVERNIGHT EVENTS: KIRAN. Happy to be going home. Daughter with several questions regarding osteomyelitis diagnosis and treatment plan--all answered.    CONSTITUTIONAL:  Negative fever or chills, feels well, good appetite  EYES:  Negative  blurry vision or double vision  CARDIOVASCULAR:  Negative for chest pain or palpitations  RESPIRATORY:  Negative for cough, wheezing, or SOB   GASTROINTESTINAL:  Negative for nausea, vomiting, diarrhea, constipation, or abdominal pain  GENITOURINARY:  Negative frequency, urgency or dysuria  NEUROLOGIC:  No headache, confusion, dizziness, lightheadedness      ANTIBIOTICS/RELEVANT:    MEDICATIONS  (STANDING):  atorvastatin 40 milliGRAM(s) Oral at bedtime  budesonide 160 MICROgram(s)/formoterol 4.5 MICROgram(s) Inhaler 2 Puff(s) Inhalation two times a day  dextrose 40% Gel 15 Gram(s) Oral once  dextrose 5%. 1000 milliLiter(s) (50 mL/Hr) IV Continuous <Continuous>  dextrose 5%. 1000 milliLiter(s) (100 mL/Hr) IV Continuous <Continuous>  dextrose 50% Injectable 25 Gram(s) IV Push once  dextrose 50% Injectable 12.5 Gram(s) IV Push once  dextrose 50% Injectable 25 Gram(s) IV Push once  enoxaparin Injectable 40 milliGRAM(s) SubCutaneous every 24 hours  glucagon  Injectable 1 milliGRAM(s) IntraMuscular once  insulin glargine Injectable (LANTUS) 18 Unit(s) SubCutaneous at bedtime  insulin lispro (ADMELOG) corrective regimen sliding scale   SubCutaneous Before meals and at bedtime  nafcillin  IVPB 2 Gram(s) IV Intermittent every 4 hours  tiotropium 18 MICROgram(s) Capsule 1 Capsule(s) Inhalation at bedtime    MEDICATIONS  (PRN):  acetaminophen   Tablet .. 650 milliGRAM(s) Oral every 6 hours PRN Mild Pain (1 - 3), Moderate Pain (4 - 6)        Vital Signs Last 24 Hrs  T(C): 36.7 (11 Jul 2021 08:51), Max: 36.7 (10 Jul 2021 21:24)  T(F): 98 (11 Jul 2021 08:51), Max: 98 (10 Jul 2021 21:24)  HR: 58 (11 Jul 2021 08:51) (58 - 69)  BP: 115/69 (11 Jul 2021 08:51) (109/68 - 120/66)  BP(mean): --  RR: 18 (11 Jul 2021 08:51) (16 - 20)  SpO2: 96% (11 Jul 2021 08:51) (95% - 97%)    PHYSICAL EXAM:  Constitutional:Well-developed, well nourished  Eyes:SARAN, EOMI  Ear/Nose/Throat: no oral lesion, no sinus tenderness on percussion	  Neck:no JVD, no lymphadenopathy, supple  Respiratory: CTA mihir  Cardiovascular: S1S2 RRR, no murmurs  Gastrointestinal:soft, (+) BS, no HSM  Extremities:no e/e/c  Vascular: DP Pulse:	right normal; left normal      LABS:                MICROBIOLOGY: reviewed    RADIOLOGY & ADDITIONAL STUDIES: < from: MR Foot w/wo IV Cont, Right (07.10.21 @ 18:37) >    INTERPRETATION:    Bones: There is STIR hyperintense/T1 hypointenseconfluent signal abnormality in the first digit distal phalanx. There is patchy enhancement on postcontrast imaging without evidence of hypoenhancement which was seen on the previous MRI; the findings suggest the prior intraosseous abscess has resolved. There are nonspecific second and third small MTP joint effusions.    Soft Tissues: No localized soft tissue abscess.    IMPRESSION:  Osteomyelitis of the first digit distal phalanx with bony destruction of the tuft.    Prior intraosseous abscess has resolved compared to previous MRI. No soft tissue abscess.    < end of copied text >

## 2021-07-11 NOTE — PROGRESS NOTE ADULT - NSICDXPILOT_GEN_ALL_CORE
[place]

## 2021-07-11 NOTE — PROGRESS NOTE ADULT - ASSESSMENT
70 y/o F w/ PMHx HTN, DM Type 2, HLD presents today w/ right great toe pain + erythema + edema; has a hx of OM s/p 6 wks of IV Nafcillin (4/21-6/2) sent in by Dr. Dominguez for podiatry evaluation. Podiatry consulted for mgmt of Right hallux cellulitis + osteomyelitis non-responsive to prior treatment. Pt is s/p bedside bone biopsy of Right hallux distal phalanx; stab incision closed w/ nylon suture (7/6). Pathology sample is hard fibrous tissue, no bone was identified. R hallux distal phalanx CX NGTD- final. Of note, MRI positive for OM of 1st distal phalanx with bony destruction of tuft. Negative for abscess.     P:   - Continue IV abx per ID recs  - Right hallux dressed with gauze, kerlix, ACE  - WBAT to R foot    Podiatry following   70 y/o F w/ PMHx HTN, DM Type 2, HLD presents today w/ right great toe pain + erythema + edema; has a hx of OM s/p 6 wks of IV Nafcillin (4/21-6/2) sent in by Dr. Dominguez for podiatry evaluation. Podiatry consulted for mgmt of Right hallux cellulitis + osteomyelitis non-responsive to prior treatment. Pt is s/p bedside bone biopsy of Right hallux distal phalanx; stab incision closed w/ nylon suture (7/6). Pathology sample is hard fibrous tissue, no bone was identified. R hallux distal phalanx CX NGTD- final. Of note, MRI positive for OM of 1st distal phalanx with bony destruction of tuft. Negative for abscess.     P:   - Continue IV abx per ID recs  - Right hallux dressed with gauze, kerlix, ACE  - WBAT to R foot  - Dispensed sx shoe    Podiatry following

## 2021-07-11 NOTE — DISCHARGE NOTE NURSING/CASE MANAGEMENT/SOCIAL WORK - NSDCFUADDAPPT_GEN_ALL_CORE_FT
Dr. Dominguez's office will reach out to you upon discharge to schedule a follow up appointment.     Dr. Stanley's office is closed at the time of your discharge, therefore we were unable to schedule you the appointment. Please make sure you make an appointment with your Podiatrist, Dr. Stanley, on 7/20/2021. He will continue to monitor your foot infection and remove your stitches at that time.

## 2021-07-11 NOTE — PROGRESS NOTE ADULT - REASON FOR ADMISSION
Toe pain

## 2021-07-11 NOTE — PROGRESS NOTE ADULT - SUBJECTIVE AND OBJECTIVE BOX
Patient is a 71y old  Female who presents with a chief complaint of Toe pain (10 Jul 2021 11:43)      INTERVAL HPI/ OVERNIGHT EVENTS: INCOMPLETE       LABS              ICU Vital Signs Last 24 Hrs  T(C): 36.7 (11 Jul 2021 08:51), Max: 36.7 (10 Jul 2021 21:24)  T(F): 98 (11 Jul 2021 08:51), Max: 98 (10 Jul 2021 21:24)  HR: 58 (11 Jul 2021 08:51) (58 - 69)  BP: 115/69 (11 Jul 2021 08:51) (109/68 - 120/66)  BP(mean): --  ABP: --  ABP(mean): --  RR: 18 (11 Jul 2021 08:51) (16 - 20)  SpO2: 96% (11 Jul 2021 08:51) (95% - 97%)      RADIOLOGY    MICROBIOLOGY    PHYSICAL EXAM  Lower Extremity Focused  Vasc: DP/PT 2/4 B/L. CFT brisk x 10. TG cool to cool. Mild edema to R hallux.   Derm: Mild erythema to right hallux. Healed stab incision to medial distal tip of right hallux, nylon suture in place.   Neuro: Protective sensation grossly intact b/l  MSK: 5/5 muscle strength in all crural compartments b/l. Ambulates independently w/o assistive device. Patient is a 71y old  Female who presents with a chief complaint of Toe pain (10 Jul 2021 11:43)      INTERVAL HPI/ OVERNIGHT EVENTS: KIRAN ON. Pt seen and examined at bedside. Pt denies N/V/F/SOB.      LABS              ICU Vital Signs Last 24 Hrs  T(C): 36.7 (11 Jul 2021 08:51), Max: 36.7 (10 Jul 2021 21:24)  T(F): 98 (11 Jul 2021 08:51), Max: 98 (10 Jul 2021 21:24)  HR: 58 (11 Jul 2021 08:51) (58 - 69)  BP: 115/69 (11 Jul 2021 08:51) (109/68 - 120/66)  BP(mean): --  ABP: --  ABP(mean): --  RR: 18 (11 Jul 2021 08:51) (16 - 20)  SpO2: 96% (11 Jul 2021 08:51) (95% - 97%)      RADIOLOGY    MICROBIOLOGY    PHYSICAL EXAM  Lower Extremity Focused  Vasc: DP/PT 2/4 B/L. CFT brisk x 10. TG cool to cool. Mild edema to R hallux.   Derm: Mild erythema to right hallux. Healed stab incision to medial distal tip of right hallux, nylon suture in place.   Neuro: Protective sensation grossly intact b/l  MSK: 5/5 muscle strength in all crural compartments b/l. Ambulates independently w/o assistive device.

## 2021-07-11 NOTE — PROGRESS NOTE ADULT - PROVIDER SPECIALTY LIST ADULT
Internal Medicine
Podiatry
Podiatry
Infectious Disease
Infectious Disease
Podiatry
Podiatry
Infectious Disease
Infectious Disease
Podiatry
Internal Medicine

## 2021-07-11 NOTE — DISCHARGE NOTE NURSING/CASE MANAGEMENT/SOCIAL WORK - PATIENT PORTAL LINK FT
You can access the FollowMyHealth Patient Portal offered by [facility] by registering at the following website: [email or web address]. By joining [facility]’s FollowMyHealth portal, you will also be able to view your health information using other applications (apps) compatible with our system.

## 2021-07-11 NOTE — PROGRESS NOTE ADULT - ASSESSMENT
DM, recurrent R hallux osteomyelitis; MR without soft tissue abscess. Can transition from nafcillin to cephalexin 500mg PO [id number] 6 week course through 8/16/21.  Will arrange post hospital f/u with Dr. Dominguez in 2 weeks.   Discussed with primary team.  Please reconsult with ?

## 2021-07-12 RX ORDER — CEPHALEXIN 500 MG
1 CAPSULE ORAL
Qty: 144 | Refills: 0
Start: 2021-07-12 | End: 2021-08-16

## 2021-07-14 PROBLEM — E11.9 TYPE 2 DIABETES MELLITUS WITHOUT COMPLICATIONS: Chronic | Status: ACTIVE | Noted: 2019-05-24

## 2021-07-14 PROBLEM — J45.909 UNSPECIFIED ASTHMA, UNCOMPLICATED: Chronic | Status: ACTIVE | Noted: 2019-05-24

## 2021-07-16 ENCOUNTER — APPOINTMENT (OUTPATIENT)
Dept: MRI IMAGING | Facility: HOSPITAL | Age: 71
End: 2021-07-16

## 2021-07-21 DIAGNOSIS — Z66 DO NOT RESUSCITATE: ICD-10-CM

## 2021-07-21 DIAGNOSIS — I10 ESSENTIAL (PRIMARY) HYPERTENSION: ICD-10-CM

## 2021-07-21 DIAGNOSIS — J45.909 UNSPECIFIED ASTHMA, UNCOMPLICATED: ICD-10-CM

## 2021-07-21 DIAGNOSIS — E78.5 HYPERLIPIDEMIA, UNSPECIFIED: ICD-10-CM

## 2021-07-21 DIAGNOSIS — B95.61 METHICILLIN SUSCEPTIBLE STAPHYLOCOCCUS AUREUS INFECTION AS THE CAUSE OF DISEASES CLASSIFIED ELSEWHERE: ICD-10-CM

## 2021-07-21 DIAGNOSIS — E11.69 TYPE 2 DIABETES MELLITUS WITH OTHER SPECIFIED COMPLICATION: ICD-10-CM

## 2021-07-21 DIAGNOSIS — L03.90 CELLULITIS, UNSPECIFIED: ICD-10-CM

## 2021-07-21 DIAGNOSIS — M86.9 OSTEOMYELITIS, UNSPECIFIED: ICD-10-CM

## 2021-07-21 DIAGNOSIS — J44.9 CHRONIC OBSTRUCTIVE PULMONARY DISEASE, UNSPECIFIED: ICD-10-CM

## 2021-07-21 DIAGNOSIS — F17.210 NICOTINE DEPENDENCE, CIGARETTES, UNCOMPLICATED: ICD-10-CM

## 2021-07-27 ENCOUNTER — APPOINTMENT (OUTPATIENT)
Dept: INFECTIOUS DISEASE | Facility: CLINIC | Age: 71
End: 2021-07-27
Payer: MEDICAID

## 2021-07-27 VITALS
HEART RATE: 76 BPM | HEIGHT: 60 IN | BODY MASS INDEX: 26.5 KG/M2 | OXYGEN SATURATION: 97 % | SYSTOLIC BLOOD PRESSURE: 139 MMHG | TEMPERATURE: 97.9 F | WEIGHT: 135 LBS | DIASTOLIC BLOOD PRESSURE: 79 MMHG

## 2021-07-27 PROCEDURE — 99214 OFFICE O/P EST MOD 30 MIN: CPT | Mod: 25

## 2021-07-27 RX ORDER — BIMATOPROST 0.1 MG/ML
0.01 SOLUTION/ DROPS OPHTHALMIC
Refills: 0 | Status: COMPLETED | COMMUNITY
End: 2021-07-27

## 2021-07-27 NOTE — HISTORY OF PRESENT ILLNESS
[FreeTextEntry1] : 70 year old female with HTN, DM2, HLD, COPD, back surgery X 5 admitted 4/17-4/21 with OM R hallux. She had worsening pain following ingrown nail removal, found to have OM R hallux. Bone biopsy was done 4/19. Surgical swab grew Staph aureus and Staph lugdunensis. She was treated with nafcillin 2 g IV q 4 h x 6 weeks (4/21 - 6/2).  On 6/25, she developed worsening pain and swelling  She was started on Bactrim x 2 weeks.  Symptoms initially improved before pain worsened.  She was referred to ED on 6/7 and nafcillin was restarted. Biopsy (done on TMP/SX) without bone, culture NGTD. MR without soft tissue abscess, findings consistent with chronic OM. She was discharged on 7/11 on cephalexin 500 mg PO q 6 h with plan for a prolonged course. She has significant pain in R toe (7/10) however it is improving since admission. She had f/u with podiatry on 7/21, next f/u is 8/5.

## 2021-07-27 NOTE — ASSESSMENT
[FreeTextEntry1] : 71 year old fmeale with HTN, DM, HLD here for f/u of chronic OM R hallux. Initial culture grew MSSA and Staph lugdunensis.  Repeat biopsy (done on TMP/SX) without bone, culture NGTD. She improved on nafcillin while inpatient.  Now on prolonged course of cephalexin.  \par Plan:\par - Continue cephalexin 500 mg PO q 6 h\par - CBC, CMP, ESR, CRP drawn and sent from office\par - F/u with podiatry as planned\par Will contact patient with above results. Follow up in 1 month.

## 2021-07-27 NOTE — REVIEW OF SYSTEMS
[Nasal Discharge] : nasal discharge [Sputum] : coughing up ~M sputum [As Noted in HPI] : as noted in HPI [Fever] : no fever [Chills] : no chills [Eye Pain] : no eye pain [Eyesight Problems] : no eyesight problems [Chest Pain] : no chest pain [Shortness Of Breath] : no shortness of breath [Abdominal Pain] : no abdominal pain [Vomiting] : no vomiting [Diarrhea] : no diarrhea [Dysuria] : no dysuria [Skin Lesions] : no skin lesions [FreeTextEntry3] : Occ photophobia [de-identified] : Occ headaches in the morning

## 2021-07-27 NOTE — PHYSICAL EXAM
[General Appearance - Alert] : alert [General Appearance - Well-Appearing] : healthy appearing [Sclera] : the sclera and conjunctiva were normal [Outer Ear] : the ears and nose were normal in appearance [Examination Of The Oral Cavity] : the lips and gums were normal [Oropharynx] : the oropharynx was normal with no thrush [Auscultation Breath Sounds / Voice Sounds] : lungs were clear to auscultation bilaterally [Heart Rate And Rhythm] : heart rate was normal and rhythm regular [Heart Sounds] : normal S1 and S2 [Murmurs] : no murmurs [Edema] : there was no peripheral edema [Bowel Sounds] : normal bowel sounds [Abdomen Soft] : soft [Abdomen Tenderness] : non-tender [Costovertebral Angle Tenderness] : no CVA tenderness [] : no rash [FreeTextEntry1] : R hallux distal phalynx tender, no erythema or warmth.  Skin closed.  Nail avulsed.

## 2021-07-28 ENCOUNTER — NON-APPOINTMENT (OUTPATIENT)
Age: 71
End: 2021-07-28

## 2021-07-28 LAB
ALBUMIN SERPL ELPH-MCNC: 4.5 G/DL
ALP BLD-CCNC: 82 U/L
ALT SERPL-CCNC: 22 U/L
ANION GAP SERPL CALC-SCNC: 11 MMOL/L
AST SERPL-CCNC: 17 U/L
BASOPHILS # BLD AUTO: 0.04 K/UL
BASOPHILS NFR BLD AUTO: 0.5 %
BILIRUB SERPL-MCNC: 0.5 MG/DL
BUN SERPL-MCNC: 14 MG/DL
CALCIUM SERPL-MCNC: 9.5 MG/DL
CHLORIDE SERPL-SCNC: 107 MMOL/L
CO2 SERPL-SCNC: 24 MMOL/L
CREAT SERPL-MCNC: 0.62 MG/DL
CRP SERPL-MCNC: <3 MG/L
EOSINOPHIL # BLD AUTO: 0.1 K/UL
EOSINOPHIL NFR BLD AUTO: 1.3 %
ERYTHROCYTE [SEDIMENTATION RATE] IN BLOOD BY WESTERGREN METHOD: 5 MM/HR
GLUCOSE SERPL-MCNC: 110 MG/DL
HCT VFR BLD CALC: 36.4 %
HGB BLD-MCNC: 11.9 G/DL
IMM GRANULOCYTES NFR BLD AUTO: 0.3 %
LYMPHOCYTES # BLD AUTO: 1.1 K/UL
LYMPHOCYTES NFR BLD AUTO: 14.4 %
MAN DIFF?: NORMAL
MCHC RBC-ENTMCNC: 31.7 PG
MCHC RBC-ENTMCNC: 32.7 GM/DL
MCV RBC AUTO: 97.1 FL
MONOCYTES # BLD AUTO: 0.46 K/UL
MONOCYTES NFR BLD AUTO: 6 %
NEUTROPHILS # BLD AUTO: 5.91 K/UL
NEUTROPHILS NFR BLD AUTO: 77.5 %
PLATELET # BLD AUTO: 216 K/UL
POTASSIUM SERPL-SCNC: 4.4 MMOL/L
PROT SERPL-MCNC: 6.7 G/DL
RBC # BLD: 3.75 M/UL
RBC # FLD: 13.2 %
SODIUM SERPL-SCNC: 142 MMOL/L
WBC # FLD AUTO: 7.63 K/UL

## 2021-09-03 ENCOUNTER — APPOINTMENT (OUTPATIENT)
Dept: INFECTIOUS DISEASE | Facility: CLINIC | Age: 71
End: 2021-09-03
Payer: MEDICARE

## 2021-09-03 VITALS
BODY MASS INDEX: 25.82 KG/M2 | SYSTOLIC BLOOD PRESSURE: 152 MMHG | OXYGEN SATURATION: 98 % | HEIGHT: 60 IN | WEIGHT: 131.5 LBS | DIASTOLIC BLOOD PRESSURE: 81 MMHG | TEMPERATURE: 97.6 F | HEART RATE: 66 BPM

## 2021-09-03 PROCEDURE — 99214 OFFICE O/P EST MOD 30 MIN: CPT | Mod: 25

## 2021-09-03 NOTE — ASSESSMENT
[FreeTextEntry1] : 71 year old female with HTN, DM, HLD here for f/u of chronic OM R hallux. Initial culture grew MSSA and Staph lugdunensis.  Repeat biopsy (done on TMP/SX) without bone, culture NGTD. She improved on nafcillin while inpatient. Symptoms worsened since stopping cephalexin. \par Plan:\par - Restart cephalexin 500 mg PO q 6 h\par - CBC, CMP, ESR, CRP drawn and sent from office\par - F/u with podiatry as planned\par Will contact patient with above results. Follow up in 1 month.

## 2021-09-03 NOTE — REVIEW OF SYSTEMS
[Nasal Discharge] : nasal discharge [Cough] : cough [As Noted in HPI] : as noted in HPI [Fever] : no fever [Chills] : no chills [Eye Pain] : no eye pain [Eyesight Problems] : no eyesight problems [Sore Throat] : no sore throat [Chest Pain] : no chest pain [Shortness Of Breath] : no shortness of breath [Sputum] : not coughing up ~M sputum [Abdominal Pain] : no abdominal pain [Diarrhea] : no diarrhea [Dysuria] : no dysuria [Skin Lesions] : no skin lesions

## 2021-09-03 NOTE — HISTORY OF PRESENT ILLNESS
----- Message from Amaya Wright sent at 2/4/2019  1:07 PM EST -----  Regarding: Dr. Anette Villa telephone   Contact: [phone]  Pt is requesting for most recent labs to be sent to Dr. Linda Prado at [phone] as well as a new Rx to be sent to the [address] [place] [address] at [phone] due to the current the Rx Shaune Needles has been discontinued. [FreeTextEntry1] : 71 year old female with HTN, DM type 2, HLD, COPD, back surgery x 5 was admitted 4/17-4/21 with OM R hallux due to Staph aureus and Staph lugdunensis. She was treated with nafcillin x 6 weeks (4/21-6/2). After stopping antibiotics, she had recurrence of symptoms. MRI showed chronic OM. She took cephalexin 500 mg PO q 6 h  (7/11-8/15). She ran out of antibiotics and has been off for 2 weeks. Plan was for prolonged course of cephalexin. Since stopping antibiotics she's had worsening toe pain. Pain is sharp and radiates up leg. She has f/u with podiatry on 9/8.

## 2021-09-06 LAB
ALBUMIN SERPL ELPH-MCNC: 4.7 G/DL
ALP BLD-CCNC: 77 U/L
ALT SERPL-CCNC: 15 U/L
ANION GAP SERPL CALC-SCNC: 12 MMOL/L
AST SERPL-CCNC: 18 U/L
BASOPHILS # BLD AUTO: 0.04 K/UL
BASOPHILS NFR BLD AUTO: 0.6 %
BILIRUB SERPL-MCNC: 0.7 MG/DL
BUN SERPL-MCNC: 17 MG/DL
CALCIUM SERPL-MCNC: 9.9 MG/DL
CHLORIDE SERPL-SCNC: 106 MMOL/L
CO2 SERPL-SCNC: 25 MMOL/L
CREAT SERPL-MCNC: 0.67 MG/DL
CRP SERPL-MCNC: <3 MG/L
EOSINOPHIL # BLD AUTO: 0.09 K/UL
EOSINOPHIL NFR BLD AUTO: 1.2 %
ERYTHROCYTE [SEDIMENTATION RATE] IN BLOOD BY WESTERGREN METHOD: 5 MM/HR
GLUCOSE SERPL-MCNC: 109 MG/DL
HCT VFR BLD CALC: 40.6 %
HGB BLD-MCNC: 13.4 G/DL
IMM GRANULOCYTES NFR BLD AUTO: 0.3 %
LYMPHOCYTES # BLD AUTO: 1.44 K/UL
LYMPHOCYTES NFR BLD AUTO: 19.9 %
MAN DIFF?: NORMAL
MCHC RBC-ENTMCNC: 31.2 PG
MCHC RBC-ENTMCNC: 33 GM/DL
MCV RBC AUTO: 94.6 FL
MONOCYTES # BLD AUTO: 0.39 K/UL
MONOCYTES NFR BLD AUTO: 5.4 %
NEUTROPHILS # BLD AUTO: 5.25 K/UL
NEUTROPHILS NFR BLD AUTO: 72.6 %
PLATELET # BLD AUTO: 213 K/UL
POTASSIUM SERPL-SCNC: 4.3 MMOL/L
PROT SERPL-MCNC: 6.8 G/DL
RBC # BLD: 4.29 M/UL
RBC # FLD: 12.1 %
SODIUM SERPL-SCNC: 143 MMOL/L
WBC # FLD AUTO: 7.23 K/UL

## 2021-09-07 ENCOUNTER — NON-APPOINTMENT (OUTPATIENT)
Age: 71
End: 2021-09-07

## 2021-10-15 ENCOUNTER — APPOINTMENT (OUTPATIENT)
Dept: INFECTIOUS DISEASE | Facility: CLINIC | Age: 71
End: 2021-10-15

## 2021-10-28 ENCOUNTER — EMERGENCY (EMERGENCY)
Facility: HOSPITAL | Age: 71
LOS: 1 days | Discharge: HOME | End: 2021-10-28
Attending: EMERGENCY MEDICINE | Admitting: EMERGENCY MEDICINE
Payer: MEDICARE

## 2021-10-28 VITALS
HEART RATE: 68 BPM | SYSTOLIC BLOOD PRESSURE: 163 MMHG | RESPIRATION RATE: 17 BRPM | WEIGHT: 138.01 LBS | HEIGHT: 60 IN | DIASTOLIC BLOOD PRESSURE: 91 MMHG | OXYGEN SATURATION: 98 % | TEMPERATURE: 98 F

## 2021-10-28 DIAGNOSIS — Z98.890 OTHER SPECIFIED POSTPROCEDURAL STATES: Chronic | ICD-10-CM

## 2021-10-28 DIAGNOSIS — R42 DIZZINESS AND GIDDINESS: ICD-10-CM

## 2021-10-28 DIAGNOSIS — R07.89 OTHER CHEST PAIN: ICD-10-CM

## 2021-10-28 LAB
ALBUMIN SERPL ELPH-MCNC: 4.6 G/DL — SIGNIFICANT CHANGE UP (ref 3.3–5)
ALP SERPL-CCNC: 96 U/L — SIGNIFICANT CHANGE UP (ref 40–120)
ALT FLD-CCNC: 19 U/L — SIGNIFICANT CHANGE UP (ref 10–45)
ANION GAP SERPL CALC-SCNC: 10 MMOL/L — SIGNIFICANT CHANGE UP (ref 5–17)
AST SERPL-CCNC: 21 U/L — SIGNIFICANT CHANGE UP (ref 10–40)
BASOPHILS # BLD AUTO: 0.06 K/UL — SIGNIFICANT CHANGE UP (ref 0–0.2)
BASOPHILS NFR BLD AUTO: 0.5 % — SIGNIFICANT CHANGE UP (ref 0–2)
BILIRUB SERPL-MCNC: 0.5 MG/DL — SIGNIFICANT CHANGE UP (ref 0.2–1.2)
BUN SERPL-MCNC: 15 MG/DL — SIGNIFICANT CHANGE UP (ref 7–23)
CALCIUM SERPL-MCNC: 9.7 MG/DL — SIGNIFICANT CHANGE UP (ref 8.4–10.5)
CHLORIDE SERPL-SCNC: 105 MMOL/L — SIGNIFICANT CHANGE UP (ref 96–108)
CK MB CFR SERPL CALC: 2.4 NG/ML — SIGNIFICANT CHANGE UP (ref 0–6.7)
CK SERPL-CCNC: 101 U/L — SIGNIFICANT CHANGE UP (ref 25–170)
CO2 SERPL-SCNC: 30 MMOL/L — SIGNIFICANT CHANGE UP (ref 22–31)
CREAT SERPL-MCNC: 0.77 MG/DL — SIGNIFICANT CHANGE UP (ref 0.5–1.3)
EOSINOPHIL # BLD AUTO: 0.11 K/UL — SIGNIFICANT CHANGE UP (ref 0–0.5)
EOSINOPHIL NFR BLD AUTO: 1 % — SIGNIFICANT CHANGE UP (ref 0–6)
GLUCOSE SERPL-MCNC: 135 MG/DL — HIGH (ref 70–99)
HCT VFR BLD CALC: 38.3 % — SIGNIFICANT CHANGE UP (ref 34.5–45)
HGB BLD-MCNC: 13.1 G/DL — SIGNIFICANT CHANGE UP (ref 11.5–15.5)
IMM GRANULOCYTES NFR BLD AUTO: 0.5 % — SIGNIFICANT CHANGE UP (ref 0–1.5)
LIDOCAIN IGE QN: 17 U/L — SIGNIFICANT CHANGE UP (ref 7–60)
LYMPHOCYTES # BLD AUTO: 1.5 K/UL — SIGNIFICANT CHANGE UP (ref 1–3.3)
LYMPHOCYTES # BLD AUTO: 13.7 % — SIGNIFICANT CHANGE UP (ref 13–44)
MCHC RBC-ENTMCNC: 31.4 PG — SIGNIFICANT CHANGE UP (ref 27–34)
MCHC RBC-ENTMCNC: 34.2 GM/DL — SIGNIFICANT CHANGE UP (ref 32–36)
MCV RBC AUTO: 91.8 FL — SIGNIFICANT CHANGE UP (ref 80–100)
MONOCYTES # BLD AUTO: 0.58 K/UL — SIGNIFICANT CHANGE UP (ref 0–0.9)
MONOCYTES NFR BLD AUTO: 5.3 % — SIGNIFICANT CHANGE UP (ref 2–14)
NEUTROPHILS # BLD AUTO: 8.63 K/UL — HIGH (ref 1.8–7.4)
NEUTROPHILS NFR BLD AUTO: 79 % — HIGH (ref 43–77)
NRBC # BLD: 0 /100 WBCS — SIGNIFICANT CHANGE UP (ref 0–0)
PLATELET # BLD AUTO: 224 K/UL — SIGNIFICANT CHANGE UP (ref 150–400)
POTASSIUM SERPL-MCNC: 4.3 MMOL/L — SIGNIFICANT CHANGE UP (ref 3.5–5.3)
POTASSIUM SERPL-SCNC: 4.3 MMOL/L — SIGNIFICANT CHANGE UP (ref 3.5–5.3)
PROT SERPL-MCNC: 7.6 G/DL — SIGNIFICANT CHANGE UP (ref 6–8.3)
RBC # BLD: 4.17 M/UL — SIGNIFICANT CHANGE UP (ref 3.8–5.2)
RBC # FLD: 11.9 % — SIGNIFICANT CHANGE UP (ref 10.3–14.5)
SODIUM SERPL-SCNC: 145 MMOL/L — SIGNIFICANT CHANGE UP (ref 135–145)
TROPONIN T SERPL-MCNC: 0.01 NG/ML — SIGNIFICANT CHANGE UP (ref 0–0.01)
WBC # BLD: 10.94 K/UL — HIGH (ref 3.8–10.5)
WBC # FLD AUTO: 10.94 K/UL — HIGH (ref 3.8–10.5)

## 2021-10-28 PROCEDURE — 71045 X-RAY EXAM CHEST 1 VIEW: CPT | Mod: 26

## 2021-10-28 PROCEDURE — 99285 EMERGENCY DEPT VISIT HI MDM: CPT

## 2021-10-28 PROCEDURE — 93010 ELECTROCARDIOGRAM REPORT: CPT

## 2021-10-28 RX ORDER — MECLIZINE HCL 12.5 MG
25 TABLET ORAL ONCE
Refills: 0 | Status: COMPLETED | OUTPATIENT
Start: 2021-10-28 | End: 2021-10-28

## 2021-10-28 RX ORDER — ACETAMINOPHEN 500 MG
1000 TABLET ORAL ONCE
Refills: 0 | Status: COMPLETED | OUTPATIENT
Start: 2021-10-28 | End: 2021-10-28

## 2021-10-28 RX ORDER — FAMOTIDINE 10 MG/ML
20 INJECTION INTRAVENOUS ONCE
Refills: 0 | Status: COMPLETED | OUTPATIENT
Start: 2021-10-28 | End: 2021-10-28

## 2021-10-28 RX ADMIN — Medication 25 MILLIGRAM(S): at 21:33

## 2021-10-28 RX ADMIN — Medication 400 MILLIGRAM(S): at 21:33

## 2021-10-28 RX ADMIN — FAMOTIDINE 20 MILLIGRAM(S): 10 INJECTION INTRAVENOUS at 21:19

## 2021-10-28 NOTE — ED ADULT NURSE NOTE - OBJECTIVE STATEMENT
71y Female from home c/o chest pain, with associated nausea and vomiting. No blood in Emeses. Pt endorses dizziness with change in position. HX of Gerd, and vertigo. Denies sob, back pain, fever, chills, night sweats, edema, LOC, nor cough.

## 2021-10-28 NOTE — ED ADULT NURSE NOTE - NSFALLRSKASSESASSIST_ED_ALL_ED
Niall Sow II has been seen by Hoang GEE at [facility]  [facility]  Aria Moscoso  is a 5  y o  5  m o  male here for follow up  He has an autism spectrum disorder and developmental disability including receptive and expressive language deficits, cognitive communication deficits, fine motor delays and ADHD combined type  1 )  Leti Cope is currently doing all virtual school at home  His mother has an academic routine for him while he is home  She would like to see him go back to school in December 2020  He will be going into his school now for adaptive physical education and speech therapy  Please send in a copy of his IEP once it is available with updates of his progress or new goals  2 )  Genetics: At his last visit genetic testing had been offered  Please let us know if you would like this to be completed at one of his follow-up visit  We can also have him come in separately just for this test ( It is a swab of his mouth)  3 ) Please call this office if you plan to pursue Intensive Behavior Health Services (IBHS) for Applied behavioral analysis (ROSSANA) through another provider besides the   We can provide of list of potential programs  4 ) Attention deficit hyperactivity disorder (ADHD):  He has inattentive, hyperactive and impulsive behaviors that meet DSM-5  criteria for ADHD combined type  There are 3 main interventions for ADHD: behavioral management, medication management, or a combination of both  Current studies show behavioral interventions have a significant impact on a childs ability to regulate their behaviors and learn coping skills for angry or emotional outbursts  I discussed  when to consider using medication  I reviewed the use of medications (side effects and target symptoms) for ADHD   his family was given additional educational information that reviews side effects and target symptoms as well as well as other references on when to be concerned about ADHD versus other behavioral or learning difficulties  We will call his mother to review target symptoms and when to be concern for side effects  1) Stimulants: We discussed the use of stimulants such as Dexamphetamines  These medications are for target symptoms of inattention, hyperactivity and impulsivity in ADHD  He is to start Adderall XR 5 mg at in the morning with breakfast   His mother was told she can open the capsule and put in soft food but Not liquid  He currently likes ice cream so he will get a scoop of ice cream with his medicine every morning  Please make sure he does not chew the ice cream   If he chews his ice cream, he will likely chew the beads and this will affect the way the medication is released in his body  When [facility] prescribes your child's medication, we follow up within one month after initially starting medication, then every three months  We can work with your child's primary care provider (PCP) to establish alternating visits if you have trouble with transportation but we will request that only one physician be the primary prescriber for medication  He had a Functional behavioral assessment (FBA) in February 2020  Accommodations and Behavior Intervention Plan (BIP) or Positive Behavior Plan   at school are important to help improve attention  Working on coping strategies and self regulation for anxiety, emotional dysregulation and attention skills are beneficial for Children with ADHD and autism  It is important that your child gets positive reinforcement when he does a task well but it does not always have to be loud praise  Many children with ADHD, anxiety and emotional outbursts do better with silent praise such as a thumbs up or high five, or place a note on his  desk to let the child know they have done a good job or made a good choice       Continue with accommodations for attention and sensory processing difficulties  Children with ADHD often have sensory difficulties as well and require additional supports to in class and at home to help them stay on task  A school OT evaluation  with direct or indirect services, can  provided therapeutic interventions such as movement breaks or sensory processing  techniques, strategies fidgety items that can be used to improve focus in class such as bungee bands, swivel chair, cushion on the floor for [place] time or deep pressure  Internet resource that may be helpful to you and your child:       www  RELL org  www cdc gov under ADHD  www understood  com   www additudemag  com     www [employer]  com ( what parents should know about student rights for IEP and 436 2743)    Learning disabilities:  www  LD org   www ldonline  org    www understood  org    Thank you for allowing us to take part in your child's care  no

## 2021-10-28 NOTE — ED ADULT TRIAGE NOTE - DOMESTIC TRAVEL HIGH RISK QUESTION
Dictation #1  MRN:[id number]  [facility]:[id number]  Pediatric Neurology Clinic note 2019  Raghav Widle date of birth 2013    This is a 6-year-old girl I last saw year and a half ago for tics behavior problems and ADHD.  She has had a normal CT and EEG.  I placed her on Haldol.  Since that time she has been seeing Psychiatry and a behavioral therapist.  She is unrelieved overactive impulsive and sometimes violent.  She is currently taking Haldol 1 mg at bedtime, clonidine at bedtime and methylphenidate in the morning.  Her tics are largely controlled although they occasionally exacerbate.  Her psychiatrist has been managing her Haldol.  This school once her evaluated for possible autism for educational placement. She was a premature .  She has had heel cord lengthening strabismus surgery tonsillectomy and adenoidectomy.  She takes albuterol and Flonase for occasional asthma.  She is in the first grade and making A's and B's I am told although her behavior is a major problem.  There is a family history of ADHD.  She lives with both parents.  General review of systems is benign.    Weight 26.4 kg height 124 cm blood pressure 99/72 normal body habitus.  She is active but cooperative. Head eyes ears nose and throat were normal.  Neck is supple no masses chest clear no murmurs abdomen benign.  She is really not cooperative for mental testing. Cranial nerves intact with normal fundi pupils eye movements facial movements hearing neck and trapezius strength and tongue protrusion.  Deep tendon reflexes are 2+, no pathologic reflexes.  Muscle tone and strength normal in all 4 extremities.  Normal gait, no ataxia.  Sensation intact to touch.    Given the concern about possible autistic spectrum disorder and her major behavior problems, I have sent her to [facility] for testing and educational recommendations.  Her psychiatrist is managing her tic disorders and her hand her other medications.  I will see her  back as need be in the future.---Renaldo Pollard MD   No

## 2021-10-28 NOTE — ED ADULT NURSE NOTE - NSIMPLEMENTINTERV_GEN_ALL_ED
Implemented All Universal Safety Interventions:  [place] to call system. Call bell, personal items and telephone within reach. Instruct patient to call for assistance. Room bathroom lighting operational. Non-slip footwear when patient is off stretcher. Physically safe environment: no spills, clutter or unnecessary equipment. Stretcher in lowest position, wheels locked, appropriate side rails in place.

## 2021-10-29 VITALS
OXYGEN SATURATION: 98 % | DIASTOLIC BLOOD PRESSURE: 75 MMHG | HEART RATE: 76 BPM | TEMPERATURE: 98 F | RESPIRATION RATE: 16 BRPM | SYSTOLIC BLOOD PRESSURE: 137 MMHG

## 2021-10-29 PROCEDURE — 70496 CT ANGIOGRAPHY HEAD: CPT | Mod: 26,MC

## 2021-10-29 PROCEDURE — 85025 COMPLETE CBC W/AUTO DIFF WBC: CPT

## 2021-10-29 PROCEDURE — 70496 CT ANGIOGRAPHY HEAD: CPT | Mod: MC

## 2021-10-29 PROCEDURE — 82550 ASSAY OF CK (CPK): CPT

## 2021-10-29 PROCEDURE — 96375 TX/PRO/DX INJ NEW DRUG ADDON: CPT

## 2021-10-29 PROCEDURE — 83690 ASSAY OF LIPASE: CPT

## 2021-10-29 PROCEDURE — 70498 CT ANGIOGRAPHY NECK: CPT | Mod: MC

## 2021-10-29 PROCEDURE — 82553 CREATINE MB FRACTION: CPT

## 2021-10-29 PROCEDURE — 96374 THER/PROPH/DIAG INJ IV PUSH: CPT

## 2021-10-29 PROCEDURE — 80053 COMPREHEN METABOLIC PANEL: CPT

## 2021-10-29 PROCEDURE — 93005 ELECTROCARDIOGRAM TRACING: CPT

## 2021-10-29 PROCEDURE — 70450 CT HEAD/BRAIN W/O DYE: CPT | Mod: MC

## 2021-10-29 PROCEDURE — 84484 ASSAY OF TROPONIN QUANT: CPT

## 2021-10-29 PROCEDURE — 71045 X-RAY EXAM CHEST 1 VIEW: CPT

## 2021-10-29 PROCEDURE — 99284 EMERGENCY DEPT VISIT MOD MDM: CPT | Mod: 25

## 2021-10-29 PROCEDURE — 36415 COLL VENOUS BLD VENIPUNCTURE: CPT

## 2021-10-29 PROCEDURE — 70450 CT HEAD/BRAIN W/O DYE: CPT | Mod: 26,MC,59

## 2021-10-29 PROCEDURE — 70498 CT ANGIOGRAPHY NECK: CPT | Mod: 26,MC

## 2021-10-29 RX ORDER — MECLIZINE HCL 12.5 MG
1 TABLET ORAL
Qty: 9 | Refills: 0
Start: 2021-10-29 | End: 2021-10-31

## 2021-10-29 NOTE — ED PROVIDER NOTE - OBJECTIVE STATEMENT
Pt is a 71F who presents to ED for chest pain. pt states she had onset of dizziness earlier today described as the room spinning associated with discomfort in her chest. No numbness or tingling. Pt has no current CP. No cough, cold or congestion. Pt states she has mild persistent dizziness.

## 2021-10-29 NOTE — ED PROVIDER NOTE - NSFOLLOWUPINSTRUCTIONS_ED_ALL_ED_FT
Chest Pain    Chest pain can be caused by many different conditions which may or may not be dangerous. Causes include heartburn, lung infections, heart attack, blood clot in lungs, skin infections, strain or damage to muscle, cartilage, or bones, etc. In addition to a history and physical examination, an electrocardiogram (ECG) or other lab tests may have been performed to determine the cause of your chest pain. Follow up with your primary care provider or with a cardiologist as instructed.     SEEK IMMEDIATE MEDICAL CARE IF YOU HAVE ANY OF THE FOLLOWING SYMPTOMS: worsening chest pain, coughing up blood, unexplained back/neck/jaw pain, severe abdominal pain, dizziness or lightheadedness, fainting, shortness of breath, sweaty or clammy skin, vomiting, or racing heart beat. These symptoms may represent a serious problem that is an emergency. Do not wait to see if the symptoms will go away. Get medical help right away. Call 911 and do not drive yourself to the hospital.        Vertigo    WHAT YOU NEED TO KNOW:    Vertigo is a condition that causes you to feel dizzy. You may feel that you or everything around you is moving or spinning. You may also feel like you are being pulled down or toward your side.     DISCHARGE INSTRUCTIONS:    Seek care immediately if:   •You have a headache and a stiff neck.      •You have shaking chills and a fever.       •You vomit over and over with no relief.       •You have blood, pus, or fluid coming out of your ears.      •You are confused.       Contact your healthcare provider if:   •Your symptoms do not get better with treatment.       •You have questions about your condition or care.      Medicines:   •Medicine may be given to help relieve your symptoms.      •Take your medicine as directed. Contact your healthcare provider if you think your medicine is not helping or if you have side effects. Tell him or her if you are allergic to any medicine. Keep a list of the medicines, vitamins, and herbs you take. Include the amounts, and when and why you take them. Bring the list or the pill bottles to follow-up visits. Carry your medicine list with you in case of an emergency.      Manage your symptoms:   •Do not drive, walk without help, or operate heavy machinery when you are dizzy.       •Move slowly when you move from one position to another position. Get up slowly from sitting or lying down. Sit or lie down right away if you feel dizzy.      •Drink plenty of liquids. Liquids help prevent dehydration. Ask how much liquid to drink each day and which liquids are best for you.      •Vestibular and balance rehabilitation therapy (VBRT) is used to teach you exercises to improve your balance and strength. These exercises may help decrease your vertigo and improve your balance. Ask for more information about this therapy.      Follow up with your doctor as directed: Write down your questions so you remember to ask them during your visits.        © Copyright [employer] 2021

## 2021-10-29 NOTE — ED PROVIDER NOTE - PATIENT PORTAL LINK FT
You can access the FollowMyHealth Patient Portal offered by [facility] by registering at the following website: [email or web address]. By joining [facility]’s FollowMyHealth portal, you will also be able to view your health information using other applications (apps) compatible with our system.

## 2021-10-29 NOTE — ED PROVIDER NOTE - CLINICAL SUMMARY MEDICAL DECISION MAKING FREE TEXT BOX
Pt is a 71F with vertigo and chest pain. Will check labs, CXR, EKG. CTA head and neck done. CT head.  Pt given meclizine PO in ED. Re-assess.   On re-evaluation, pt is AAox3 and in NAD. Vitals wnl. pt with resolution of symptoms in ED. Instructions given to follow up with cardiologist and neurologist in 2-3 days, return to ED for worsening condition. Pt expresses understanding.

## 2021-11-10 NOTE — PHYSICAL EXAM
Patient made aware of 24/7 emergency services. [de-identified] : cleaned w microscope and suctioned [de-identified] : Bleeding Rt ear and ext otitis and cerumen [Midline] : trachea located in midline position [Normal] : mucosa is normal

## 2021-12-23 ENCOUNTER — EMERGENCY (EMERGENCY)
Facility: HOSPITAL | Age: 71
LOS: 1 days | Discharge: HOME | End: 2021-12-23
Attending: EMERGENCY MEDICINE | Admitting: EMERGENCY MEDICINE
Payer: MEDICARE

## 2021-12-23 VITALS
RESPIRATION RATE: 18 BRPM | TEMPERATURE: 98 F | DIASTOLIC BLOOD PRESSURE: 68 MMHG | HEART RATE: 78 BPM | SYSTOLIC BLOOD PRESSURE: 163 MMHG | HEIGHT: 60 IN | WEIGHT: 134.04 LBS | OXYGEN SATURATION: 97 %

## 2021-12-23 DIAGNOSIS — R21 RASH AND OTHER NONSPECIFIC SKIN ERUPTION: ICD-10-CM

## 2021-12-23 DIAGNOSIS — E11.9 TYPE 2 DIABETES MELLITUS WITHOUT COMPLICATIONS: ICD-10-CM

## 2021-12-23 DIAGNOSIS — L27.0 GENERALIZED SKIN ERUPTION DUE TO DRUGS AND MEDICAMENTS TAKEN INTERNALLY: ICD-10-CM

## 2021-12-23 DIAGNOSIS — J45.909 UNSPECIFIED ASTHMA, UNCOMPLICATED: ICD-10-CM

## 2021-12-23 DIAGNOSIS — I10 ESSENTIAL (PRIMARY) HYPERTENSION: ICD-10-CM

## 2021-12-23 DIAGNOSIS — Z98.890 OTHER SPECIFIED POSTPROCEDURAL STATES: Chronic | ICD-10-CM

## 2021-12-23 PROCEDURE — 99283 EMERGENCY DEPT VISIT LOW MDM: CPT

## 2021-12-23 PROCEDURE — 99284 EMERGENCY DEPT VISIT MOD MDM: CPT

## 2021-12-23 RX ORDER — FAMOTIDINE 10 MG/ML
1 INJECTION INTRAVENOUS
Qty: 4 | Refills: 0
Start: 2021-12-23 | End: 2021-12-26

## 2021-12-23 RX ORDER — DIPHENHYDRAMINE HCL 50 MG
50 CAPSULE ORAL ONCE
Refills: 0 | Status: COMPLETED | OUTPATIENT
Start: 2021-12-23 | End: 2021-12-23

## 2021-12-23 RX ORDER — DIPHENHYDRAMINE HCL 50 MG
2 CAPSULE ORAL
Qty: 40 | Refills: 0
Start: 2021-12-23 | End: 2021-12-27

## 2021-12-23 RX ORDER — FAMOTIDINE 10 MG/ML
20 INJECTION INTRAVENOUS ONCE
Refills: 0 | Status: COMPLETED | OUTPATIENT
Start: 2021-12-23 | End: 2021-12-23

## 2021-12-23 RX ADMIN — Medication 50 MILLIGRAM(S): at 15:15

## 2021-12-23 RX ADMIN — FAMOTIDINE 20 MILLIGRAM(S): 10 INJECTION INTRAVENOUS at 15:16

## 2021-12-23 RX ADMIN — Medication 60 MILLIGRAM(S): at 15:16

## 2021-12-23 NOTE — ED PROVIDER NOTE - PHYSICAL EXAMINATION
CONSTITUTIONAL: Well-appearing; well-nourished; in no apparent distress.   HEAD: Normocephalic; atraumatic.   EYES:  conjunctiva and sclera clear  ENT: normal nose; no rhinorrhea;  NECK: Supple; full ROM  RESPIRATORY: Breathing easily; no resp difficulty  EXT: No cyanosis or edema;  SKIN: Normal for age and race; warm; dry; good turgor; +urticarial lesions over extremities  NEURO: A & O x 3; face symmetric; grossly unremarkable.   PSYCHOLOGICAL: The patient’s mood and manner are appropriate.

## 2021-12-23 NOTE — ED PROVIDER NOTE - NSFOLLOWUPINSTRUCTIONS_ED_ALL_ED_FT
Antibiotic Medication Allergy    WHAT YOU NEED TO KNOW:    An antibiotic medication allergy is a harmful reaction to an antibiotic. The reaction can start soon after you take the medicine, or days or weeks after you stop. Healthcare providers cannot know ahead of time if you will have an allergic reaction. Your immune system may become sensitive to the antibiotic the first time you take it. You may have an allergic reaction the next time. The antibiotics most likely to cause an allergic reaction are penicillins and cephalosporins.    DISCHARGE INSTRUCTIONS:    Call 911 for signs or symptoms of anaphylaxis, such as trouble breathing, swelling in your mouth or throat, or wheezing. You may also have itching, a rash, hives, or feel like you are going to faint.    Seek care immediately if:   •You have a rash with itchy, swollen, red spots.      •You have blisters, or your skin is peeling.      •You have trouble swallowing or your voice sounds hoarse.      •You have a fast or pounding heartbeat.      •Your skin or the whites of your eyes turn yellow.       Contact your healthcare provider if:   •You think you are having an allergic reaction. Contact your healthcare provider before you take another dose of your antibiotic.       •You have a rash.      •You have a fever.      •You have a sore throat or swollen glands. You will feel hard lumps when you touch your throat if your glands are swollen.       •Your skin itches and becomes red when you are in the sunlight.      •You have questions or concerns about your condition, allergy, or care.      Medicines:   •Epinephrine is used to treat severe allergic reactions such as anaphylaxis.      •Antihistamines decrease mild symptoms such as itching or a rash.      •Steroids reduce inflammation.       •Take your medicine as directed. Contact your healthcare provider if you think your medicine is not helping or if you have side effects. Tell him or her if you are allergic to any medicine. Keep a list of the medicines, vitamins, and herbs you take. Include the amounts, and when and why you take them. Bring the list or the pill bottles to follow-up visits. Carry your medicine list with you in case of an emergency.      Follow up with your healthcare provider as directed: Ask if you need to avoid other medicines you may be allergic to. Write down your questions so you remember to ask them during your visits.     Steps to take for signs or symptoms of anaphylaxis:   •Immediately give 1 shot of epinephrine only into the outer thigh muscle.       •Leave the shot in place as directed. Your healthcare provider may recommend you leave it in place for up to 10 seconds before you remove it. This helps make sure all of the epinephrine is delivered.       •Call 911 and go to the emergency department, even if the shot improved symptoms. Do not drive yourself. Bring the used epinephrine shot with you.       Safety precautions to take if you are at risk for anaphylaxis:   •Keep 2 shots of epinephrine with you at all times. You may need a second shot, because epinephrine only works for about 20 minutes and symptoms may return. Your healthcare provider can show you and family members how to give the shot. Check the expiration date every month and replace it before it expires.      •Create an action plan. Your healthcare provider can help you create a written plan that explains the allergy and an emergency plan to treat a reaction. The plan explains when to give a second epinephrine shot if symptoms return or do not improve after the first. Give copies of the action plan and emergency instructions to family members, work and school staff, and  providers. Show them how to give a shot of epinephrine.      •Be careful when you exercise. If you have had exercise-induced anaphylaxis, do not exercise right after you eat. Stop exercising right away if you start to develop any signs or symptoms of anaphylaxis. You may first feel tired, warm, or have itchy skin. Hives, swelling, and severe breathing problems may develop if you continue to exercise.      •Carry medical alert identification. Wear medical alert jewelry or carry a card that says you have an antibiotic medicine allergy. Healthcare providers need to know that they should not give you this antibiotic. Ask your healthcare provider where to get these items.  Medical Alert Jewelry           •Read medicine labels before you use any medicine. Do not take the medicine if it contains the antibiotic that you are allergic to. This includes topical medicines that you put on your skin. Ask a pharmacist if you are not sure.       •Tell all healthcare providers about your allergy. Always tell your healthcare providers the names of medicines that you are allergic to and the symptoms of your allergic reactions.      •Ask if you need to avoid other medicines. You may be allergic to other medicines if you had an allergic reaction to an antibiotic. Make sure you know the names of other medicines that you should not take.

## 2021-12-23 NOTE — ED PROVIDER NOTE - OBJECTIVE STATEMENT
71F co itchy generalized rash to body x4 days. Pt reports she took augmentin around1  month ago for partial toe amputation and noticed sx started after that, states finished rx 7 days ago. Pt given rx for medrol dosepak but no relief. no wheezing, sob, cp, trouble speaking/swallowing. never had this before

## 2021-12-23 NOTE — ED PROVIDER NOTE - PATIENT PORTAL LINK FT
You can access the FollowMyHealth Patient Portal offered by [facility] by registering at the following website: [email or web address]. By joining [facility]’s FollowMyHealth portal, you will also be able to view your health information using other applications (apps) compatible with our system.

## 2021-12-23 NOTE — ED ADULT NURSE NOTE - OBJECTIVE STATEMENT
72yo female c/o rash x 2 weeks noted after taking augmentin. PT with redness and itching upon examination

## 2021-12-23 NOTE — ED ADULT TRIAGE NOTE - CHIEF COMPLAINT QUOTE
Pt presents co itchy generalized rash to body x15 days. Airway patent, speaking clearly and coherently, respirations unlabored. Pt reports she took augmentin around1  month ago for partial toe amputation and noticed sx started after that. Pt seen by PCP 2 days ago and given rx for prednisone, denies relief.

## 2021-12-29 ENCOUNTER — EMERGENCY (EMERGENCY)
Facility: HOSPITAL | Age: 71
LOS: 1 days | Discharge: HOME | End: 2021-12-29
Attending: EMERGENCY MEDICINE | Admitting: EMERGENCY MEDICINE
Payer: MEDICARE

## 2021-12-29 VITALS
SYSTOLIC BLOOD PRESSURE: 150 MMHG | DIASTOLIC BLOOD PRESSURE: 83 MMHG | HEIGHT: 60 IN | WEIGHT: 132.94 LBS | OXYGEN SATURATION: 98 % | HEART RATE: 71 BPM | RESPIRATION RATE: 16 BRPM | TEMPERATURE: 98 F

## 2021-12-29 VITALS
HEART RATE: 62 BPM | SYSTOLIC BLOOD PRESSURE: 129 MMHG | RESPIRATION RATE: 18 BRPM | OXYGEN SATURATION: 99 % | DIASTOLIC BLOOD PRESSURE: 64 MMHG | TEMPERATURE: 98 F

## 2021-12-29 DIAGNOSIS — M54.2 CERVICALGIA: ICD-10-CM

## 2021-12-29 DIAGNOSIS — E11.9 TYPE 2 DIABETES MELLITUS WITHOUT COMPLICATIONS: ICD-10-CM

## 2021-12-29 DIAGNOSIS — R42 DIZZINESS AND GIDDINESS: ICD-10-CM

## 2021-12-29 DIAGNOSIS — R20.0 ANESTHESIA OF SKIN: ICD-10-CM

## 2021-12-29 DIAGNOSIS — Z79.51 LONG TERM (CURRENT) USE OF INHALED STEROIDS: ICD-10-CM

## 2021-12-29 DIAGNOSIS — E78.5 HYPERLIPIDEMIA, UNSPECIFIED: ICD-10-CM

## 2021-12-29 DIAGNOSIS — J45.909 UNSPECIFIED ASTHMA, UNCOMPLICATED: ICD-10-CM

## 2021-12-29 DIAGNOSIS — I10 ESSENTIAL (PRIMARY) HYPERTENSION: ICD-10-CM

## 2021-12-29 DIAGNOSIS — Z20.822 CONTACT WITH AND (SUSPECTED) EXPOSURE TO COVID-19: ICD-10-CM

## 2021-12-29 DIAGNOSIS — Z98.890 OTHER SPECIFIED POSTPROCEDURAL STATES: Chronic | ICD-10-CM

## 2021-12-29 DIAGNOSIS — Z79.4 LONG TERM (CURRENT) USE OF INSULIN: ICD-10-CM

## 2021-12-29 LAB
ALBUMIN SERPL ELPH-MCNC: 4.4 G/DL — SIGNIFICANT CHANGE UP (ref 3.3–5)
ALP SERPL-CCNC: 87 U/L — SIGNIFICANT CHANGE UP (ref 40–120)
ALT FLD-CCNC: 15 U/L — SIGNIFICANT CHANGE UP (ref 10–45)
ANION GAP SERPL CALC-SCNC: 11 MMOL/L — SIGNIFICANT CHANGE UP (ref 5–17)
APPEARANCE UR: CLEAR — SIGNIFICANT CHANGE UP
AST SERPL-CCNC: 14 U/L — SIGNIFICANT CHANGE UP (ref 10–40)
BASOPHILS # BLD AUTO: 0.04 K/UL — SIGNIFICANT CHANGE UP (ref 0–0.2)
BASOPHILS NFR BLD AUTO: 0.4 % — SIGNIFICANT CHANGE UP (ref 0–2)
BILIRUB SERPL-MCNC: 0.3 MG/DL — SIGNIFICANT CHANGE UP (ref 0.2–1.2)
BILIRUB UR-MCNC: NEGATIVE — SIGNIFICANT CHANGE UP
BLD GP AB SCN SERPL QL: NEGATIVE — SIGNIFICANT CHANGE UP
BUN SERPL-MCNC: 15 MG/DL — SIGNIFICANT CHANGE UP (ref 7–23)
CALCIUM SERPL-MCNC: 9.2 MG/DL — SIGNIFICANT CHANGE UP (ref 8.4–10.5)
CHLORIDE SERPL-SCNC: 103 MMOL/L — SIGNIFICANT CHANGE UP (ref 96–108)
CO2 SERPL-SCNC: 26 MMOL/L — SIGNIFICANT CHANGE UP (ref 22–31)
COLOR SPEC: YELLOW — SIGNIFICANT CHANGE UP
CREAT SERPL-MCNC: 0.71 MG/DL — SIGNIFICANT CHANGE UP (ref 0.5–1.3)
DIFF PNL FLD: NEGATIVE — SIGNIFICANT CHANGE UP
EOSINOPHIL # BLD AUTO: 0.14 K/UL — SIGNIFICANT CHANGE UP (ref 0–0.5)
EOSINOPHIL NFR BLD AUTO: 1.3 % — SIGNIFICANT CHANGE UP (ref 0–6)
GLUCOSE SERPL-MCNC: 205 MG/DL — HIGH (ref 70–99)
GLUCOSE UR QL: NEGATIVE — SIGNIFICANT CHANGE UP
HCT VFR BLD CALC: 37.5 % — SIGNIFICANT CHANGE UP (ref 34.5–45)
HGB BLD-MCNC: 12.9 G/DL — SIGNIFICANT CHANGE UP (ref 11.5–15.5)
IMM GRANULOCYTES NFR BLD AUTO: 0.5 % — SIGNIFICANT CHANGE UP (ref 0–1.5)
INR BLD: 0.9 — SIGNIFICANT CHANGE UP (ref 0.88–1.16)
KETONES UR-MCNC: NEGATIVE — SIGNIFICANT CHANGE UP
LEUKOCYTE ESTERASE UR-ACNC: NEGATIVE — SIGNIFICANT CHANGE UP
LYMPHOCYTES # BLD AUTO: 2.62 K/UL — SIGNIFICANT CHANGE UP (ref 1–3.3)
LYMPHOCYTES # BLD AUTO: 24.1 % — SIGNIFICANT CHANGE UP (ref 13–44)
MCHC RBC-ENTMCNC: 31.7 PG — SIGNIFICANT CHANGE UP (ref 27–34)
MCHC RBC-ENTMCNC: 34.4 GM/DL — SIGNIFICANT CHANGE UP (ref 32–36)
MCV RBC AUTO: 92.1 FL — SIGNIFICANT CHANGE UP (ref 80–100)
MONOCYTES # BLD AUTO: 0.63 K/UL — SIGNIFICANT CHANGE UP (ref 0–0.9)
MONOCYTES NFR BLD AUTO: 5.8 % — SIGNIFICANT CHANGE UP (ref 2–14)
NEUTROPHILS # BLD AUTO: 7.37 K/UL — SIGNIFICANT CHANGE UP (ref 1.8–7.4)
NEUTROPHILS NFR BLD AUTO: 67.9 % — SIGNIFICANT CHANGE UP (ref 43–77)
NITRITE UR-MCNC: NEGATIVE — SIGNIFICANT CHANGE UP
NRBC # BLD: 0 /100 WBCS — SIGNIFICANT CHANGE UP (ref 0–0)
PH UR: 6.5 — SIGNIFICANT CHANGE UP (ref 5–8)
PLATELET # BLD AUTO: 229 K/UL — SIGNIFICANT CHANGE UP (ref 150–400)
POTASSIUM SERPL-MCNC: 3.9 MMOL/L — SIGNIFICANT CHANGE UP (ref 3.5–5.3)
POTASSIUM SERPL-SCNC: 3.9 MMOL/L — SIGNIFICANT CHANGE UP (ref 3.5–5.3)
PROT SERPL-MCNC: 6.7 G/DL — SIGNIFICANT CHANGE UP (ref 6–8.3)
PROT UR-MCNC: NEGATIVE MG/DL — SIGNIFICANT CHANGE UP
PROTHROM AB SERPL-ACNC: 10.9 SEC — SIGNIFICANT CHANGE UP (ref 10.6–13.6)
RBC # BLD: 4.07 M/UL — SIGNIFICANT CHANGE UP (ref 3.8–5.2)
RBC # FLD: 13.3 % — SIGNIFICANT CHANGE UP (ref 10.3–14.5)
RH IG SCN BLD-IMP: POSITIVE — SIGNIFICANT CHANGE UP
SARS-COV-2 RNA SPEC QL NAA+PROBE: NEGATIVE — SIGNIFICANT CHANGE UP
SODIUM SERPL-SCNC: 140 MMOL/L — SIGNIFICANT CHANGE UP (ref 135–145)
SP GR SPEC: <=1.005 — SIGNIFICANT CHANGE UP (ref 1–1.03)
UROBILINOGEN FLD QL: 0.2 E.U./DL — SIGNIFICANT CHANGE UP
WBC # BLD: 10.85 K/UL — HIGH (ref 3.8–10.5)
WBC # FLD AUTO: 10.85 K/UL — HIGH (ref 3.8–10.5)

## 2021-12-29 PROCEDURE — 85025 COMPLETE CBC W/AUTO DIFF WBC: CPT

## 2021-12-29 PROCEDURE — 70450 CT HEAD/BRAIN W/O DYE: CPT | Mod: MC

## 2021-12-29 PROCEDURE — 86850 RBC ANTIBODY SCREEN: CPT

## 2021-12-29 PROCEDURE — 0042T: CPT

## 2021-12-29 PROCEDURE — 82962 GLUCOSE BLOOD TEST: CPT

## 2021-12-29 PROCEDURE — 85730 THROMBOPLASTIN TIME PARTIAL: CPT

## 2021-12-29 PROCEDURE — 70498 CT ANGIOGRAPHY NECK: CPT | Mod: MC

## 2021-12-29 PROCEDURE — 93010 ELECTROCARDIOGRAM REPORT: CPT

## 2021-12-29 PROCEDURE — 81003 URINALYSIS AUTO W/O SCOPE: CPT

## 2021-12-29 PROCEDURE — 87635 SARS-COV-2 COVID-19 AMP PRB: CPT

## 2021-12-29 PROCEDURE — 70498 CT ANGIOGRAPHY NECK: CPT | Mod: 26,MC

## 2021-12-29 PROCEDURE — 36415 COLL VENOUS BLD VENIPUNCTURE: CPT

## 2021-12-29 PROCEDURE — 86901 BLOOD TYPING SEROLOGIC RH(D): CPT

## 2021-12-29 PROCEDURE — 70496 CT ANGIOGRAPHY HEAD: CPT | Mod: MC

## 2021-12-29 PROCEDURE — 86900 BLOOD TYPING SEROLOGIC ABO: CPT

## 2021-12-29 PROCEDURE — 80053 COMPREHEN METABOLIC PANEL: CPT

## 2021-12-29 PROCEDURE — 96374 THER/PROPH/DIAG INJ IV PUSH: CPT | Mod: XU

## 2021-12-29 PROCEDURE — 93005 ELECTROCARDIOGRAM TRACING: CPT

## 2021-12-29 PROCEDURE — 99285 EMERGENCY DEPT VISIT HI MDM: CPT

## 2021-12-29 PROCEDURE — 70496 CT ANGIOGRAPHY HEAD: CPT | Mod: 26,MC

## 2021-12-29 PROCEDURE — 99285 EMERGENCY DEPT VISIT HI MDM: CPT | Mod: 25

## 2021-12-29 PROCEDURE — 85610 PROTHROMBIN TIME: CPT

## 2021-12-29 RX ORDER — SODIUM CHLORIDE 9 MG/ML
1000 INJECTION INTRAMUSCULAR; INTRAVENOUS; SUBCUTANEOUS ONCE
Refills: 0 | Status: COMPLETED | OUTPATIENT
Start: 2021-12-29 | End: 2021-12-29

## 2021-12-29 RX ORDER — ONDANSETRON 8 MG/1
4 TABLET, FILM COATED ORAL ONCE
Refills: 0 | Status: COMPLETED | OUTPATIENT
Start: 2021-12-29 | End: 2021-12-29

## 2021-12-29 RX ORDER — MECLIZINE HCL 12.5 MG
25 TABLET ORAL ONCE
Refills: 0 | Status: COMPLETED | OUTPATIENT
Start: 2021-12-29 | End: 2021-12-29

## 2021-12-29 RX ADMIN — SODIUM CHLORIDE 1000 MILLILITER(S): 9 INJECTION INTRAMUSCULAR; INTRAVENOUS; SUBCUTANEOUS at 16:10

## 2021-12-29 RX ADMIN — Medication 25 MILLIGRAM(S): at 16:00

## 2021-12-29 RX ADMIN — ONDANSETRON 4 MILLIGRAM(S): 8 TABLET, FILM COATED ORAL at 16:10

## 2021-12-29 NOTE — ED PROVIDER NOTE - PATIENT PORTAL LINK FT
You can access the FollowMyHealth Patient Portal offered by [facility] by registering at the following website: [email or web address]. By joining [facility]’s FollowMyHealth portal, you will also be able to view your health information using other applications (apps) compatible with our system.

## 2021-12-29 NOTE — ED PROVIDER NOTE - CARE PROVIDER_API CALL
Reina Tse)  Otolaryngology  [address] [place] [address]  Phone: [phone]  Fax: [phone]  Follow Up Time:     [place]  [facility]  [address], [address]  [place] [address]  Phone: [phone]  Fax: [phone]  Follow Up Time:

## 2021-12-29 NOTE — ED PROVIDER NOTE - NEUROLOGICAL, MLM
Alert and oriented, no focal deficits, no motor or sensory deficits. Horizontal nystagmus. No facial droop. Decreased sensation to b/l face.

## 2021-12-29 NOTE — ED PROVIDER NOTE - CLINICAL SUMMARY MEDICAL DECISION MAKING FREE TEXT BOX
72 y/o F with stroke code called at triage for dizziness and numbness. Pt main complaint is vertigo symptoms. No focal deficits on exam. Stroke workup initiated. Will reevaluated after pt is given Meclizine, Zofran, and fluids. 72 y/o F with stroke code called at triage for dizziness and numbness. Pt main complaint is vertigo symptoms. No focal deficits on exam. Stroke workup initiated. Will reevaluated after pt is given Meclizine, Zofran, and fluids.    Pt feeling better after medication.    Imaging negative for acute findings of stroke  Most likely peripheral vertigo.  Ambulating with steady gait and tolerating po.  Will give ENT referral.  Daughter also requesting derm follow up for itchiness.

## 2021-12-29 NOTE — ED ADULT NURSE NOTE - CHPI ED NUR SYMPTOMS NEG
no blurred vision/no change in level of consciousness/no confusion/no fever/no loss of consciousness/no numbness/no vomiting

## 2021-12-29 NOTE — CONSULT NOTE ADULT - ASSESSMENT
71y Female with PMHx of HTN, HLD, DM, vertigo, migraines, recent ED visit 6 days ago for allergic reaction, presents with dizziness. Last known well 10 pm 12/28. Patient woke up this morning with itchiness starting from her hands, moving to her chest, face, and ears, similar to her presentation for allergic reaction 6 days ago. She then developed b/l lower face numbness and paresthesia around her lips, jaw and radiating towards her ears. Then she started feeling dizzy, lightheadedness, unsteady with pain along her the back of her neck. NIHSS 0, with some symptom improvement towards end of code course. Imaging negative for stroke.     - symptoms not stroke related, likely peripheral in etiology  - trial of meclizine for symptom management   - no stroke w/u needed  - rest of care per ED

## 2021-12-29 NOTE — ED PROVIDER NOTE - NSFOLLOWUPINSTRUCTIONS_ED_ALL_ED_FT
Follow up with ENT recommended below for vertigo.    Take meclizine as needed for vertigo.    Follow up with dermatologist below for itchy skin.    Return to ED with worsening symptoms or other concerns.          Vértigo    LO QUE NECESITA SABER:    El vértigo es erika afección que hace que usted se sienta mareado. Es posible que tenga la sensación de que usted o todo a adams alrededor se está moviendo o dando vueltas. Usted también podría sentir jarrell que lo empujan hacia el piso o hacia un lado.    INSTRUCCIONES SOBRE EL [facility] HOSPITALARIA:    Busque atención médica de inmediato si:  •Usted tiene dolor de jones y rigidez en el jason.      •Usted tiene escalofríos con temblores y fiebre.      •Usted vomita erika y otra vez sin alivio.      •Le sale leny, pus o líquido de los oídos.      •Usted está confundido.      Comuníquese con adams médico si:  •Yajaira síntomas no mejoran con el tratamiento.      •Tiene alguna pregunta acerca de adams condición o cuidado.      Medicamentos:  •Medicamentospodría administrase para aliviar yajaira síntomas.      •[place] yajaira medicamentos jarrell se le haya indicado.Consulte con adams médico si usted marina que adams medicamento no le está ayudando o si presenta efectos secundarios. Infórmele si es alérgico a cualquier medicamento. Mantenga erika lista actualizada de los medicamentos, las vitaminas y los productos herbales que gideon. Incluya los siguientes datos de los medicamentos: cantidad, frecuencia y motivo de administración. Traiga con usted la lista o los envases de las píldoras a yajaira citas de seguimiento. Lleve la lista de los medicamentos con usted en ronnie de erika emergencia.      El manejo de yajaira síntomas:  •No manejeNo camine sin ayuda ni maneje maquinaria pesada cuando está mareado.      •Muévase lentamentecuando pase de erika posición a otra. Levántese lentamente después de kayleigh estado sentado o acostado. Siéntese o acuéstese en seguida si se siente mareado.      •[place] suficiente líquidos.Los líquidos ayudan a evitar la deshidratación. Pregunte cuánto líquido debe shira cada día y cuáles líquidos son los más adecuados para usted.      •Terapia de rehabilitación vestibular y del equilibrio (TRVE)se usa para enseñarle ejercicios para mejorar adams equilibrio y fuerza. Estos ejercicios pueden ayudarle a disminuir adams vértigo y a mejorar adams equilibrio. Pida más información sobre esta terapia.      Acuda a la consulta de control con adams médico según las indicaciones:Anote yajaira preguntas para que se acuerde de hacerlas jessica yajaira visitas.       © Copyright [employer] 2021           back to top                          © Copyright [employer] 2021

## 2021-12-29 NOTE — ED ADULT TRIAGE NOTE - CHIEF COMPLAINT QUOTE
Pt states she developed lip numbness, lightheadedness, headache at 0915 this morning upon waking. Denies vision changes, chest pain, shortness of breath. Neuro intact, sensation decreased to mouth area.

## 2021-12-29 NOTE — ED ADULT NURSE NOTE - OBJECTIVE STATEMENT
Pt BIBE with complaints of bilateral facial numbness, headache, dizziness since this morning. Improved with movements. Stroke code was activated in triage. Dr Cruz & neuro team is at the bedside. Pt denies chest pain, SOB, n/v, chest pain, numbness or tingling to her extremities. Initial NIH 0. Pt stated she has a history of diabetes, vertigo, hypertension, hyperlipidemia. Pt BIBE with complaints of bilateral facial numbness, headache, dizziness since this morning. Improved with movements. Stroke code was activated in triage. Dr Cruz & neuro team is at the bedside. Pt placed on portable continuous cardiac/pulse oximetry monitoring. Immediately brought to CT scan. Pt denies chest pain, SOB, n/v, chest pain, numbness or tingling to her extremities. Initial NIH 0. Pt stated she has a history of diabetes, vertigo, hypertension, hyperlipidemia. Pt A&Ox4, speaking in clear/full sentences, no acute distress, vital signs stable. Stroke code called. FS done.

## 2021-12-29 NOTE — ED PROVIDER NOTE - CONSTITUTIONAL, MLM
normal... Uncomfortable appearing secondary to vertigo. Keeping her eyes shut. Awake, alert, oriented to person, place, time/situation and in no apparent distress.

## 2021-12-29 NOTE — ED PROVIDER NOTE - MUSCULOSKELETAL, MLM
Spine appears normal, range of motion is not limited, no muscle or joint tenderness. 5/5 strength to b/l ue, le

## 2021-12-29 NOTE — ED ADULT NURSE NOTE - CARDIO ASSESSMENT
Dana returns today for right hip and right knee. The right hip is doing beautifully. I had performed a right hip replacement February 2018. She is doing well with this. She is having no pain. Right knee was also evaluated. She is having some anterior right knee pain. The patient has recently moved and is in [place]. She denies trauma or paresthesia and does not utilize any ambulatory aids.    Vital signs reviewed in stable    The right hip and knee were evaluated. Range of motion of the right hip is pain-free. Strength about the hip is 5 of 5. The right knee was evaluated. There is a slight varus deformity. There is some tenderness over the lateral patella facet. There is minimal medial joint line tenderness. There is no sign of meniscus pathology. Strength to the right lower extremity is 5 out of 5. There is no collateral or cruciate instability.    Radiographs show mild to moderate degenerative arthritis of the right knee. There is satisfactory right hip radiographs and AP pelvis radiograph showing normal appearing bilateral hip replacements.    Impression: Mild to moderate degenerative arthritis right knee    Recommendation: I have recommended to the patient that we proceed with a right knee intra-articular injection. The patient would like to do that. I've injected the right knee using aseptic technique with 80 mg Depo-Medrol 4 cc 0.5% bupivacaine. The patient tolerated this well. I will see the patient back as needed. All questions and concerns were addressed to the best my ability.  
---

## 2021-12-29 NOTE — ED PROVIDER NOTE - CARE PROVIDERS DIRECT ADDRESSES
[email or web address]

## 2021-12-29 NOTE — ED ADULT NURSE NOTE - NSIMPLEMENTINTERV_GEN_ALL_ED
Implemented All Fall with Harm Risk Interventions:  [place] to call system. Call bell, personal items and telephone within reach. Instruct patient to call for assistance. Room bathroom lighting operational. Non-slip footwear when patient is off stretcher. Physically safe environment: no spills, clutter or unnecessary equipment. Stretcher in lowest position, wheels locked, appropriate side rails in place. Provide visual cue, wrist band, yellow gown, etc. Monitor gait and stability. Monitor for mental status changes and reorient to person, place, and time. Review medications for side effects contributing to fall risk. Reinforce activity limits and safety measures with patient and family. Provide visual clues: red socks.

## 2021-12-29 NOTE — ED PROVIDER NOTE - OBJECTIVE STATEMENT
70 y/o F with PMHx HTN, HLD, and DM presents to ED with room spinning sensation and dizziness that began around 8am today. Pt also has c/o posterior neck pain, b/l facial numbness and hearing a humming sensation in both of her ears.  Pt has been feeling a full body itching sensation for the past few weeks. She was told it was an allergy and has been treating it as an allergic reaction. Denies any extremity weakness or tingling, fevers, or any other infectious symptoms.

## 2021-12-29 NOTE — ED ADULT NURSE NOTE - NS ED TRIAGE CLINICAL UPGRADE
Deteriorating patient status - Patient was clinically upgraded due to deteriorating patient status. yes... never

## 2021-12-29 NOTE — CONSULT NOTE ADULT - SUBJECTIVE AND OBJECTIVE BOX
**STROKE CODE CONSULT NOTE**  Uzbek speaking., daughter translating at bedside    Last known well time/Time of onset of symptoms: LKW 10pm 12/28/21    HPI: 71y Female with PMHx of HTN, HLD, DM, vertigo, migraines, recent ED visit 6 days ago for allergic reaction, presents with dizziness. Last known well 10 pm 12/28. Patient woke up this morning with itchiness starting from her hands, moving to her chest, face, and ears, similar to her presentation for allergic reaction 6 days ago. She then developed b/l lower face numbness and paresthesia around her lips, jaw and radiating towards her ears. Then she started feeling dizzy, lightheadedness, unsteady with pain along her the back of her neck. Dizziness improves once she sits still and closes her eyes, but worsens when walking, moving head and eyes. She does have bilateral tinnitus. She denies any recent trauma, sudden neck movements, changes in vision, extremity numbness/weakness. Patient initially did not want to go to ED until daughter convinced her to go after worsening symptoms.     /80, NIHSS 0. CTH negative for hemorrhage CTP without perfusion deficit. CTA with no LVO or stenosis. Dizziness mildly improved after CT imaging and patient was able to tolerate sitting up in stretcher during end of code, but declined to walk as she did not feel confident due to her dizziness.     T(C): 36.5 (12-29-21 @ 15:26), Max: 36.5 (12-29-21 @ 15:26)  HR: 62 (12-29-21 @ 16:00) (62 - 71)  BP: 156/74 (12-29-21 @ 16:00) (132/71 - 156/74)  RR: 18 (12-29-21 @ 16:00) (16 - 18)  SpO2: 96% (12-29-21 @ 16:00) (96% - 98%)    PAST MEDICAL & SURGICAL HISTORY:  Asthma  never hospitalized    Diabetes  Basal insulin    High cholesterol    Hypertension    H/O laminectomy  1990, 1991        FAMILY HISTORY:  FH: diabetes mellitus (Father, Mother)  mother and father        SOCIAL HISTORY:       ROS:   Constitutional: No fever, weight loss or fatigue  Eyes: No eye pain, visual disturbances, or discharge  ENMT:  +b/l tinnitus  Neck: No pain or stiffness  Respiratory: No cough, wheezing, chills or hemoptysis  Cardiovascular: No chest pain, palpitations, shortness of breath, or leg swelling  Gastrointestinal:+nausea  Genitourinary: No dysuria, frequency, hematuria or incontinence  Neurological: As per HPI      MEDICATIONS  (STANDING):    MEDICATIONS  (PRN):    Allergies    No Known Allergies    Intolerances      Vital Signs Last 24 Hrs  T(C): 36.5 (29 Dec 2021 15:26), Max: 36.5 (29 Dec 2021 15:26)  T(F): 97.7 (29 Dec 2021 15:26), Max: 97.7 (29 Dec 2021 15:26)  HR: 62 (29 Dec 2021 16:00) (62 - 71)  BP: 156/74 (29 Dec 2021 16:00) (132/71 - 156/74)  BP(mean): --  RR: 18 (29 Dec 2021 16:00) (16 - 18)  SpO2: 96% (29 Dec 2021 16:00) (96% - 98%)    Physical exam:  Constitutional: No acute distress, conversant  Eyes: Anicteric sclerae, moist conjunctivae, see below for CNs  Neck: trachea midline, FROM, supple, no thyromegaly or lymphadenopathy  Pulmonary: No increased work of breathing. No use of accessory muscles  GI: Abdomen soft, non-distended, non-tender  Extremities: no edema    Neurologic:  -Mental status: Awake, alert, oriented to person, place, and time. Speech is fluent with intact naming, repetition, and comprehension, no dysarthria. Recent and remote memory intact. Follows commands. Attention/concentration intact. Fund of knowledge appropriate.  -Cranial nerves:   II: Visual fields are full to confrontation.  III, IV, VI: Extraocular movements are intact without nystagmus. Pupils equally round and reactive to light  V:  Facial sensation V1-V3 equal and intact   VII: Face is symmetric with normal eye closure and smile  VIII: Hearing is grossly intact bilaterally  XII: Tongue protrudes midline  Motor: Normal bulk and tone. No pronator drift. Strength bilateral upper extremity 5/5, bilateral lower extremities 5/5.  Sensation: Intact to light touch bilaterally. No neglect or extinction on double simultaneous testing.  Coordination: No dysmetria on finger-to-nose bilaterally    NIHSS: 0 ASPECT Score: 8    Fingerstick Blood Glucose: CAPILLARY BLOOD GLUCOSE  196 (29 Dec 2021 16:39)      POCT Blood Glucose.: 196 mg/dL (29 Dec 2021 15:26)    LABS:                        12.9   10.85 )-----------( 229      ( 29 Dec 2021 16:08 )             37.5     12-29    140  |  103  |  15  ----------------------------<  205<H>  3.9   |  26  |  0.71    Ca    9.2      29 Dec 2021 16:08    TPro  6.7  /  Alb  4.4  /  TBili  0.3  /  DBili  x   /  AST  14  /  ALT  15  /  AlkPhos  87  12-29    PT/INR - ( 29 Dec 2021 16:08 )   PT: 10.9 sec;   INR: 0.90          PTT - ( 29 Dec 2021 16:08 )  PTT:33.1 sec          RADIOLOGY & ADDITIONAL STUDIES:  < from: CT Brain Stroke Protocol (12.29.21 @ 15:50) >  IMPRESSION: No intracranial hemorrhage or acute transcortical infarct.   Stable exam.    < end of copied text >  < from: CT Angio Head w/ IV Cont (12.29.21 @ 15:51) >  IMPRESSION: No large vessel occlusion.      < end of copied text >  < from: CT Angio Head w/ IV Cont (12.29.21 @ 15:51) >  IMPRESSION: No carotid stenosis.    < end of copied text >  < from: CT Perfusion w/ Maps w/ IV Cont (12.29.21 @ 15:51) >    IMPRESSION: Normal CT perfusion study.    < end of copied text >      -----------------------------------------------------------------------------------------------------------------  IV-tPA (Y/N):    n                                Reason IV-tPA not given: no focal neuro deficits, out of window

## 2021-12-30 RX ORDER — MECLIZINE HCL 12.5 MG
1 TABLET ORAL
Qty: 20 | Refills: 0
Start: 2021-12-30

## 2022-01-01 NOTE — PATIENT PROFILE ADULT - TOBACCO USE
Anus position normal and patency confirmed, rectal-cutaneous fistula absent, normal anal wink.
Current every day smoker

## 2022-02-02 NOTE — PATIENT PROFILE ADULT - BRADEN SCORE
Tried to call patient to discuss and had to leave a VM. It appears that he has not filled his suppressive doxycycline since March 2021 (per the pharmacist at his last listed pharmacy). If he has not been filling from other providers and has not been taking this medication since then, we will not plan to resume it since it can be assumed that he is stable off of suppression if he has not had problems in almost a year.   19

## 2022-02-04 ENCOUNTER — APPOINTMENT (OUTPATIENT)
Dept: OTOLARYNGOLOGY | Facility: CLINIC | Age: 72
End: 2022-02-04

## 2022-05-20 NOTE — ED ADULT NURSE NOTE - NS ED PATIENT SAFETY CONCERN
Dr. Villasenor has prescribed Hydrocodone-Acetaminophen on 5/10/22, but \"transmission to pharmacy failed\"   No

## 2022-11-07 NOTE — DISCHARGE NOTE PROVIDER - NSDCQMSTAIRS_GEN_ALL_CORE
Quality 110: Preventive Care And Screening: Influenza Immunization: Influenza Immunization Administered during Influenza season Detail Level: Detailed Quality 402: Tobacco Use And Help With Quitting Among Adolescents: Patient screened for tobacco and never smoked No

## 2022-12-11 DIAGNOSIS — Z23 ENCOUNTER FOR IMMUNIZATION: ICD-10-CM

## 2022-12-11 RX ORDER — UMECLIDINIUM 62.5 UG/1
AEROSOL, POWDER ORAL
Refills: 0 | Status: DISCONTINUED | COMMUNITY
End: 2022-12-11

## 2022-12-11 NOTE — HEALTH RISK ASSESSMENT
[HRA Reviewed] : Health risk assessment reviewed [Some assistance needed] : managing finances [No falls in past year] : Patient reported no falls in the past year [Yes] : The patient does have visual impairment

## 2022-12-12 ENCOUNTER — APPOINTMENT (OUTPATIENT)
Dept: HOME HEALTH SERVICES | Facility: HOME HEALTH | Age: 72
End: 2022-12-12

## 2022-12-12 VITALS
RESPIRATION RATE: 18 BRPM | HEART RATE: 70 BPM | DIASTOLIC BLOOD PRESSURE: 70 MMHG | SYSTOLIC BLOOD PRESSURE: 134 MMHG | TEMPERATURE: 97 F | OXYGEN SATURATION: 99 %

## 2022-12-12 DIAGNOSIS — D63.8 ANEMIA IN OTHER CHRONIC DISEASES CLASSIFIED ELSEWHERE: ICD-10-CM

## 2022-12-12 PROCEDURE — 99343: CPT

## 2022-12-12 PROCEDURE — G0506: CPT

## 2022-12-12 PROCEDURE — 99497 ADVNCD CARE PLAN 30 MIN: CPT

## 2022-12-12 RX ORDER — CIPROFLOXACIN AND DEXAMETHASONE 3; 1 MG/ML; MG/ML
0.3-0.1 SUSPENSION/ DROPS AURICULAR (OTIC)
Refills: 0 | Status: DISCONTINUED | COMMUNITY
End: 2022-12-12

## 2022-12-12 NOTE — COUNSELING
[Non - Smoker] : non-smoker [Use assistive device to avoid falls] : use assistive device to avoid falls [___] : [unfilled] [] : foot exam [Patient not on disease-modifying anti-rheumatic drug due to overall prognosis] : Patient not on disease-modifying anti-rheumatic drug due to overall prognosis [Not Recommended] : Aspirin use not recommended due to overall prognosis [Decrease hospital use] : decrease hospital use [Normal Weight - ( BMI  <25 )] : normal weight - ( BMI  <25 ) [Patient/Caregiver not ready to engage in discussion] : patient/caregiver not ready to engage in discussion [Full Code] : Code Status: Full Code [No Limitations] : Treatment Guidelines: No limitations [Long Term Intubation] : Intubation: Long term intubation [ - New patient with 2 or more chronic conditions; CCM discussed and patient-centered care plan established] : New patient with 2 or more chronic conditions; CCM discussed and patient-centered care plan established [FreeTextEntry3] : diabetic diet [FreeTextEntry4] : Educated patient/legal representative about CCM services and consent.\par Educated patient/legal representative that this service is available because they have 2 or more chronic conditions, that only one Provider can furnish CCM Services per calendar month and that CCM services are subject to the usual Medicare deductible and coinsurance. \par Patient/legal representative understands the right to stop the CCM services at any time by notifying House Calls office.\par Patient/legal representative has verbally consented 12/2022\par \par

## 2022-12-12 NOTE — REASON FOR VISIT
[Initial Evaluation] : an initial evaluation [Family Member] : family member [Formal Caregiver] : formal caregiver [Pre-Visit Preparation] : pre-visit preparation was done [Intercurrent Specialty/Sub-specialty Visits] : the patient has intercurrent specialty/sub-specialty visits [FreeTextEntry1] : 71 year old female with HTN, DM type 2, HLD, COPD, back surgery x 5 was admitted 4/17-4/21 with OM R hallux due to Staph aureus and Staph lugdunensis. [FreeTextEntry2] : MALISSA [FreeTextEntry3] : ID

## 2022-12-12 NOTE — CHRONIC CARE ASSESSMENT
[Patient Non-adherent to care plan] : patient non-adherent to care plan [PPS Score: ____] : Palliative Performance Scale (PPS) Score: [unfilled] [FAST Score: ____] : Functional Assessment Scale (FAST) Score: [unfilled] [de-identified] : as tolerated [de-identified] : diabetic diet

## 2022-12-13 ENCOUNTER — LABORATORY RESULT (OUTPATIENT)
Age: 72
End: 2022-12-13

## 2022-12-13 ENCOUNTER — NON-APPOINTMENT (OUTPATIENT)
Age: 72
End: 2022-12-13

## 2023-03-14 NOTE — PHYSICAL EXAM
[No Acute Distress] : no acute distress [Well Nourished] : well nourished [Well Developed] : well developed [Normal Sclera/Conjunctiva] : normal sclera/conjunctiva [EOMI] : extra ocular movement intact [Normal Outer Ear/Nose] : the ears and nose were normal in appearance [Normal Oropharynx] : the oropharynx was normal [No Respiratory Distress] : no respiratory distress [Clear to Auscultation] : lungs were clear to auscultation bilaterally [No Accessory Muscle Use] : no accessory muscle use [Normal Rate] : heart rate was normal  [Normal S1, S2] : normal S1 and S2 [Regular Rhythm] : with a regular rhythm [No Murmurs] : no murmurs heard [No Edema] : there was no peripheral edema [Normal Bowel Sounds] : normal bowel sounds [Non Tender] : non-tender [Soft] : abdomen soft [Not Distended] : not distended [Normal Post Cervical Nodes] : no posterior cervical lymphadenopathy [No CVA Tenderness] : no ~M costovertebral angle tenderness [Normal Anterior Cervical Nodes] : no anterior cervical lymphadenopathy [No Spinal Tenderness] : no spinal tenderness [Normal Gait] : normal gait [Normal Strength/Tone] : muscle strength and tone were normal [No Rash] : no rash [Oriented x3] : oriented to person, place, and time [Normal Affect] : the affect was normal

## 2023-03-14 NOTE — LETTER HEADER
[Care Plan reviewed and provided to patient/caregiver] : Care plan reviewed and provided to patient/caregiver no

## 2023-03-15 ENCOUNTER — APPOINTMENT (OUTPATIENT)
Dept: HOME HEALTH SERVICES | Facility: HOME HEALTH | Age: 73
End: 2023-03-15
Payer: MEDICARE

## 2023-03-15 VITALS
DIASTOLIC BLOOD PRESSURE: 70 MMHG | RESPIRATION RATE: 18 BRPM | RESPIRATION RATE: 18 BRPM | OXYGEN SATURATION: 97 % | OXYGEN SATURATION: 97 % | SYSTOLIC BLOOD PRESSURE: 130 MMHG | TEMPERATURE: 97 F | TEMPERATURE: 97 F | HEART RATE: 70 BPM | HEART RATE: 70 BPM | DIASTOLIC BLOOD PRESSURE: 70 MMHG | SYSTOLIC BLOOD PRESSURE: 130 MMHG

## 2023-03-15 DIAGNOSIS — J30.9 ALLERGIC RHINITIS, UNSPECIFIED: ICD-10-CM

## 2023-03-15 DIAGNOSIS — E78.2 MIXED HYPERLIPIDEMIA: ICD-10-CM

## 2023-03-15 DIAGNOSIS — I10 ESSENTIAL (PRIMARY) HYPERTENSION: ICD-10-CM

## 2023-03-15 DIAGNOSIS — E87.6 HYPOKALEMIA: ICD-10-CM

## 2023-03-15 PROCEDURE — 99349 HOME/RES VST EST MOD MDM 40: CPT

## 2023-03-15 RX ORDER — POTASSIUM CHLORIDE 1.5 G/1.58G
20 POWDER, FOR SOLUTION ORAL
Qty: 4 | Refills: 0 | Status: DISCONTINUED | COMMUNITY
Start: 2021-05-28 | End: 2023-03-15

## 2023-03-15 RX ORDER — CEPHALEXIN 500 MG/1
500 CAPSULE ORAL EVERY 6 HOURS
Qty: 120 | Refills: 3 | Status: DISCONTINUED | COMMUNITY
Start: 2021-09-03 | End: 2023-03-15

## 2023-03-15 NOTE — COUNSELING
[Normal Weight - ( BMI  <25 )] : normal weight - ( BMI  <25 ) [Non - Smoker] : non-smoker [Use assistive device to avoid falls] : use assistive device to avoid falls [___] : [unfilled] [] : foot exam [Patient not on disease-modifying anti-rheumatic drug due to overall prognosis] : Patient not on disease-modifying anti-rheumatic drug due to overall prognosis [Not Recommended] : Aspirin use not recommended due to overall prognosis [Decrease hospital use] : decrease hospital use [Patient/Caregiver not ready to engage in discussion] : patient/caregiver not ready to engage in discussion [Full Code] : Code Status: Full Code [No Limitations] : Treatment Guidelines: No limitations [Long Term Intubation] : Intubation: Long term intubation [FreeTextEntry3] : diabetic diet [FreeTextEntry4] : Educated patient/legal representative about CCM services and consent.\par Educated patient/legal representative that this service is available because they have 2 or more chronic conditions, that only one Provider can furnish CCM Services per calendar month and that CCM services are subject to the usual Medicare deductible and coinsurance. \par Patient/legal representative understands the right to stop the CCM services at any time by notifying House Calls office.\par Patient/legal representative has verbally consented 12/2022\par \par

## 2023-03-15 NOTE — HISTORY OF PRESENT ILLNESS
[Family Member] : family member [Formal Caregiver] : formal caregiver [Education provided] : education provided [LastPCPVisitDate] : 1/12/23 [FreeTextEntry1] : HTN, DM type 2, HLD, COPD, back surgery x 5 was admitted 4/17-4/21 with OM R hallux due to Staph aureus and Staph lugdunensis. [FreeTextEntry2] : 72 year old female with HTN, DM type 2, HLD, COPD, back surgery x 5 was admitted 4/17-4/21 with OM R hallux due to Staph aureus and Staph lugdunensis.  Seen for follow up.  Reported right hip and leg pain.  Chronic issues being seen by pain management and physicial therpay.  Given steroid injection in the hip.  Encouraged follow up.  Also explained nasal congestion will trial Flonase.  No cardiovascular or respiratory distress.  \par \par Patient denies fever, cough, trouble breathing, rash, vomiting and diarrhea. Patient has not been in close contact with someone covid positive.\par \par N95 mask, gloves, eye wear and gown used during visit: [Y]. Total face to face time with patient is 60 min.\par \par Patient/ patient's caregiver reports no weight loss >10lbs in the past 6 months. No changes in dentition or swallowing reported, No changes in hearing or vision reported. No changes in Cognition reported. Patient denies any symptoms of depression or anxiety. Patient is ADL and IADL dependent. No changes in gait or falls reported. \par Patient's home environment is safe.\par \par

## 2023-03-15 NOTE — CHRONIC CARE ASSESSMENT
[PPS Score: ____] : Palliative Performance Scale (PPS) Score: [unfilled] [FAST Score: ____] : Functional Assessment Scale (FAST) Score: [unfilled] [Patient Non-adherent to care plan] : patient non-adherent to care plan [de-identified] : as tolerated [de-identified] : diabetic diet

## 2023-03-15 NOTE — REASON FOR VISIT
[Initial Evaluation] : an initial evaluation [Family Member] : family member [Formal Caregiver] : formal caregiver [Pre-Visit Preparation] : pre-visit preparation was done [Intercurrent Specialty/Sub-specialty Visits] : the patient has intercurrent specialty/sub-specialty visits [FreeTextEntry1] : HTN, DM type 2, HLD, COPD, back surgery x 5 was admitted 4/17-4/21 with OM R hallux due to Staph aureus and Staph lugdunensis. [FreeTextEntry2] : MALISSA [FreeTextEntry3] : ID

## 2023-05-10 ENCOUNTER — APPOINTMENT (OUTPATIENT)
Dept: HOME HEALTH SERVICES | Facility: HOME HEALTH | Age: 73
End: 2023-05-10

## 2023-05-10 VITALS
TEMPERATURE: 97.6 F | OXYGEN SATURATION: 98 % | RESPIRATION RATE: 17 BRPM | SYSTOLIC BLOOD PRESSURE: 130 MMHG | DIASTOLIC BLOOD PRESSURE: 80 MMHG | HEART RATE: 75 BPM

## 2023-05-10 RX ORDER — BUDESONIDE AND FORMOTEROL FUMARATE DIHYDRATE 160; 4.5 UG/1; UG/1
160-4.5 AEROSOL RESPIRATORY (INHALATION) TWICE DAILY
Qty: 1 | Refills: 4 | Status: ACTIVE | COMMUNITY

## 2023-05-10 RX ORDER — VALSARTAN 80 MG/1
80 TABLET, COATED ORAL DAILY
Qty: 90 | Refills: 3 | Status: ACTIVE | COMMUNITY

## 2023-05-10 RX ORDER — ALBUTEROL SULFATE 90 UG/1
108 (90 BASE) INHALANT RESPIRATORY (INHALATION)
Qty: 54 | Refills: 3 | Status: ACTIVE | COMMUNITY
Start: 2022-06-13

## 2023-05-10 RX ORDER — INSULIN GLARGINE 100 [IU]/ML
100 INJECTION, SOLUTION SUBCUTANEOUS AT BEDTIME
Qty: 1 | Refills: 5 | Status: ACTIVE | COMMUNITY

## 2023-05-10 RX ORDER — SITAGLIPTIN 50 MG/1
50 TABLET, FILM COATED ORAL DAILY
Qty: 90 | Refills: 3 | Status: DISCONTINUED | COMMUNITY
Start: 2022-10-07 | End: 2023-05-10

## 2023-05-10 RX ORDER — FLUTICASONE FUROATE 27.5 UG/1
27.5 SPRAY, METERED NASAL DAILY
Qty: 1 | Refills: 3 | Status: ACTIVE | COMMUNITY
Start: 2023-03-15

## 2023-12-13 ENCOUNTER — APPOINTMENT (OUTPATIENT)
Dept: HOME HEALTH SERVICES | Facility: HOME HEALTH | Age: 73
End: 2023-12-13
Payer: MEDICARE

## 2023-12-13 VITALS
TEMPERATURE: 98 F | RESPIRATION RATE: 18 BRPM | HEART RATE: 68 BPM | OXYGEN SATURATION: 96 % | DIASTOLIC BLOOD PRESSURE: 68 MMHG | SYSTOLIC BLOOD PRESSURE: 126 MMHG | HEIGHT: 60 IN

## 2023-12-13 DIAGNOSIS — M54.50 LOW BACK PAIN, UNSPECIFIED: ICD-10-CM

## 2023-12-13 DIAGNOSIS — M81.0 AGE-RELATED OSTEOPOROSIS W/OUT CURRENT PATHOLOGICAL FRACTURE: ICD-10-CM

## 2023-12-13 DIAGNOSIS — J44.89 OTHER SPECIFIED CHRONIC OBSTRUCTIVE PULMONARY DISEASE: ICD-10-CM

## 2023-12-13 DIAGNOSIS — Z86.69 PERSONAL HISTORY OF OTHER DISEASES OF THE NERVOUS SYSTEM AND SENSE ORGANS: ICD-10-CM

## 2023-12-13 DIAGNOSIS — Z87.898 PERSONAL HISTORY OF OTHER SPECIFIED CONDITIONS: ICD-10-CM

## 2023-12-13 DIAGNOSIS — H92.20 OTORRHAGIA, UNSPECIFIED EAR: ICD-10-CM

## 2023-12-13 DIAGNOSIS — M62.40 CONTRACTURE OF MUSCLE, UNSPECIFIED SITE: ICD-10-CM

## 2023-12-13 DIAGNOSIS — R42 DIZZINESS AND GIDDINESS: ICD-10-CM

## 2023-12-13 DIAGNOSIS — G89.29 LOW BACK PAIN, UNSPECIFIED: ICD-10-CM

## 2023-12-13 DIAGNOSIS — Z92.89 PERSONAL HISTORY OF OTHER MEDICAL TREATMENT: ICD-10-CM

## 2023-12-13 PROCEDURE — 99349 HOME/RES VST EST MOD MDM 40: CPT

## 2023-12-13 RX ORDER — UMECLIDINIUM 62.5 UG/1
62.5 AEROSOL, POWDER ORAL DAILY
Qty: 1 | Refills: 1 | Status: ACTIVE | COMMUNITY
Start: 2023-12-13 | End: 1900-01-01

## 2023-12-13 RX ORDER — ALENDRONATE SODIUM 70 MG/1
70 TABLET ORAL
Qty: 4 | Refills: 2 | Status: ACTIVE | COMMUNITY
Start: 2023-12-13 | End: 1900-01-01

## 2023-12-13 RX ORDER — MECLIZINE HYDROCHLORIDE 25 MG/1
25 TABLET ORAL EVERY 6 HOURS
Qty: 120 | Refills: 3 | Status: ACTIVE | COMMUNITY
Start: 2023-12-13

## 2023-12-13 RX ORDER — TIZANIDINE 2 MG/1
2 TABLET ORAL TWICE DAILY
Qty: 60 | Refills: 3 | Status: ACTIVE | COMMUNITY
Start: 2023-12-13

## 2023-12-13 RX ORDER — CLOPIDOGREL BISULFATE 75 MG/1
75 TABLET, FILM COATED ORAL DAILY
Qty: 30 | Refills: 3 | Status: ACTIVE | COMMUNITY
Start: 2023-12-13

## 2023-12-13 NOTE — COUNSELING
[Overweight - ( BMI 25.1 - 29.9 )] : overweight -  ( BMI 25.1 - 29.9 ) [DASH diet recommended] : DASH diet recommended [Sodium restriction 2gm recommended] : sodium restriction 2 gm recommended [Non - Smoker] : non-smoker [Use grab bars] : use grab bars [Use assistive device to avoid falls] : use assistive device to avoid falls [Remove clutter and unsafe carpeting to avoid falls] : remove clutter and unsafe carpeting to avoid falls [] : foot exam [Patient not on disease-modifying anti-rheumatic drug due to overall prognosis] : Patient not on disease-modifying anti-rheumatic drug due to overall prognosis [Not Recommended] : Aspirin use not recommended due to overall prognosis [FreeTextEntry3] : diabetic diet [Improve pain control] : improve pain control [Minimize unnecessary interventions] : minimize unnecessary interventions [Discussed disease trajectory with patient/caregiver] : discussed disease trajectory with patient/caregiver [Patient/Caregiver is unclear of wishes] : patient/caregiver is unclear of wishes [Advanced Directives discussed: ____] : Advanced directives discussed: [unfilled] [Annual Discussion and review of: ___] : Annual discussion and review of [unfilled] [DNR] : Code Status: DNR [Limited] : Treatment Guidelines: Limited [DNI] : Intubation: DNI

## 2023-12-13 NOTE — CHRONIC CARE ASSESSMENT
[PPS Score: ____] : Palliative Performance Scale (PPS) Score: [unfilled] [3 or More Times Per Week] : exercises 3 or more times per week [Walking] : walking [Diabetic Diet] : diabetic [Low Fat Diet] : low fat [Low Carb Diet] : low carbohydrate [Low Salt Diet] : low salt [de-identified] : HTN, DM type 2,  COPD, back surgery x 5,  Osteoporosis, vertigo, CAD, involuntary muscle contraction  Osteoporosis, vertigo, CAD, involuntary muscle contraction [General Adherence] : and is generally adherent [de-identified] : as tolerated [de-identified] : diabetic diet, low fat, low sodium, low carb

## 2023-12-13 NOTE — REVIEW OF SYSTEMS
[Joint Pain] : joint pain [Joint Stiffness] : no joint stiffness [Joint Swelling] : no joint swelling [Muscle Weakness] : no muscle weakness [Muscle Pain] : no muscle pain [Back Pain] : back pain [Negative] : Heme/Lymph [FreeTextEntry9] : chronic back pain

## 2023-12-13 NOTE — REASON FOR VISIT
[Follow-Up] : a follow-up visit [[employer] ] : provided by [employer]   [Time Spent: ____ minutes] : Total time spent using  services: [unfilled] minutes. The patient's primary language is not English thus required  services. [Pre-Visit Preparation] : pre-visit preparation was done [FreeTextEntry1] : HTN, DM type 2, HLD, COPD, back surgery x 5 was admitted with OM R hallux due to Staph aureus and Staph lugdunensis.           Osteoporosis, vertigo, CAD, involuntary muscle contraction [Intercurrent Specialty/Sub-specialty Visits] : the patient has intercurrent specialty/sub-specialty visits [Interpreters_IDNumber] : [id number] [Interpreters_FullName] : Faye Wilkins [FreeTextEntry2] : chart reviewed [FreeTextEntry3] : Cardiology, Pulmonology, pain management [TWNoteComboBox1] : Malaysian

## 2023-12-13 NOTE — HISTORY OF PRESENT ILLNESS
[[employer]s Co-Management Patient] : [unfilled] is a House Calls co-management patient [Education provided] : education provided [Patient is stable] : patient is stable [LastPVisitDate] : 10/23 [FreeTextEntry4] : Cardiology, Pulmonology, pain management [Patient] : patient [FreeTextEntry1] : Not home bound [FreeTextEntry2] : 12/13/2023 COVID SCREEN: Patient or caretaker denies fever, cough, trouble breathing, rash, vomiting. Patient has not been in close contact with anyone who is COVID-19 positive, or suspected of having COVID-19.  N95 mask, gloves, eye wear and gown (if indicated) used during visit: Yes.  Total face to face time with patient is 45 min.  CHERELLE COPELAND is an 73 year with  HTN, DM type 2, HLD, COPD, back surgery x 5 was admitted with OM R hallux due to Staph aureus and Staph lugdunensis, Osteoporosis, vertigo, CAD, involuntary muscle contraction seen today for follow-up home visit  Patient, reports her significant was sick in bedroom during the visit but did not see him.   Patient's last hospitalization was over a year.  Since hospitalization patient/ patient's caregiver reports back to [place]. Complained of back pain 3/10 and taking ibuprofen with good effect. Patient denies chest pain, palpitation, distress, dizziness, headache and n/v.   Patient/ patient's caregiver reports no weight loss >10 lbs in the past 6 months. No changes in dentition or swallowing reported, No changes in hearing or vision reported. No changes in Cognition reported. Patient denies any symptoms of depression or anxiety. Patient is ADL independent/dependent and IADL independent/dependent. No changes in gait or falls reported.  Patient's home environment is safe.   Goals of care discussed 10mins. MOLST not completed, DNR/DNI, triad tube feeding/IV fluid/IV antibiotic. Hospitalized

## 2023-12-13 NOTE — HEALTH RISK ASSESSMENT
[HRA Reviewed] : Health risk assessment reviewed [FreeTextEntry8] : use cane as needed [Independent] : managing medications [Some assistance needed] : managing finances [No falls in past year] : Patient reported no falls in the past year [Yes] : The patient does have visual impairment [TimeGetUpGo] : 4 [de-identified] : walks independently at home

## 2023-12-13 NOTE — PHYSICAL EXAM
[No Acute Distress] : no acute distress [Normal Voice/Communication] : normal voice communication [Normal Sclera/Conjunctiva] : normal sclera/conjunctiva [PERRL] : pupils equal, round and reactive to light [EOMI] : extra ocular movement intact [Normal Oropharynx] : the oropharynx was normal [Normal Outer Ear/Nose] : the ears and nose were normal in appearance [Normal TMs] : both tympanic membranes were normal [No JVD] : no jugular venous distention [Supple] : the neck was supple [No LAD] : no lymphadenopathy [No Respiratory Distress] : no respiratory distress [Clear to Auscultation] : lungs were clear to auscultation bilaterally [No Accessory Muscle Use] : no accessory muscle use [Normal Rate] : heart rate was normal  [Regular Rhythm] : with a regular rhythm [No Murmurs] : no murmurs heard [Normal S1, S2] : normal S1 and S2 [No Edema] : there was no peripheral edema [Breast Exam Declined] : patient declined to have breast exam done [Normal Bowel Sounds] : normal bowel sounds [Non Tender] : non-tender [Soft] : abdomen soft [Not Distended] : not distended [Patient Refused] : rectal exam was refused by the patient [Normal Post Cervical Nodes] : no posterior cervical lymphadenopathy [Normal Anterior Cervical Nodes] : no anterior cervical lymphadenopathy [Kyphosis] : no kyphosis present [No CVA Tenderness] : no ~M costovertebral angle tenderness [Scoliosis] : scoliosis not present [Normal Gait] : normal gait [No Rash] : no rash [No Skin Lesions] : no skin lesions [Cranial Nerves Intact] : cranial nerves 2-12 were intact [Normal Reflexes] : deep tendon reflexes were 2+ and symmetric [Oriented x3] : oriented to person, place, and time [Over the Past 2 Weeks, Have You Felt Down, Depressed, or Hopeless?] : 1.) Over the past 2 weeks, have you felt down, depressed, or hopeless? No [Over the Past 2 Weeks, Have You Felt Little Interest or Pleasure Doing Things?] : 2.) Over the past 2 weeks, have you felt little interest or pleasure doing things? No [Foot Ulcers] : no foot ulcers [de-identified] : patient refused

## 2024-02-21 ENCOUNTER — APPOINTMENT (OUTPATIENT)
Dept: HOME HEALTH SERVICES | Facility: HOME HEALTH | Age: 74
End: 2024-02-21
Payer: MEDICARE

## 2024-02-21 VITALS
OXYGEN SATURATION: 99 % | SYSTOLIC BLOOD PRESSURE: 128 MMHG | TEMPERATURE: 97.8 F | HEART RATE: 77 BPM | RESPIRATION RATE: 20 BRPM | DIASTOLIC BLOOD PRESSURE: 72 MMHG | HEIGHT: 60 IN

## 2024-02-21 DIAGNOSIS — Z23 ENCOUNTER FOR IMMUNIZATION: ICD-10-CM

## 2024-02-21 DIAGNOSIS — M25.561 PAIN IN RIGHT KNEE: ICD-10-CM

## 2024-02-21 DIAGNOSIS — I25.118 ATHEROSCLEROTIC HEART DISEASE OF NATIVE CORONARY ARTERY WITH OTHER FORMS OF ANGINA PECTORIS: ICD-10-CM

## 2024-02-21 DIAGNOSIS — Z71.89 OTHER SPECIFIED COUNSELING: ICD-10-CM

## 2024-02-21 DIAGNOSIS — E11.9 TYPE 2 DIABETES MELLITUS W/OUT COMPLICATIONS: ICD-10-CM

## 2024-02-21 DIAGNOSIS — M86.9 OSTEOMYELITIS, UNSPECIFIED: ICD-10-CM

## 2024-02-21 DIAGNOSIS — Z95.5 PRESENCE OF CORONARY ANGIOPLASTY IMPLANT AND GRAFT: ICD-10-CM

## 2024-02-21 DIAGNOSIS — S98.139A COMPLETE TRAUMATIC AMPUTATION OF ONE UNSPECIFIED LESSER TOE, INITIAL ENCOUNTER: ICD-10-CM

## 2024-02-21 PROCEDURE — 99349 HOME/RES VST EST MOD MDM 40: CPT

## 2024-02-21 RX ORDER — NABUMETONE 500 MG/1
500 TABLET, FILM COATED ORAL
Qty: 60 | Refills: 0 | Status: ACTIVE | COMMUNITY
Start: 2024-02-21

## 2024-02-21 RX ORDER — LIDOCAINE 40 MG/G
4 PATCH TOPICAL
Qty: 15 | Refills: 0 | Status: ACTIVE | COMMUNITY
Start: 2024-02-21 | End: 1900-01-01

## 2024-02-21 RX ORDER — ROSUVASTATIN CALCIUM 10 MG/1
10 TABLET, FILM COATED ORAL
Qty: 90 | Refills: 3 | Status: ACTIVE | COMMUNITY
Start: 2023-12-13

## 2024-02-21 RX ORDER — IBUPROFEN 600 MG/1
600 TABLET, FILM COATED ORAL 3 TIMES DAILY
Qty: 90 | Refills: 0 | Status: DISCONTINUED | COMMUNITY
Start: 2023-12-13 | End: 2024-02-21

## 2024-02-21 RX ORDER — ATORVASTATIN CALCIUM 40 MG/1
40 TABLET, FILM COATED ORAL
Qty: 90 | Refills: 1 | Status: DISCONTINUED | COMMUNITY
End: 2024-02-21

## 2024-06-11 ENCOUNTER — NON-APPOINTMENT (OUTPATIENT)
Age: 74
End: 2024-06-11

## 2024-08-23 ENCOUNTER — NON-APPOINTMENT (OUTPATIENT)
Age: 74
End: 2024-08-23

## 2024-12-13 NOTE — ED ADULT NURSE NOTE - [facility]
PROVIDER:[TOKEN:[[id number]],FOLLOWUP:[2 weeks]]
Asthma    Diabetes    High cholesterol    Hypertension

## 2025-05-05 ENCOUNTER — APPOINTMENT (OUTPATIENT)
Dept: ORTHOPEDIC SURGERY | Facility: CLINIC | Age: 75
End: 2025-05-05

## 2025-05-06 NOTE — ED ADULT NURSE NOTE - [facility], MLM
blunted  Thought processes:  goal directed   Association:  Thought content:  Suicidal Ideation:  passive  Cognition:  oriented to person, place, and time   Attention & Concentration poor  Memory intact  Insight poor   Judgement poor   Fund of Knowledge adequate    Mini Mental Status       DIAGNOSIS:  Schizoaffective disorder bipolar    RISK ASSESSMENT:    SUICIDE RISK ASSESSMENT:high  HOMICIDE: low  AGITATION/VIOLENCE: low  ELOPEMENT: low    LABS: REVIEWED TODAY:  Recent Labs     05/05/25  1408   WBC 9.5   HGB 13.9        Recent Labs     05/05/25  1408      K 4.0      CO2 23   BUN 13   CREATININE 1.12*   GLUCOSE 153*     Recent Labs     05/05/25  1408   BILITOT 0.5   ALKPHOS 71   AST 13   ALT 12     Lab Results   Component Value Date/Time    BARBSCNU Neg 05/05/2025 02:14 PM    LABBENZ Neg 05/05/2025 02:14 PM    LABMETH Neg 05/05/2025 02:14 PM    ETOH <10 05/05/2025 02:08 PM     Lab Results   Component Value Date/Time    TSH 1.020 05/05/2025 02:08 PM     No results found for: \"LITHIUM\"  No results found for: \"VALPROATE\", \"CBMZ\"  No results found for: \"LITHIUM\", \"VALPROATE\"    FURTHER LABS ORDERED :      Radiology   No results found.    EKG: TRACING REVIEWED    TREATMENT PLAN:    Risk Management:      This patient was assessed for Medical bed necessity for the following reason:  N/A    Collateral Information:  Will obtain collateral information from the family or friends.  Will obtain medical records as appropriate from out patient providers  Will consult the hospitalist for a physical exam to rule out any co-morbid physical condition.    Home medication Reconciled       New Medications started during this admission :    See orders  Prn Haldol 5mg and Vistaril 50mg q6hr for extreme agitation.  Trazodone as ordered for insomnia  Vistaril as ordered for anxiety  Discussed with the patient risk, benefit, alternative and common side effects for the  proposed medication treatment. Patient is consenting 
Patent
